# Patient Record
Sex: FEMALE | Race: BLACK OR AFRICAN AMERICAN | NOT HISPANIC OR LATINO | Employment: OTHER | RURAL
[De-identification: names, ages, dates, MRNs, and addresses within clinical notes are randomized per-mention and may not be internally consistent; named-entity substitution may affect disease eponyms.]

---

## 2017-11-01 ENCOUNTER — HISTORICAL (OUTPATIENT)
Dept: ADMINISTRATIVE | Facility: HOSPITAL | Age: 67
End: 2017-11-01

## 2017-11-02 LAB
LAB AP CLINICAL INFORMATION: NORMAL
LAB AP DIAGNOSIS - HISTORICAL: NORMAL
LAB AP GROSS PATHOLOGY - HISTORICAL: NORMAL
LAB AP SPECIMEN SUBMITTED - HISTORICAL: NORMAL

## 2019-05-30 ENCOUNTER — HISTORICAL (OUTPATIENT)
Dept: ADMINISTRATIVE | Facility: HOSPITAL | Age: 69
End: 2019-05-30

## 2019-07-17 ENCOUNTER — HISTORICAL (OUTPATIENT)
Dept: ADMINISTRATIVE | Facility: HOSPITAL | Age: 69
End: 2019-07-17

## 2020-11-17 ENCOUNTER — HISTORICAL (OUTPATIENT)
Dept: ADMINISTRATIVE | Facility: HOSPITAL | Age: 70
End: 2020-11-17

## 2021-04-08 RX ORDER — LEVOTHYROXINE SODIUM 75 UG/1
75 TABLET ORAL DAILY
Qty: 90 TABLET | Refills: 3 | Status: SHIPPED | OUTPATIENT
Start: 2021-04-08 | End: 2022-04-14 | Stop reason: SDUPTHER

## 2021-04-08 RX ORDER — ALLOPURINOL 100 MG/1
TABLET ORAL
COMMUNITY
Start: 2021-03-25 | End: 2021-08-12 | Stop reason: SDUPTHER

## 2021-04-08 RX ORDER — POTASSIUM CHLORIDE 750 MG/1
TABLET, EXTENDED RELEASE ORAL
COMMUNITY
Start: 2021-01-17 | End: 2021-08-12 | Stop reason: SDUPTHER

## 2021-04-08 RX ORDER — LOSARTAN POTASSIUM 100 MG/1
100 TABLET ORAL DAILY
Qty: 90 TABLET | Refills: 3 | Status: SHIPPED | OUTPATIENT
Start: 2021-04-08 | End: 2022-04-14 | Stop reason: SDUPTHER

## 2021-04-08 RX ORDER — HYDROCHLOROTHIAZIDE 25 MG/1
25 TABLET ORAL DAILY
Qty: 90 TABLET | Refills: 3 | Status: SHIPPED | OUTPATIENT
Start: 2021-04-08 | End: 2021-07-30 | Stop reason: SDUPTHER

## 2021-04-08 RX ORDER — MONTELUKAST SODIUM 10 MG/1
TABLET ORAL
COMMUNITY
Start: 2021-01-17 | End: 2021-08-12 | Stop reason: SDUPTHER

## 2021-04-08 RX ORDER — AMLODIPINE BESYLATE 5 MG/1
5 TABLET ORAL DAILY
COMMUNITY
Start: 2021-03-25 | End: 2021-07-30 | Stop reason: SDUPTHER

## 2021-07-30 RX ORDER — ALBUTEROL SULFATE 90 UG/1
2 AEROSOL, METERED RESPIRATORY (INHALATION) EVERY 4 HOURS PRN
Qty: 25.5 G | Refills: 3 | Status: SHIPPED | OUTPATIENT
Start: 2021-07-30 | End: 2022-10-13 | Stop reason: SDUPTHER

## 2021-07-30 RX ORDER — ALBUTEROL SULFATE 90 UG/1
2 AEROSOL, METERED RESPIRATORY (INHALATION) EVERY 4 HOURS PRN
COMMUNITY
End: 2021-07-30 | Stop reason: SDUPTHER

## 2021-07-30 RX ORDER — AMLODIPINE BESYLATE 5 MG/1
5 TABLET ORAL DAILY
Qty: 90 TABLET | Refills: 3 | Status: SHIPPED | OUTPATIENT
Start: 2021-07-30 | End: 2022-07-11 | Stop reason: SDUPTHER

## 2021-07-30 RX ORDER — HYDROCHLOROTHIAZIDE 25 MG/1
25 TABLET ORAL DAILY
Qty: 90 TABLET | Refills: 3 | Status: SHIPPED | OUTPATIENT
Start: 2021-07-30 | End: 2022-07-11 | Stop reason: SDUPTHER

## 2021-08-12 ENCOUNTER — APPOINTMENT (OUTPATIENT)
Dept: RADIOLOGY | Facility: CLINIC | Age: 71
End: 2021-08-12
Attending: FAMILY MEDICINE
Payer: MEDICARE

## 2021-08-12 ENCOUNTER — OFFICE VISIT (OUTPATIENT)
Dept: FAMILY MEDICINE | Facility: CLINIC | Age: 71
End: 2021-08-12
Payer: MEDICARE

## 2021-08-12 VITALS
RESPIRATION RATE: 20 BRPM | DIASTOLIC BLOOD PRESSURE: 90 MMHG | SYSTOLIC BLOOD PRESSURE: 150 MMHG | WEIGHT: 250 LBS | TEMPERATURE: 98 F | HEIGHT: 64 IN | BODY MASS INDEX: 42.68 KG/M2 | HEART RATE: 84 BPM | OXYGEN SATURATION: 98 %

## 2021-08-12 DIAGNOSIS — E87.6 HYPOKALEMIA: ICD-10-CM

## 2021-08-12 DIAGNOSIS — M25.569 ACUTE KNEE PAIN, UNSPECIFIED LATERALITY: Primary | ICD-10-CM

## 2021-08-12 DIAGNOSIS — G89.29 CHRONIC PAIN OF LEFT KNEE: ICD-10-CM

## 2021-08-12 DIAGNOSIS — M25.569 ACUTE KNEE PAIN, UNSPECIFIED LATERALITY: ICD-10-CM

## 2021-08-12 DIAGNOSIS — M25.562 CHRONIC PAIN OF LEFT KNEE: ICD-10-CM

## 2021-08-12 DIAGNOSIS — M10.9 GOUT, UNSPECIFIED CAUSE, UNSPECIFIED CHRONICITY, UNSPECIFIED SITE: ICD-10-CM

## 2021-08-12 DIAGNOSIS — G47.09 OTHER INSOMNIA: ICD-10-CM

## 2021-08-12 DIAGNOSIS — J30.9 ALLERGIC RHINITIS, UNSPECIFIED SEASONALITY, UNSPECIFIED TRIGGER: ICD-10-CM

## 2021-08-12 PROCEDURE — 73562 X-RAY EXAM OF KNEE 3: CPT | Mod: 26,LT,, | Performed by: RADIOLOGY

## 2021-08-12 PROCEDURE — 99214 PR OFFICE/OUTPT VISIT, EST, LEVL IV, 30-39 MIN: ICD-10-PCS | Mod: ,,, | Performed by: FAMILY MEDICINE

## 2021-08-12 PROCEDURE — 99214 OFFICE O/P EST MOD 30 MIN: CPT | Mod: ,,, | Performed by: FAMILY MEDICINE

## 2021-08-12 PROCEDURE — 73562 XR KNEE 3 VIEW LEFT: ICD-10-PCS | Mod: 26,LT,, | Performed by: RADIOLOGY

## 2021-08-12 PROCEDURE — 73562 X-RAY EXAM OF KNEE 3: CPT | Mod: TC,RHCUB,LT | Performed by: FAMILY MEDICINE

## 2021-08-12 RX ORDER — ZOLPIDEM TARTRATE 10 MG/1
10 TABLET ORAL NIGHTLY PRN
Qty: 90 TABLET | Refills: 1 | Status: SHIPPED | OUTPATIENT
Start: 2021-08-12 | End: 2023-10-06 | Stop reason: SDUPTHER

## 2021-08-12 RX ORDER — MONTELUKAST SODIUM 10 MG/1
10 TABLET ORAL DAILY
Qty: 90 TABLET | Refills: 3 | Status: SHIPPED | OUTPATIENT
Start: 2021-08-12 | End: 2022-10-13 | Stop reason: SDUPTHER

## 2021-08-12 RX ORDER — POTASSIUM CHLORIDE 750 MG/1
20 TABLET, EXTENDED RELEASE ORAL ONCE
Qty: 180 TABLET | Refills: 3 | Status: SHIPPED | OUTPATIENT
Start: 2021-08-12 | End: 2021-08-12

## 2021-08-12 RX ORDER — ALLOPURINOL 100 MG/1
200 TABLET ORAL DAILY
Qty: 180 TABLET | Refills: 3 | Status: SHIPPED | OUTPATIENT
Start: 2021-08-12 | End: 2022-10-13 | Stop reason: SDUPTHER

## 2021-10-01 ENCOUNTER — OFFICE VISIT (OUTPATIENT)
Dept: ORTHOPEDICS | Facility: CLINIC | Age: 71
End: 2021-10-01
Payer: MEDICARE

## 2021-10-01 VITALS — BODY MASS INDEX: 42.68 KG/M2 | HEIGHT: 64 IN | WEIGHT: 250 LBS

## 2021-10-01 DIAGNOSIS — G89.29 CHRONIC PAIN OF LEFT KNEE: ICD-10-CM

## 2021-10-01 DIAGNOSIS — M17.12 OSTEOARTHRITIS OF LEFT KNEE, UNSPECIFIED OSTEOARTHRITIS TYPE: Primary | ICD-10-CM

## 2021-10-01 DIAGNOSIS — M25.562 CHRONIC PAIN OF LEFT KNEE: ICD-10-CM

## 2021-10-01 PROCEDURE — 20610 LARGE JOINT ASPIRATION/INJECTION: L SUPRA PATELLAR BURSA: ICD-10-PCS | Mod: LT,,, | Performed by: NURSE PRACTITIONER

## 2021-10-01 PROCEDURE — 99214 OFFICE O/P EST MOD 30 MIN: CPT | Mod: 25,,, | Performed by: NURSE PRACTITIONER

## 2021-10-01 PROCEDURE — 99214 PR OFFICE/OUTPT VISIT, EST, LEVL IV, 30-39 MIN: ICD-10-PCS | Mod: 25,,, | Performed by: NURSE PRACTITIONER

## 2021-10-01 PROCEDURE — 20610 DRAIN/INJ JOINT/BURSA W/O US: CPT | Mod: LT,,, | Performed by: NURSE PRACTITIONER

## 2021-10-01 RX ORDER — NAPROXEN 500 MG/1
500 TABLET ORAL 2 TIMES DAILY WITH MEALS
Qty: 60 TABLET | Refills: 3 | Status: SHIPPED | OUTPATIENT
Start: 2021-10-01 | End: 2022-04-14 | Stop reason: SDUPTHER

## 2021-10-01 RX ORDER — TRIAMCINOLONE ACETONIDE 40 MG/ML
40 INJECTION, SUSPENSION INTRA-ARTICULAR; INTRAMUSCULAR
Status: DISCONTINUED | OUTPATIENT
Start: 2021-10-01 | End: 2021-10-01 | Stop reason: HOSPADM

## 2021-10-01 RX ADMIN — TRIAMCINOLONE ACETONIDE 40 MG: 40 INJECTION, SUSPENSION INTRA-ARTICULAR; INTRAMUSCULAR at 09:10

## 2021-10-14 ENCOUNTER — OFFICE VISIT (OUTPATIENT)
Dept: FAMILY MEDICINE | Facility: CLINIC | Age: 71
End: 2021-10-14
Payer: MEDICARE

## 2021-10-14 VITALS
TEMPERATURE: 97 F | DIASTOLIC BLOOD PRESSURE: 84 MMHG | SYSTOLIC BLOOD PRESSURE: 138 MMHG | HEART RATE: 60 BPM | BODY MASS INDEX: 40.82 KG/M2 | HEIGHT: 65 IN | RESPIRATION RATE: 18 BRPM | WEIGHT: 245 LBS | OXYGEN SATURATION: 99 %

## 2021-10-14 DIAGNOSIS — E87.6 HYPOKALEMIA: ICD-10-CM

## 2021-10-14 DIAGNOSIS — E03.9 HYPOTHYROIDISM, UNSPECIFIED TYPE: ICD-10-CM

## 2021-10-14 DIAGNOSIS — Z23 NEED FOR VACCINATION: ICD-10-CM

## 2021-10-14 DIAGNOSIS — I10 ESSENTIAL HYPERTENSION: Primary | ICD-10-CM

## 2021-10-14 DIAGNOSIS — D64.9 ANEMIA, UNSPECIFIED TYPE: ICD-10-CM

## 2021-10-14 DIAGNOSIS — E55.9 VITAMIN D DEFICIENCY: ICD-10-CM

## 2021-10-14 LAB
25(OH)D3 SERPL-MCNC: 10.9 NG/ML
ALBUMIN SERPL BCP-MCNC: 3.6 G/DL (ref 3.5–5)
ALBUMIN/GLOB SERPL: 0.8 {RATIO}
ALP SERPL-CCNC: 100 U/L (ref 55–142)
ALT SERPL W P-5'-P-CCNC: 14 U/L (ref 13–56)
ANION GAP SERPL CALCULATED.3IONS-SCNC: 8 MMOL/L (ref 7–16)
AST SERPL W P-5'-P-CCNC: 9 U/L (ref 15–37)
BACTERIA #/AREA URNS HPF: NORMAL /HPF
BASOPHILS # BLD AUTO: 0.06 K/UL (ref 0–0.2)
BASOPHILS NFR BLD AUTO: 0.6 % (ref 0–1)
BILIRUB SERPL-MCNC: 0.4 MG/DL (ref 0–1.2)
BILIRUB UR QL STRIP: NEGATIVE
BUN SERPL-MCNC: 22 MG/DL (ref 7–18)
BUN/CREAT SERPL: 21 (ref 6–20)
CALCIUM SERPL-MCNC: 9.5 MG/DL (ref 8.5–10.1)
CHLORIDE SERPL-SCNC: 108 MMOL/L (ref 98–107)
CHOLEST SERPL-MCNC: 140 MG/DL (ref 0–200)
CHOLEST/HDLC SERPL: 2.3 {RATIO}
CLARITY UR: CLEAR
CO2 SERPL-SCNC: 29 MMOL/L (ref 21–32)
COLOR UR: YELLOW
CREAT SERPL-MCNC: 1.04 MG/DL (ref 0.55–1.02)
DIFFERENTIAL METHOD BLD: ABNORMAL
EOSINOPHIL # BLD AUTO: 0.22 K/UL (ref 0–0.5)
EOSINOPHIL NFR BLD AUTO: 2.1 % (ref 1–4)
ERYTHROCYTE [DISTWIDTH] IN BLOOD BY AUTOMATED COUNT: 16.9 % (ref 11.5–14.5)
GLOBULIN SER-MCNC: 4.3 G/DL (ref 2–4)
GLUCOSE SERPL-MCNC: 106 MG/DL (ref 74–106)
GLUCOSE UR STRIP-MCNC: NEGATIVE MG/DL
HCT VFR BLD AUTO: 37.6 % (ref 38–47)
HDLC SERPL-MCNC: 61 MG/DL (ref 40–60)
HGB BLD-MCNC: 11.7 G/DL (ref 12–16)
IMM GRANULOCYTES # BLD AUTO: 0.04 K/UL (ref 0–0.04)
IMM GRANULOCYTES NFR BLD: 0.4 % (ref 0–0.4)
KETONES UR STRIP-SCNC: NEGATIVE MG/DL
LDLC SERPL CALC-MCNC: 71 MG/DL
LDLC/HDLC SERPL: 1.2 {RATIO}
LEUKOCYTE ESTERASE UR QL STRIP: ABNORMAL
LYMPHOCYTES # BLD AUTO: 2.51 K/UL (ref 1–4.8)
LYMPHOCYTES NFR BLD AUTO: 23.5 % (ref 27–41)
MCH RBC QN AUTO: 28.3 PG (ref 27–31)
MCHC RBC AUTO-ENTMCNC: 31.1 G/DL (ref 32–36)
MCV RBC AUTO: 90.8 FL (ref 80–96)
MONOCYTES # BLD AUTO: 0.79 K/UL (ref 0–0.8)
MONOCYTES NFR BLD AUTO: 7.4 % (ref 2–6)
MPC BLD CALC-MCNC: 9.8 FL (ref 9.4–12.4)
NEUTROPHILS # BLD AUTO: 7.08 K/UL (ref 1.8–7.7)
NEUTROPHILS NFR BLD AUTO: 66 % (ref 53–65)
NITRITE UR QL STRIP: NEGATIVE
NONHDLC SERPL-MCNC: 79 MG/DL
NRBC # BLD AUTO: 0 X10E3/UL
NRBC, AUTO (.00): 0 %
PH UR STRIP: 6.5 PH UNITS
PLATELET # BLD AUTO: 521 K/UL (ref 150–400)
POTASSIUM SERPL-SCNC: 4.8 MMOL/L (ref 3.5–5.1)
PROT SERPL-MCNC: 7.9 G/DL (ref 6.4–8.2)
PROT UR QL STRIP: NEGATIVE
RBC # BLD AUTO: 4.14 M/UL (ref 4.2–5.4)
RBC # UR STRIP: NEGATIVE /UL
RBC #/AREA URNS HPF: NORMAL /HPF
SODIUM SERPL-SCNC: 140 MMOL/L (ref 136–145)
SP GR UR STRIP: 1.02
SQUAMOUS #/AREA URNS LPF: NORMAL /LPF
TRIGL SERPL-MCNC: 39 MG/DL (ref 35–150)
TSH SERPL DL<=0.005 MIU/L-ACNC: 2.4 UIU/ML (ref 0.36–3.74)
UROBILINOGEN UR STRIP-ACNC: 1 MG/DL
VLDLC SERPL-MCNC: 8 MG/DL
WBC # BLD AUTO: 10.7 K/UL (ref 4.5–11)
WBC #/AREA URNS HPF: NORMAL /HPF

## 2021-10-14 PROCEDURE — 90662 FLU VACCINE - QUADRIVALENT - HIGH DOSE (65+) PRESERVATIVE FREE IM: ICD-10-PCS | Mod: ,,, | Performed by: FAMILY MEDICINE

## 2021-10-14 PROCEDURE — 84443 ASSAY THYROID STIM HORMONE: CPT | Mod: ,,, | Performed by: CLINICAL MEDICAL LABORATORY

## 2021-10-14 PROCEDURE — 82306 VITAMIN D 25 HYDROXY: CPT | Mod: ,,, | Performed by: CLINICAL MEDICAL LABORATORY

## 2021-10-14 PROCEDURE — 82306 VITAMIN D: ICD-10-PCS | Mod: ,,, | Performed by: CLINICAL MEDICAL LABORATORY

## 2021-10-14 PROCEDURE — 80061 LIPID PANEL: ICD-10-PCS | Mod: ,,, | Performed by: CLINICAL MEDICAL LABORATORY

## 2021-10-14 PROCEDURE — G0008 FLU VACCINE - QUADRIVALENT - HIGH DOSE (65+) PRESERVATIVE FREE IM: ICD-10-PCS | Mod: ,,, | Performed by: FAMILY MEDICINE

## 2021-10-14 PROCEDURE — 80053 COMPREHENSIVE METABOLIC PANEL: ICD-10-PCS | Mod: ,,, | Performed by: CLINICAL MEDICAL LABORATORY

## 2021-10-14 PROCEDURE — 85025 CBC WITH DIFFERENTIAL: ICD-10-PCS | Mod: ,,, | Performed by: CLINICAL MEDICAL LABORATORY

## 2021-10-14 PROCEDURE — 90662 IIV NO PRSV INCREASED AG IM: CPT | Mod: ,,, | Performed by: FAMILY MEDICINE

## 2021-10-14 PROCEDURE — 81001 URINALYSIS, REFLEX TO URINE CULTURE: ICD-10-PCS | Mod: ,,, | Performed by: CLINICAL MEDICAL LABORATORY

## 2021-10-14 PROCEDURE — G0008 ADMIN INFLUENZA VIRUS VAC: HCPCS | Mod: ,,, | Performed by: FAMILY MEDICINE

## 2021-10-14 PROCEDURE — 85025 COMPLETE CBC W/AUTO DIFF WBC: CPT | Mod: ,,, | Performed by: CLINICAL MEDICAL LABORATORY

## 2021-10-14 PROCEDURE — 99214 OFFICE O/P EST MOD 30 MIN: CPT | Mod: ,,, | Performed by: FAMILY MEDICINE

## 2021-10-14 PROCEDURE — 80061 LIPID PANEL: CPT | Mod: ,,, | Performed by: CLINICAL MEDICAL LABORATORY

## 2021-10-14 PROCEDURE — 99214 PR OFFICE/OUTPT VISIT, EST, LEVL IV, 30-39 MIN: ICD-10-PCS | Mod: ,,, | Performed by: FAMILY MEDICINE

## 2021-10-14 PROCEDURE — 81001 URINALYSIS AUTO W/SCOPE: CPT | Mod: ,,, | Performed by: CLINICAL MEDICAL LABORATORY

## 2021-10-14 PROCEDURE — 84443 TSH: ICD-10-PCS | Mod: ,,, | Performed by: CLINICAL MEDICAL LABORATORY

## 2021-10-14 PROCEDURE — 80053 COMPREHEN METABOLIC PANEL: CPT | Mod: ,,, | Performed by: CLINICAL MEDICAL LABORATORY

## 2021-12-16 ENCOUNTER — APPOINTMENT (OUTPATIENT)
Dept: RADIOLOGY | Facility: CLINIC | Age: 71
End: 2021-12-16
Attending: FAMILY MEDICINE
Payer: MEDICARE

## 2021-12-16 DIAGNOSIS — Z11.1 SCREENING-PULMONARY TB: ICD-10-CM

## 2021-12-16 DIAGNOSIS — I10 ESSENTIAL HYPERTENSION: Primary | ICD-10-CM

## 2021-12-16 DIAGNOSIS — I10 ESSENTIAL HYPERTENSION: ICD-10-CM

## 2021-12-16 PROCEDURE — 71046 X-RAY EXAM CHEST 2 VIEWS: CPT | Mod: TC,RHCUB | Performed by: FAMILY MEDICINE

## 2021-12-16 PROCEDURE — 71046 X-RAY EXAM CHEST 2 VIEWS: CPT | Mod: 26,,, | Performed by: RADIOLOGY

## 2021-12-16 PROCEDURE — 71046 XR CHEST PA AND LATERAL: ICD-10-PCS | Mod: 26,,, | Performed by: RADIOLOGY

## 2022-02-17 ENCOUNTER — OFFICE VISIT (OUTPATIENT)
Dept: ORTHOPEDICS | Facility: CLINIC | Age: 72
End: 2022-02-17
Payer: MEDICARE

## 2022-02-17 DIAGNOSIS — M17.12 PRIMARY OSTEOARTHRITIS OF LEFT KNEE: Primary | ICD-10-CM

## 2022-02-17 PROCEDURE — 20610 LARGE JOINT ASPIRATION/INJECTION: L SUPRA PATELLAR BURSA: ICD-10-PCS | Mod: LT,,, | Performed by: ORTHOPAEDIC SURGERY

## 2022-02-17 PROCEDURE — 99212 OFFICE O/P EST SF 10 MIN: CPT | Mod: 25,,, | Performed by: ORTHOPAEDIC SURGERY

## 2022-02-17 PROCEDURE — 99212 PR OFFICE/OUTPT VISIT, EST, LEVL II, 10-19 MIN: ICD-10-PCS | Mod: 25,,, | Performed by: ORTHOPAEDIC SURGERY

## 2022-02-17 PROCEDURE — 20610 DRAIN/INJ JOINT/BURSA W/O US: CPT | Mod: LT,,, | Performed by: ORTHOPAEDIC SURGERY

## 2022-02-17 RX ORDER — BUPIVACAINE HYDROCHLORIDE 5 MG/ML
3 INJECTION, SOLUTION PERINEURAL
Status: DISCONTINUED | OUTPATIENT
Start: 2022-02-17 | End: 2022-02-17 | Stop reason: HOSPADM

## 2022-02-17 RX ORDER — TRIAMCINOLONE ACETONIDE 40 MG/ML
40 INJECTION, SUSPENSION INTRA-ARTICULAR; INTRAMUSCULAR
Status: DISCONTINUED | OUTPATIENT
Start: 2022-02-17 | End: 2022-02-17 | Stop reason: HOSPADM

## 2022-02-17 RX ADMIN — TRIAMCINOLONE ACETONIDE 40 MG: 40 INJECTION, SUSPENSION INTRA-ARTICULAR; INTRAMUSCULAR at 02:02

## 2022-02-17 RX ADMIN — BUPIVACAINE HYDROCHLORIDE 3 ML: 5 INJECTION, SOLUTION PERINEURAL at 02:02

## 2022-02-17 NOTE — PROGRESS NOTES
ASSESSMENT:      ICD-10-CM ICD-9-CM   1. Primary osteoarthritis of left knee  M17.12 715.16       PLAN:     -Findings and treatment options were discussed with the patient  -All questions answered  Natural history and expected course discussed. Questions answered.  Educational materials distributed.  The process of total knee arthroplasty was discussed today.  We are going to do 1 more injection as she is not ready for total knee arthroplasty at this time.  Injection given today please see procedure note.  Return to clinic in 3 months.  There are no Patient Instructions on file for this visit.    IMAGING:  No results found.             CC:  Knee pain    71 y.o. Female returns to clinic for a follow up visit regarding knee pain.      Treatment to date has included:  Injections with moderate relief         REVIEW OF SYSTEMS:   Constitution: Negative. Negative for chills, fever and night sweats.    Hematologic/Lymphatic: Negative for bleeding problem. Does not bruise/bleed easily.   Skin: Negative for dry skin, itching and rash.   Musculoskeletal: Negative for falls. Positive for knee pain and muscle weakness.     All other review of symptoms were reviewed and found to be noncontributory.     PAST MEDICAL HISTORY:   Past Medical History:   Diagnosis Date    Anemia     Dysuria     Encounter for long-term (current) use of other medications     Encounter for screening colonoscopy 05/13/2015    Essential hypertension     Family history of polyps in the colon     Goiter     Gout     H/O bone density study 02/27/2020    History of esophagogastroduodenoscopy (EGD) 01/2020    Hypokalemia     Hypothyroidism     Insomnia     Knee pain     Screening for malignant neoplasm of cervix 01/20/2020    Screening mammogram, encounter for 07/16/2021    [facility]/negative exam/scattered fibroglandular densities/recommended anual screening    Screening mammogram, encounter for 06/11/2020       PAST SURGICAL HISTORY:    Past Surgical History:   Procedure Laterality Date    HYSTERECTOMY      TUBAL LIGATION         FAMILY HISTORY:   No family history on file.    SOCIAL HISTORY:   Social History     Socioeconomic History    Marital status: Single   Occupational History    Occupation: retired   Tobacco Use    Smoking status: Never Smoker    Smokeless tobacco: Never Used   Substance and Sexual Activity    Alcohol use: Never    Drug use: Never    Sexual activity: Not Currently       MEDICATIONS:     Current Outpatient Medications:     albuterol (PROVENTIL/VENTOLIN HFA) 90 mcg/actuation inhaler, Inhale 2 puffs into the lungs every 4 (four) hours as needed for Wheezing. Rescue, Disp: 25.5 g, Rfl: 3    allopurinoL (ZYLOPRIM) 100 MG tablet, Take 2 tablets (200 mg total) by mouth once daily., Disp: 180 tablet, Rfl: 3    amLODIPine (NORVASC) 5 MG tablet, Take 1 tablet (5 mg total) by mouth once daily., Disp: 90 tablet, Rfl: 3    hydroCHLOROthiazide (HYDRODIURIL) 25 MG tablet, Take 1 tablet (25 mg total) by mouth once daily., Disp: 90 tablet, Rfl: 3    levothyroxine (EUTHYROX) 75 MCG tablet, Take 1 tablet (75 mcg total) by mouth once daily., Disp: 90 tablet, Rfl: 3    losartan (COZAAR) 100 MG tablet, Take 1 tablet (100 mg total) by mouth once daily., Disp: 90 tablet, Rfl: 3    montelukast (SINGULAIR) 10 mg tablet, Take 1 tablet (10 mg total) by mouth once daily., Disp: 90 tablet, Rfl: 3    naproxen (NAPROSYN) 500 MG tablet, Take 1 tablet (500 mg total) by mouth 2 (two) times daily with meals., Disp: 60 tablet, Rfl: 3    zolpidem (AMBIEN) 10 mg Tab, Take 1 tablet (10 mg total) by mouth nightly as needed (insomnia)., Disp: 90 tablet, Rfl: 1    ALLERGIES:   Review of patient's allergies indicates:  No Known Allergies     PHYSICAL EXAMINATION:  There were no vitals taken for this visit.  Ortho/SPM Exam        Orders Placed This Encounter   Procedures    Large Joint Aspiration/Injection: L supra patellar bursa         Large  Joint Aspiration/Injection: L supra patellar bursa    Date/Time: 2/17/2022 2:45 PM  Performed by: Erasmo Rogers MD  Authorized by: Erasmo Rogers MD     Consent Done?:  Yes (Verbal)  Indications:  Pain  Local anesthesia used?: No      Details:  Needle Size:  22 G  Approach:  Superior  Location:  Knee  Site:  L supra patellar bursa  Medications:  3 mL BUPivacaine 0.5 % (5 mg/mL); 40 mg triamcinolone acetonide 40 mg/mL  Patient tolerance:  Patient tolerated the procedure well with no immediate complications

## 2022-03-11 DIAGNOSIS — Z71.89 COMPLEX CARE COORDINATION: ICD-10-CM

## 2022-04-14 ENCOUNTER — OFFICE VISIT (OUTPATIENT)
Dept: FAMILY MEDICINE | Facility: CLINIC | Age: 72
End: 2022-04-14
Payer: MEDICARE

## 2022-04-14 VITALS
OXYGEN SATURATION: 98 % | BODY MASS INDEX: 42.82 KG/M2 | TEMPERATURE: 99 F | DIASTOLIC BLOOD PRESSURE: 70 MMHG | WEIGHT: 257 LBS | RESPIRATION RATE: 18 BRPM | HEART RATE: 81 BPM | SYSTOLIC BLOOD PRESSURE: 112 MMHG | HEIGHT: 65 IN

## 2022-04-14 DIAGNOSIS — M10.9 GOUT, UNSPECIFIED CAUSE, UNSPECIFIED CHRONICITY, UNSPECIFIED SITE: ICD-10-CM

## 2022-04-14 DIAGNOSIS — E55.9 VITAMIN D DEFICIENCY: ICD-10-CM

## 2022-04-14 DIAGNOSIS — M17.12 OSTEOARTHRITIS OF LEFT KNEE, UNSPECIFIED OSTEOARTHRITIS TYPE: Chronic | ICD-10-CM

## 2022-04-14 DIAGNOSIS — D64.9 ANEMIA, UNSPECIFIED TYPE: ICD-10-CM

## 2022-04-14 DIAGNOSIS — E03.9 HYPOTHYROIDISM, UNSPECIFIED TYPE: Chronic | ICD-10-CM

## 2022-04-14 DIAGNOSIS — I10 ESSENTIAL HYPERTENSION: Primary | ICD-10-CM

## 2022-04-14 DIAGNOSIS — R73.9 HYPERGLYCEMIA: ICD-10-CM

## 2022-04-14 PROCEDURE — 99214 PR OFFICE/OUTPT VISIT, EST, LEVL IV, 30-39 MIN: ICD-10-PCS | Mod: ,,, | Performed by: FAMILY MEDICINE

## 2022-04-14 PROCEDURE — 99214 OFFICE O/P EST MOD 30 MIN: CPT | Mod: ,,, | Performed by: FAMILY MEDICINE

## 2022-04-14 RX ORDER — POTASSIUM CHLORIDE 750 MG/1
TABLET, EXTENDED RELEASE ORAL
COMMUNITY
Start: 2021-12-14 | End: 2022-07-15 | Stop reason: SDUPTHER

## 2022-04-14 RX ORDER — NAPROXEN 500 MG/1
500 TABLET ORAL 2 TIMES DAILY PRN
Qty: 180 TABLET | Refills: 3 | Status: SHIPPED | OUTPATIENT
Start: 2022-04-14 | End: 2023-04-13 | Stop reason: SDUPTHER

## 2022-04-14 RX ORDER — LEVOTHYROXINE SODIUM 75 UG/1
75 TABLET ORAL DAILY
Qty: 90 TABLET | Refills: 3 | Status: SHIPPED | OUTPATIENT
Start: 2022-04-14 | End: 2022-07-11 | Stop reason: SDUPTHER

## 2022-04-14 RX ORDER — LOSARTAN POTASSIUM 100 MG/1
100 TABLET ORAL DAILY
Qty: 90 TABLET | Refills: 3 | Status: SHIPPED | OUTPATIENT
Start: 2022-04-14 | End: 2022-07-11 | Stop reason: SDUPTHER

## 2022-04-14 NOTE — PROGRESS NOTES
"Subjective:       Patient ID: Janay Reveles is a 71 y.o. female.    Chief Complaint: 6 monthcheck up    70 yo BF here for 6 month check up and lab.     Review of Systems   Constitutional: Negative for activity change.   Eyes: Negative for visual disturbance.   Respiratory: Negative for shortness of breath.    Cardiovascular: Negative for chest pain.   Gastrointestinal: Negative for abdominal pain.   Neurological: Negative for headaches.   Psychiatric/Behavioral: Negative for dysphoric mood.             Objective:     Blood pressure 112/70, pulse 81, temperature 98.5 °F (36.9 °C), temperature source Oral, resp. rate 18, height 5' 5" (1.651 m), weight 116.6 kg (257 lb), SpO2 98 %.      Physical Exam  Constitutional:       Appearance: Normal appearance.   HENT:      Head: Normocephalic and atraumatic.      Right Ear: External ear normal.      Left Ear: External ear normal.      Nose: Nose normal.      Mouth/Throat:      Mouth: Mucous membranes are moist.   Eyes:      Conjunctiva/sclera: Conjunctivae normal.      Pupils: Pupils are equal, round, and reactive to light.   Cardiovascular:      Rate and Rhythm: Normal rate and regular rhythm.      Pulses: Normal pulses.      Heart sounds: Normal heart sounds.   Pulmonary:      Effort: Pulmonary effort is normal.      Breath sounds: Normal breath sounds.   Abdominal:      General: Abdomen is flat.      Palpations: Abdomen is soft.   Musculoskeletal:         General: Normal range of motion.      Cervical back: Normal range of motion and neck supple.   Skin:     General: Skin is warm and dry.   Neurological:      General: No focal deficit present.      Mental Status: She is alert and oriented to person, place, and time.   Psychiatric:         Mood and Affect: Mood normal.         Behavior: Behavior normal.         Thought Content: Thought content normal.         Judgment: Judgment normal.         Assessment:       Problem List Items Addressed This Visit        " Cardiac/Vascular    Essential hypertension - Primary    Relevant Medications    losartan (COZAAR) 100 MG tablet    Other Relevant Orders    Urinalysis, Reflex to Urine Culture    Lipid Panel    CBC Auto Differential    Comprehensive Metabolic Panel    TSH       Oncology    Anemia       Endocrine    Hypothyroidism (Chronic)    Relevant Medications    levothyroxine (EUTHYROX) 75 MCG tablet    Other Relevant Orders    Urinalysis, Reflex to Urine Culture    Lipid Panel    CBC Auto Differential    Comprehensive Metabolic Panel    TSH    Vitamin D deficiency    Relevant Orders    Vitamin D       Orthopedic    Osteoarthritis of left knee (Chronic)    Relevant Medications    naproxen (NAPROSYN) 500 MG tablet      Other Visit Diagnoses     Gout, unspecified cause, unspecified chronicity, unspecified site        Relevant Orders    Uric Acid    Hyperglycemia        Relevant Orders    Hemoglobin A1C          Plan:     RTC 6 months for check up with all lab fasting and urine.

## 2022-04-19 PROBLEM — M17.12 OSTEOARTHRITIS OF LEFT KNEE: Chronic | Status: ACTIVE | Noted: 2022-04-19

## 2022-05-17 ENCOUNTER — OFFICE VISIT (OUTPATIENT)
Dept: ORTHOPEDICS | Facility: CLINIC | Age: 72
End: 2022-05-17
Payer: MEDICARE

## 2022-05-17 DIAGNOSIS — M17.12 PRIMARY OSTEOARTHRITIS OF LEFT KNEE: Primary | ICD-10-CM

## 2022-05-17 PROCEDURE — 99215 PR OFFICE/OUTPT VISIT, EST, LEVL V, 40-54 MIN: ICD-10-PCS | Mod: ,,, | Performed by: ORTHOPAEDIC SURGERY

## 2022-05-17 PROCEDURE — 99215 OFFICE O/P EST HI 40 MIN: CPT | Mod: ,,, | Performed by: ORTHOPAEDIC SURGERY

## 2022-05-17 NOTE — PROGRESS NOTES
CC:  Knee pain    71 y.o. Female returns to clinic for a follow up visit regarding knee pain.      She had an injection at her last visit in February and got about 2 months relief from the injection. She is ready to discuss total knee replacement.       Past Medical History:   Diagnosis Date    Anemia     Dysuria     Encounter for long-term (current) use of other medications     Encounter for screening colonoscopy 05/13/2015    Essential hypertension     Family history of polyps in the colon     Goiter     Gout     H/O bone density study 02/27/2020    History of esophagogastroduodenoscopy (EGD) 01/2020    Hypokalemia     Hypothyroidism     Insomnia     Knee pain     Screening for malignant neoplasm of cervix 01/20/2020    Screening mammogram, encounter for 07/16/2021    [facility]/negative exam/scattered fibroglandular densities/recommended anual screening    Screening mammogram, encounter for 06/11/2020     Past Surgical History:   Procedure Laterality Date    HYSTERECTOMY      TUBAL LIGATION           REVIEW OF SYSTEMS:   Constitution: Negative. Negative for chills, fever and night sweats.    Hematologic/Lymphatic: Negative for bleeding problem. Does not bruise/bleed easily.   Skin: Negative for dry skin, itching and rash.   Musculoskeletal: Negative for falls. Positive for knee pain and muscle weakness.   All other review of symptoms were reviewed and found to be noncontributory.     PHYSICAL EXAMINATION:  There were no vitals taken for this visit.  General    Nursing note and vitals reviewed.  Constitutional: She is oriented to person, place, and time. She appears well-developed and well-nourished.   HENT:   Head: Normocephalic and atraumatic.   Nose: Nose normal.   Eyes: Pupils are equal, round, and reactive to light.   Neck: Neck supple.   Cardiovascular: Normal rate, regular rhythm and intact distal pulses.    Pulmonary/Chest: Effort normal. No respiratory distress. She exhibits no  tenderness.   Abdominal: Soft. She exhibits no distension. There is no abdominal tenderness.   Neurological: She is alert and oriented to person, place, and time. She has normal reflexes. She displays normal reflexes. No cranial nerve deficit. She exhibits normal muscle tone.   Psychiatric: She has a normal mood and affect. Her behavior is normal. Judgment and thought content normal.     General Musculoskeletal Exam   Gait: antalgic       Right Knee Exam   Right knee exam is normal.    Inspection   Swelling: present  Effusion: present  Deformity: present    Tenderness   The patient is tender to palpation of the medial joint line.    Crepitus   The patient has crepitus of the patella and medial joint line.    Range of Motion   Extension: abnormal   Flexion: abnormal     Tests   Meniscus   Ronald:  Medial - positive   Ligament Examination Lachman: normal (-1 to 2mm) PCL-Posterior Drawer: normal (0 to 2mm)     MCL - Valgus: normal (0 to 2mm)  LCL - Varus: normalPivot Shift: normal (Equal)Reverse Pivot Shift: normal (Equal)Dial Test at 30 degrees: normal (< 5 degrees)Dial Test at 90 degrees: normal (< 5 degrees)  Posterior Sag Test: negative  Posterolateral Corner: unstable (>15 degrees difference)  Patella   Patellar Tracking: normal  Q-Angle at 90 degrees: normal  Patellar Grind: positive    Other   Sensation: normal    Left Knee Exam     Inspection   Deformity: absent    Tenderness   The patient tender to palpation of the medial joint line and lateral joint line.    Crepitus   The patient has crepitus of the patella.    Range of Motion   Extension: normal   Flexion: abnormal     Tests   Meniscus   Ronald:  Medial - positive Lateral - positive  Stability Lachman: normal (-1 to 2mm) PCL-Posterior Drawer: normal (0 to 2mm)  MCL - Valgus: normal (0 to 2mm)  LCL - Varus: normal (0 to 2mm)  Posterior Sag Test: negative  Patella   Passive Patellar Tilt: lateral tilt  Patellar Tracking: normal  Patellar Grind:  positive  J-Sign: J sign absent    Other   Muscle Tightness: hamstring tightness  Sensation: normal    Muscle Strength   Right Lower Extremity   Quadriceps:  5/5   Hamstrin/5   Left Lower Extremity   Hip Abduction: 5/5   Quadriceps:  5/5   Hamstrin/5     Reflexes     Left Side  Quadriceps:  2+    Right Side   Quadriceps:  2+    Vascular Exam     Right Pulses  Dorsalis Pedis:      2+  Posterior Tibial:      2+          IMAGING:  No results found.     ASSESSMENT:      ICD-10-CM ICD-9-CM   1. Primary osteoarthritis of left knee  M17.12 715.16       PLAN:     -Findings and treatment options were discussed with the patient  -All questions answered  Natural history and expected course discussed. Questions answered.  Educational materials distributed.  Rest, ice, compression, and elevation (RICE) therapy.  The conservative options including NSAIDs, activity modification, physical therapy, corticosteroid injection, and viscosupplimentation were discussed. She is interested in surgical intervention at this time.    The surgical process of knee replacement was discussed in detail with the patient including a detailed discussion of the procedure itself (including visual model, x-ray review, and literature review). The typical perioperative and post-operative course was discussed and perioperative risks were discussed to the patient's satisfaction.  Risks and complications discussed included but were not limited to the risks of anesthetic complications, infection, bleeding, wound healing complications, aseptic loosening, instability, limb length inequality, neurologic dysfunction including numbness,  DVT, pulmonary embolism, perioperative medical risks (cardiac, pulmonary, renal, neurologic), and death and the patient elects to proceed. We will initiate pre-operative medical evaluation and clearance and set a provisional date for surgical intervention according to the patient's schedule.   I have discussed  anticoagulation with aspirin and coumadin and in low risk patients I have recommended aspirin twice a day.  25 minutes was spent in direct consultation with the patient counselling her on the items listed above.      There are no Patient Instructions on file for this visit.      No orders of the defined types were placed in this encounter.        Procedures

## 2022-05-19 DIAGNOSIS — M17.12 OSTEOARTHRITIS OF LEFT KNEE: ICD-10-CM

## 2022-05-19 DIAGNOSIS — M17.12 PRIMARY OSTEOARTHRITIS OF LEFT KNEE: Primary | ICD-10-CM

## 2022-05-19 DIAGNOSIS — Z01.818 PREPROCEDURAL EXAMINATION: Primary | ICD-10-CM

## 2022-05-19 RX ORDER — SODIUM CHLORIDE 9 MG/ML
INJECTION, SOLUTION INTRAVENOUS CONTINUOUS
Status: CANCELLED | OUTPATIENT
Start: 2022-05-19

## 2022-05-19 RX ORDER — MUPIROCIN 20 MG/G
OINTMENT TOPICAL
Status: CANCELLED | OUTPATIENT
Start: 2022-05-19

## 2022-05-31 ENCOUNTER — EXTERNAL CHRONIC CARE MANAGEMENT (OUTPATIENT)
Dept: FAMILY MEDICINE | Facility: CLINIC | Age: 72
End: 2022-05-31
Payer: MEDICARE

## 2022-05-31 PROCEDURE — G0511 PR CHRONIC CARE MGMT, RHC OR FQHC ONLY, 20 MINS OR MORE: ICD-10-PCS | Mod: ,,, | Performed by: FAMILY MEDICINE

## 2022-05-31 PROCEDURE — G0511 CCM/BHI BY RHC/FQHC 20MIN MO: HCPCS | Mod: ,,, | Performed by: FAMILY MEDICINE

## 2022-06-30 ENCOUNTER — EXTERNAL CHRONIC CARE MANAGEMENT (OUTPATIENT)
Dept: FAMILY MEDICINE | Facility: CLINIC | Age: 72
End: 2022-06-30
Payer: MEDICARE

## 2022-06-30 PROCEDURE — G0511 CCM/BHI BY RHC/FQHC 20MIN MO: HCPCS | Mod: ,,, | Performed by: FAMILY MEDICINE

## 2022-06-30 PROCEDURE — G0511 PR CHRONIC CARE MGMT, RHC OR FQHC ONLY, 20 MINS OR MORE: ICD-10-PCS | Mod: ,,, | Performed by: FAMILY MEDICINE

## 2022-07-08 DIAGNOSIS — M17.12 PRIMARY OSTEOARTHRITIS OF LEFT KNEE: Primary | ICD-10-CM

## 2022-07-11 ENCOUNTER — APPOINTMENT (OUTPATIENT)
Dept: RADIOLOGY | Facility: CLINIC | Age: 72
End: 2022-07-11
Attending: FAMILY MEDICINE
Payer: MEDICARE

## 2022-07-11 ENCOUNTER — OFFICE VISIT (OUTPATIENT)
Dept: FAMILY MEDICINE | Facility: CLINIC | Age: 72
End: 2022-07-11
Payer: MEDICARE

## 2022-07-11 VITALS
BODY MASS INDEX: 42.99 KG/M2 | HEART RATE: 67 BPM | SYSTOLIC BLOOD PRESSURE: 132 MMHG | OXYGEN SATURATION: 97 % | RESPIRATION RATE: 18 BRPM | DIASTOLIC BLOOD PRESSURE: 84 MMHG | HEIGHT: 65 IN | WEIGHT: 258 LBS | TEMPERATURE: 99 F

## 2022-07-11 DIAGNOSIS — Z01.818 PRE-OP TESTING: ICD-10-CM

## 2022-07-11 DIAGNOSIS — D64.9 ANEMIA, UNSPECIFIED TYPE: ICD-10-CM

## 2022-07-11 DIAGNOSIS — Z01.818 PREPROCEDURAL EXAMINATION: ICD-10-CM

## 2022-07-11 DIAGNOSIS — I10 ESSENTIAL HYPERTENSION: ICD-10-CM

## 2022-07-11 DIAGNOSIS — E03.9 HYPOTHYROIDISM, UNSPECIFIED TYPE: Chronic | ICD-10-CM

## 2022-07-11 DIAGNOSIS — E55.9 VITAMIN D DEFICIENCY: ICD-10-CM

## 2022-07-11 DIAGNOSIS — E87.6 HYPOKALEMIA: ICD-10-CM

## 2022-07-11 DIAGNOSIS — I10 ESSENTIAL HYPERTENSION: Primary | Chronic | ICD-10-CM

## 2022-07-11 LAB
ALBUMIN SERPL BCP-MCNC: 3.4 G/DL (ref 3.5–5)
ALBUMIN/GLOB SERPL: 0.8 {RATIO}
ALP SERPL-CCNC: 81 U/L (ref 55–142)
ALT SERPL W P-5'-P-CCNC: 14 U/L (ref 13–56)
ANION GAP SERPL CALCULATED.3IONS-SCNC: 9 MMOL/L (ref 7–16)
APTT PPP: 30.3 SECONDS (ref 25.2–37.3)
AST SERPL W P-5'-P-CCNC: 12 U/L (ref 15–37)
BASOPHILS # BLD AUTO: 0.06 K/UL (ref 0–0.2)
BASOPHILS NFR BLD AUTO: 0.6 % (ref 0–1)
BILIRUB SERPL-MCNC: 0.3 MG/DL (ref 0–1.2)
BUN SERPL-MCNC: 14 MG/DL (ref 7–18)
BUN/CREAT SERPL: 16 (ref 6–20)
CALCIUM SERPL-MCNC: 9.1 MG/DL (ref 8.5–10.1)
CHLORIDE SERPL-SCNC: 106 MMOL/L (ref 98–107)
CO2 SERPL-SCNC: 29 MMOL/L (ref 21–32)
CREAT SERPL-MCNC: 0.87 MG/DL (ref 0.55–1.02)
DIFFERENTIAL METHOD BLD: ABNORMAL
EOSINOPHIL # BLD AUTO: 0.27 K/UL (ref 0–0.5)
EOSINOPHIL NFR BLD AUTO: 2.6 % (ref 1–4)
ERYTHROCYTE [DISTWIDTH] IN BLOOD BY AUTOMATED COUNT: 16.4 % (ref 11.5–14.5)
GLOBULIN SER-MCNC: 4.2 G/DL (ref 2–4)
GLUCOSE SERPL-MCNC: 116 MG/DL (ref 74–106)
HCT VFR BLD AUTO: 36 % (ref 38–47)
HGB BLD-MCNC: 11.5 G/DL (ref 12–16)
IMM GRANULOCYTES # BLD AUTO: 0.03 K/UL (ref 0–0.04)
IMM GRANULOCYTES NFR BLD: 0.3 % (ref 0–0.4)
INDIRECT COOMBS: NORMAL
INR BLD: 0.94 (ref 0.9–1.1)
LYMPHOCYTES # BLD AUTO: 2.63 K/UL (ref 1–4.8)
LYMPHOCYTES NFR BLD AUTO: 25.1 % (ref 27–41)
MCH RBC QN AUTO: 27.6 PG (ref 27–31)
MCHC RBC AUTO-ENTMCNC: 31.9 G/DL (ref 32–36)
MCV RBC AUTO: 86.5 FL (ref 80–96)
MONOCYTES # BLD AUTO: 0.83 K/UL (ref 0–0.8)
MONOCYTES NFR BLD AUTO: 7.9 % (ref 2–6)
MPC BLD CALC-MCNC: 9.2 FL (ref 9.4–12.4)
NEUTROPHILS # BLD AUTO: 6.65 K/UL (ref 1.8–7.7)
NEUTROPHILS NFR BLD AUTO: 63.5 % (ref 53–65)
NRBC # BLD AUTO: 0 X10E3/UL
NRBC, AUTO (.00): 0 %
PLATELET # BLD AUTO: 572 K/UL (ref 150–400)
POTASSIUM SERPL-SCNC: 3.4 MMOL/L (ref 3.5–5.1)
PROT SERPL-MCNC: 7.6 G/DL (ref 6.4–8.2)
PROTHROMBIN TIME: 12.2 SECONDS (ref 11.7–14.7)
RBC # BLD AUTO: 4.16 M/UL (ref 4.2–5.4)
RH BLD: NORMAL
SODIUM SERPL-SCNC: 141 MMOL/L (ref 136–145)
WBC # BLD AUTO: 10.47 K/UL (ref 4.5–11)

## 2022-07-11 PROCEDURE — 93010 PR ELECTROCARDIOGRAM REPORT: ICD-10-PCS | Mod: ,,, | Performed by: FAMILY MEDICINE

## 2022-07-11 PROCEDURE — 71046 XR CHEST PA AND LATERAL: ICD-10-PCS | Mod: 26,,, | Performed by: RADIOLOGY

## 2022-07-11 PROCEDURE — 86850 TYPE & SCREEN: ICD-10-PCS | Mod: ,,, | Performed by: CLINICAL MEDICAL LABORATORY

## 2022-07-11 PROCEDURE — 86900 TYPE & SCREEN: ICD-10-PCS | Mod: ,,, | Performed by: CLINICAL MEDICAL LABORATORY

## 2022-07-11 PROCEDURE — 93005 ELECTROCARDIOGRAM TRACING: CPT | Mod: RHCUB | Performed by: FAMILY MEDICINE

## 2022-07-11 PROCEDURE — 86901 TYPE & SCREEN: ICD-10-PCS | Mod: ,,, | Performed by: CLINICAL MEDICAL LABORATORY

## 2022-07-11 PROCEDURE — 86900 BLOOD TYPING SEROLOGIC ABO: CPT | Mod: ,,, | Performed by: CLINICAL MEDICAL LABORATORY

## 2022-07-11 PROCEDURE — 93010 ELECTROCARDIOGRAM REPORT: CPT | Mod: ,,, | Performed by: FAMILY MEDICINE

## 2022-07-11 PROCEDURE — 99214 PR OFFICE/OUTPT VISIT, EST, LEVL IV, 30-39 MIN: ICD-10-PCS | Mod: ,,, | Performed by: FAMILY MEDICINE

## 2022-07-11 PROCEDURE — 71046 X-RAY EXAM CHEST 2 VIEWS: CPT | Mod: 26,,, | Performed by: RADIOLOGY

## 2022-07-11 PROCEDURE — 86901 BLOOD TYPING SEROLOGIC RH(D): CPT | Mod: ,,, | Performed by: CLINICAL MEDICAL LABORATORY

## 2022-07-11 PROCEDURE — 80053 COMPREHEN METABOLIC PANEL: CPT | Mod: ,,, | Performed by: CLINICAL MEDICAL LABORATORY

## 2022-07-11 PROCEDURE — 99214 OFFICE O/P EST MOD 30 MIN: CPT | Mod: ,,, | Performed by: FAMILY MEDICINE

## 2022-07-11 PROCEDURE — 80053 COMPREHENSIVE METABOLIC PANEL: ICD-10-PCS | Mod: ,,, | Performed by: CLINICAL MEDICAL LABORATORY

## 2022-07-11 PROCEDURE — 86850 RBC ANTIBODY SCREEN: CPT | Mod: ,,, | Performed by: CLINICAL MEDICAL LABORATORY

## 2022-07-11 PROCEDURE — 85025 CBC WITH DIFFERENTIAL: ICD-10-PCS | Mod: ,,, | Performed by: CLINICAL MEDICAL LABORATORY

## 2022-07-11 PROCEDURE — 85025 COMPLETE CBC W/AUTO DIFF WBC: CPT | Mod: ,,, | Performed by: CLINICAL MEDICAL LABORATORY

## 2022-07-11 PROCEDURE — 71046 X-RAY EXAM CHEST 2 VIEWS: CPT | Mod: TC,RHCUB | Performed by: FAMILY MEDICINE

## 2022-07-11 RX ORDER — HYDROCHLOROTHIAZIDE 25 MG/1
25 TABLET ORAL DAILY
Qty: 90 TABLET | Refills: 3 | Status: SHIPPED | OUTPATIENT
Start: 2022-07-11 | End: 2023-10-06

## 2022-07-11 RX ORDER — AMLODIPINE BESYLATE 5 MG/1
5 TABLET ORAL DAILY
Qty: 90 TABLET | Refills: 3 | Status: SHIPPED | OUTPATIENT
Start: 2022-07-11 | End: 2023-09-12

## 2022-07-11 RX ORDER — LEVOTHYROXINE SODIUM 75 UG/1
75 TABLET ORAL DAILY
Qty: 90 TABLET | Refills: 3 | Status: SHIPPED | OUTPATIENT
Start: 2022-07-11 | End: 2023-08-07

## 2022-07-11 RX ORDER — LOSARTAN POTASSIUM 100 MG/1
100 TABLET ORAL DAILY
Qty: 90 TABLET | Refills: 3 | Status: SHIPPED | OUTPATIENT
Start: 2022-07-11 | End: 2023-09-12

## 2022-07-11 NOTE — PROGRESS NOTES
Subjective:       Patient ID: Janay Reveles is a 71 y.o. female.    Chief Complaint: No chief complaint on file.    72 yo BF here for pre-op eval for left total knee replacement by Dr.Lane Rogers on 7/18/2022. She had her right knee done in 2010. Is doing well otherwise.     Review of Systems   Constitutional: Negative for activity change.   Eyes: Negative for visual disturbance.   Respiratory: Negative for shortness of breath.    Cardiovascular: Negative for chest pain.   Gastrointestinal: Negative for abdominal pain.   Neurological: Negative for headaches.   Psychiatric/Behavioral: Negative for dysphoric mood.         Office Visit on 07/11/2022   Component Date Value Ref Range Status    Group & Rh 07/11/2022 O POS   Final    Indirect Miguel 07/11/2022 NEG   Final    Sodium 07/11/2022 141  136 - 145 mmol/L Final    Potassium 07/11/2022 3.4 (A) 3.5 - 5.1 mmol/L Final    Chloride 07/11/2022 106  98 - 107 mmol/L Final    CO2 07/11/2022 29  21 - 32 mmol/L Final    Anion Gap 07/11/2022 9  7 - 16 mmol/L Final    Glucose 07/11/2022 116 (A) 74 - 106 mg/dL Final    BUN 07/11/2022 14  7 - 18 mg/dL Final    Creatinine 07/11/2022 0.87  0.55 - 1.02 mg/dL Final    BUN/Creatinine Ratio 07/11/2022 16  6 - 20 Final    Calcium 07/11/2022 9.1  8.5 - 10.1 mg/dL Final    Total Protein 07/11/2022 7.6  6.4 - 8.2 g/dL Final    Albumin 07/11/2022 3.4 (A) 3.5 - 5.0 g/dL Final    Globulin 07/11/2022 4.2 (A) 2.0 - 4.0 g/dL Final    A/G Ratio 07/11/2022 0.8   Final    Bilirubin, Total 07/11/2022 0.3  0.0 - 1.2 mg/dL Final    Alk Phos 07/11/2022 81  55 - 142 U/L Final    ALT 07/11/2022 14  13 - 56 U/L Final    AST 07/11/2022 12 (A) 15 - 37 U/L Final    eGFR 07/11/2022 68  >=60 mL/min/1.73m² Final    PTT 07/11/2022 30.3  25.2 - 37.3 seconds Final    PT 07/11/2022 12.2  11.7 - 14.7 seconds Final    INR 07/11/2022 0.94  0.90 - 1.10 Final    WBC 07/11/2022 10.47  4.50 - 11.00 K/uL Final    RBC 07/11/2022 4.16 (A) 4.20 -  "5.40 M/uL Final    Hemoglobin 07/11/2022 11.5 (A) 12.0 - 16.0 g/dL Final    Hematocrit 07/11/2022 36.0 (A) 38.0 - 47.0 % Final    MCV 07/11/2022 86.5  80.0 - 96.0 fL Final    MCH 07/11/2022 27.6  27.0 - 31.0 pg Final    MCHC 07/11/2022 31.9 (A) 32.0 - 36.0 g/dL Final    RDW 07/11/2022 16.4 (A) 11.5 - 14.5 % Final    Platelet Count 07/11/2022 572 (A) 150 - 400 K/uL Final    MPV 07/11/2022 9.2 (A) 9.4 - 12.4 fL Final    Neutrophils % 07/11/2022 63.5  53.0 - 65.0 % Final    Lymphocytes % 07/11/2022 25.1 (A) 27.0 - 41.0 % Final    Monocytes % 07/11/2022 7.9 (A) 2.0 - 6.0 % Final    Eosinophils % 07/11/2022 2.6  1.0 - 4.0 % Final    Basophils % 07/11/2022 0.6  0.0 - 1.0 % Final    Immature Granulocytes % 07/11/2022 0.3  0.0 - 0.4 % Final    nRBC, Auto 07/11/2022 0.0  <=0.0 % Final    Neutrophils, Abs 07/11/2022 6.65  1.80 - 7.70 K/uL Final    Lymphocytes, Absolute 07/11/2022 2.63  1.00 - 4.80 K/uL Final    Monocytes, Absolute 07/11/2022 0.83 (A) 0.00 - 0.80 K/uL Final    Eosinophils, Absolute 07/11/2022 0.27  0.00 - 0.50 K/uL Final    Basophils, Absolute 07/11/2022 0.06  0.00 - 0.20 K/uL Final    Immature Granulocytes, Absolute 07/11/2022 0.03  0.00 - 0.04 K/uL Final    nRBC, Absolute 07/11/2022 0.00  <=0.00 x10e3/uL Final    Diff Type 07/11/2022 Auto   Final        Objective:     Blood pressure 132/84, pulse 67, temperature 97.8 °F (36.6 °C), resp. rate 18, height 5' 5" (1.651 m), weight 117 kg (258 lb), SpO2 97 %.      Physical Exam  Constitutional:       Appearance: Normal appearance.   HENT:      Head: Normocephalic and atraumatic.      Right Ear: External ear normal.      Left Ear: External ear normal.      Nose: Nose normal.      Mouth/Throat:      Mouth: Mucous membranes are moist.   Eyes:      Conjunctiva/sclera: Conjunctivae normal.      Pupils: Pupils are equal, round, and reactive to light.   Cardiovascular:      Rate and Rhythm: Normal rate and regular rhythm.      Pulses: Normal " pulses.      Heart sounds: Normal heart sounds.   Pulmonary:      Effort: Pulmonary effort is normal.      Breath sounds: Normal breath sounds.   Abdominal:      General: Abdomen is flat.      Palpations: Abdomen is soft.   Musculoskeletal:         General: Normal range of motion.      Cervical back: Normal range of motion and neck supple.   Skin:     General: Skin is warm and dry.   Neurological:      General: No focal deficit present.      Mental Status: She is alert and oriented to person, place, and time.   Psychiatric:         Mood and Affect: Mood normal.         Behavior: Behavior normal.         Thought Content: Thought content normal.         Judgment: Judgment normal.       CXR is ok and EKG is WNL.   Assessment:       Problem List Items Addressed This Visit        Cardiac/Vascular    Essential hypertension - Primary    Relevant Medications    amLODIPine (NORVASC) 5 MG tablet    hydroCHLOROthiazide (HYDRODIURIL) 25 MG tablet    losartan (COZAAR) 100 MG tablet    Other Relevant Orders    X-Ray Chest PA And Lateral (Completed)    POCT EKG 12-LEAD (NOT FOR [facility] USE)       Oncology    Anemia       Endocrine    Hypothyroidism (Chronic)    Relevant Medications    levothyroxine (EUTHYROX) 75 MCG tablet    Vitamin D deficiency      Other Visit Diagnoses     Pre-op testing        Relevant Orders    X-Ray Chest PA And Lateral (Completed)    POCT EKG 12-LEAD (NOT FOR [facility] USE)    Preprocedural examination        Hypokalemia        Relevant Medications    potassium chloride (KLOR-CON) 10 MEQ TbSR          Plan:       She will increase her potassium intake to 2 tablets twice a day before surgery. Also will start MVI with iron daily. She is cleared for surgery from my standpoint.

## 2022-07-12 ENCOUNTER — OFFICE VISIT (OUTPATIENT)
Dept: ORTHOPEDICS | Facility: CLINIC | Age: 72
End: 2022-07-12
Payer: MEDICARE

## 2022-07-12 ENCOUNTER — HOSPITAL ENCOUNTER (OUTPATIENT)
Dept: RADIOLOGY | Facility: HOSPITAL | Age: 72
Discharge: HOME | End: 2022-07-12
Attending: ORTHOPAEDIC SURGERY
Payer: MEDICARE

## 2022-07-12 DIAGNOSIS — Z01.818 PREPROCEDURAL EXAMINATION: ICD-10-CM

## 2022-07-12 DIAGNOSIS — M17.12 PRIMARY OSTEOARTHRITIS OF LEFT KNEE: Primary | ICD-10-CM

## 2022-07-12 DIAGNOSIS — M17.12 PRIMARY OSTEOARTHRITIS OF LEFT KNEE: ICD-10-CM

## 2022-07-12 PROCEDURE — 73562 X-RAY EXAM OF KNEE 3: CPT | Mod: TC,LT

## 2022-07-12 PROCEDURE — 99214 OFFICE O/P EST MOD 30 MIN: CPT | Mod: ,,, | Performed by: ORTHOPAEDIC SURGERY

## 2022-07-12 PROCEDURE — 99214 PR OFFICE/OUTPT VISIT, EST, LEVL IV, 30-39 MIN: ICD-10-PCS | Mod: ,,, | Performed by: ORTHOPAEDIC SURGERY

## 2022-07-12 PROCEDURE — 73562 X-RAY EXAM OF KNEE 3: CPT | Mod: 26,LT,, | Performed by: ORTHOPAEDIC SURGERY

## 2022-07-12 PROCEDURE — 73562 XR KNEE 3 VIEW LEFT: ICD-10-PCS | Mod: 26,LT,, | Performed by: ORTHOPAEDIC SURGERY

## 2022-07-12 NOTE — H&P (VIEW-ONLY)
CC:  Knee pain    71 y.o. Female returns to clinic for a follow up visit regarding left knee pain.  She is scheduled for left total knee arthroplasty in the coming days.  She is here today to go over her laboratory studies and discuss any in all questions related surgery.            Past Medical History:   Diagnosis Date    Anemia     Dysuria     Encounter for long-term (current) use of other medications     Encounter for screening colonoscopy 05/13/2015    Essential hypertension     Family history of polyps in the colon     Goiter     Gout     H/O bone density study 02/27/2020    History of esophagogastroduodenoscopy (EGD) 01/2020    Hypokalemia     Hypothyroidism     Insomnia     Knee pain     Screening for malignant neoplasm of cervix 01/20/2020    Screening mammogram, encounter for 07/16/2021    Andersons/negative exam/scattered fibroglandular densities/recommended anual screening    Screening mammogram, encounter for 06/11/2020     Past Surgical History:   Procedure Laterality Date    HYSTERECTOMY      TUBAL LIGATION           REVIEW OF SYSTEMS:   Constitution: Negative. Negative for chills, fever and night sweats.    Hematologic/Lymphatic: Negative for bleeding problem. Does not bruise/bleed easily.   Skin: Negative for dry skin, itching and rash.   Musculoskeletal: Negative for falls. Positive for knee pain and muscle weakness.   All other review of symptoms were reviewed and found to be noncontributory.     PHYSICAL EXAMINATION:  LMP  (LMP Unknown) Comment: partial hysterectomy  General    Constitutional: She is oriented to person, place, and time. She appears well-developed and well-nourished.   HENT:   Head: Normocephalic and atraumatic.   Eyes: Pupils are equal, round, and reactive to light.   Neck: Neck supple.   Cardiovascular: Normal rate, regular rhythm and intact distal pulses.    Pulmonary/Chest: Effort normal. No respiratory distress. She exhibits no tenderness.    Abdominal: Soft. There is no abdominal tenderness.   Neurological: She is alert and oriented to person, place, and time. She has normal reflexes. She displays normal reflexes. No cranial nerve deficit. She exhibits normal muscle tone.   Psychiatric: She has a normal mood and affect. Her behavior is normal. Judgment and thought content normal.           Right Knee Exam   Right knee exam is normal.    Left Knee Exam     Inspection   Deformity: absent    Tenderness   The patient tender to palpation of the medial joint line and lateral joint line.    Crepitus   The patient has crepitus of the patella.    Range of Motion   Extension: normal   Flexion: abnormal     Tests   Meniscus   Ronald:  Medial - positive Lateral - positive  Stability Lachman: normal (-1 to 2mm) PCL-Posterior Drawer: normal (0 to 2mm)  MCL - Valgus: normal (0 to 2mm)  LCL - Varus: normal (0 to 2mm)  Posterior Sag Test: negative  Patella   Passive Patellar Tilt: lateral tilt  Patellar Tracking: normal  Patellar Grind: positive  J-Sign: J sign absent    Other   Muscle Tightness: hamstring tightness  Sensation: normal    Muscle Strength   Left Lower Extremity   Hip Abduction: 5/5   Quadriceps:  5/5   Hamstrin/5     Reflexes     Left Side  Quadriceps:  2+        IMAGING:  X-Ray Chest PA And Lateral    Result Date: 2022  EXAMINATION: XR CHEST PA AND LATERAL CLINICAL HISTORY: Essential (primary) hypertension COMPARISON: 2016, 10/01/2018, 10/22/2019, 2020, and 2021 FINDINGS: There is borderline cardiomegaly but no suggestion of failure or interval increase in the cardiac size since the previous exam.  There is ectasia of the thoracic aorta but an overall stable mediastinal configuration.  No peripheral infiltrates or pleural effusions are seen.  Degenerative changes are present in the mid and lower thoracic spine.     Chronic changes but no evidence of failure or focal infiltrate. Place of service: [facility]  Electronically signed by: Iris Plummerabdirashid Date:    07/11/2022 Time:    10:55    X-Ray Knee 3 View Left    Result Date: 7/12/2022  See Procedure Notes for results. IMPRESSION: Please see Ortho procedure notes for report.  This procedure was auto-finalized by: Virtual Radiologist   Severe left knee osteoarthritis most pronounced along the medial compartment with associated patellofemoral osteoarthritis.  There is evidence of bone necrosis within the intramedullary canal the proximal to mid tibia which appears to be stable compared to prior radiographs.    ASSESSMENT:      ICD-10-CM ICD-9-CM   1. Primary osteoarthritis of left knee  M17.12 715.16   2. Preprocedural examination  Z01.818 V72.84       PLAN:     -Findings and treatment options were discussed with the patient  -All questions answered  Natural history and expected course discussed. Questions answered.  Educational materials distributed.  Rest, ice, compression, and elevation (RICE) therapy.  I reviewed her urinalysis today which demonstrates small evidence of leukocytes but negative nitrites.  The remainder of her labs appear to be normal.  We is suitable to proceed with total knee arthroplasty.        The conservative options including NSAIDs, activity modification, physical therapy, corticosteroid injection, and viscosupplimentation were discussed. She is interested in surgical intervention at this time.    The surgical process of knee replacement was discussed in detail with the patient including a detailed discussion of the procedure itself (including visual model, x-ray review, and literature review). The typical perioperative and post-operative course was discussed and perioperative risks were discussed to the patient's satisfaction.  Risks and complications discussed included but were not limited to the risks of anesthetic complications, infection, bleeding, wound healing complications, aseptic loosening, instability, limb length inequality, neurologic  dysfunction including numbness,  DVT, pulmonary embolism, perioperative medical risks (cardiac, pulmonary, renal, neurologic), and death and the patient elects to proceed. We will initiate pre-operative medical evaluation and clearance and set a provisional date for surgical intervention according to the patient's schedule.   I have discussed anticoagulation with aspirin and coumadin and in low risk patients I have recommended aspirin twice a day.  25 minutes was spent in direct consultation with the patient counselling her on the items listed above.      There are no Patient Instructions on file for this visit.      Orders Placed This Encounter   Procedures    Urinalysis         Procedures

## 2022-07-12 NOTE — PROGRESS NOTES
CC:  Knee pain    71 y.o. Female returns to clinic for a follow up visit regarding left knee pain.  She is scheduled for left total knee arthroplasty in the coming days.  She is here today to go over her laboratory studies and discuss any in all questions related surgery.            Past Medical History:   Diagnosis Date    Anemia     Dysuria     Encounter for long-term (current) use of other medications     Encounter for screening colonoscopy 05/13/2015    Essential hypertension     Family history of polyps in the colon     Goiter     Gout     H/O bone density study 02/27/2020    History of esophagogastroduodenoscopy (EGD) 01/2020    Hypokalemia     Hypothyroidism     Insomnia     Knee pain     Screening for malignant neoplasm of cervix 01/20/2020    Screening mammogram, encounter for 07/16/2021    Andersons/negative exam/scattered fibroglandular densities/recommended anual screening    Screening mammogram, encounter for 06/11/2020     Past Surgical History:   Procedure Laterality Date    HYSTERECTOMY      TUBAL LIGATION           REVIEW OF SYSTEMS:   Constitution: Negative. Negative for chills, fever and night sweats.    Hematologic/Lymphatic: Negative for bleeding problem. Does not bruise/bleed easily.   Skin: Negative for dry skin, itching and rash.   Musculoskeletal: Negative for falls. Positive for knee pain and muscle weakness.   All other review of symptoms were reviewed and found to be noncontributory.     PHYSICAL EXAMINATION:  LMP  (LMP Unknown) Comment: partial hysterectomy  General    Constitutional: She is oriented to person, place, and time. She appears well-developed and well-nourished.   HENT:   Head: Normocephalic and atraumatic.   Eyes: Pupils are equal, round, and reactive to light.   Neck: Neck supple.   Cardiovascular: Normal rate, regular rhythm and intact distal pulses.    Pulmonary/Chest: Effort normal. No respiratory distress. She exhibits no tenderness.    Abdominal: Soft. There is no abdominal tenderness.   Neurological: She is alert and oriented to person, place, and time. She has normal reflexes. She displays normal reflexes. No cranial nerve deficit. She exhibits normal muscle tone.   Psychiatric: She has a normal mood and affect. Her behavior is normal. Judgment and thought content normal.           Right Knee Exam   Right knee exam is normal.    Left Knee Exam     Inspection   Deformity: absent    Tenderness   The patient tender to palpation of the medial joint line and lateral joint line.    Crepitus   The patient has crepitus of the patella.    Range of Motion   Extension: normal   Flexion: abnormal     Tests   Meniscus   Ronald:  Medial - positive Lateral - positive  Stability Lachman: normal (-1 to 2mm) PCL-Posterior Drawer: normal (0 to 2mm)  MCL - Valgus: normal (0 to 2mm)  LCL - Varus: normal (0 to 2mm)  Posterior Sag Test: negative  Patella   Passive Patellar Tilt: lateral tilt  Patellar Tracking: normal  Patellar Grind: positive  J-Sign: J sign absent    Other   Muscle Tightness: hamstring tightness  Sensation: normal    Muscle Strength   Left Lower Extremity   Hip Abduction: 5/5   Quadriceps:  5/5   Hamstrin/5     Reflexes     Left Side  Quadriceps:  2+        IMAGING:  X-Ray Chest PA And Lateral    Result Date: 2022  EXAMINATION: XR CHEST PA AND LATERAL CLINICAL HISTORY: Essential (primary) hypertension COMPARISON: 2016, 10/01/2018, 10/22/2019, 2020, and 2021 FINDINGS: There is borderline cardiomegaly but no suggestion of failure or interval increase in the cardiac size since the previous exam.  There is ectasia of the thoracic aorta but an overall stable mediastinal configuration.  No peripheral infiltrates or pleural effusions are seen.  Degenerative changes are present in the mid and lower thoracic spine.     Chronic changes but no evidence of failure or focal infiltrate. Place of service: [facility]  Electronically signed by: Iris Plummerabdirashid Date:    07/11/2022 Time:    10:55    X-Ray Knee 3 View Left    Result Date: 7/12/2022  See Procedure Notes for results. IMPRESSION: Please see Ortho procedure notes for report.  This procedure was auto-finalized by: Virtual Radiologist   Severe left knee osteoarthritis most pronounced along the medial compartment with associated patellofemoral osteoarthritis.  There is evidence of bone necrosis within the intramedullary canal the proximal to mid tibia which appears to be stable compared to prior radiographs.    ASSESSMENT:      ICD-10-CM ICD-9-CM   1. Primary osteoarthritis of left knee  M17.12 715.16   2. Preprocedural examination  Z01.818 V72.84       PLAN:     -Findings and treatment options were discussed with the patient  -All questions answered  Natural history and expected course discussed. Questions answered.  Educational materials distributed.  Rest, ice, compression, and elevation (RICE) therapy.  I reviewed her urinalysis today which demonstrates small evidence of leukocytes but negative nitrites.  The remainder of her labs appear to be normal.  We is suitable to proceed with total knee arthroplasty.        The conservative options including NSAIDs, activity modification, physical therapy, corticosteroid injection, and viscosupplimentation were discussed. She is interested in surgical intervention at this time.    The surgical process of knee replacement was discussed in detail with the patient including a detailed discussion of the procedure itself (including visual model, x-ray review, and literature review). The typical perioperative and post-operative course was discussed and perioperative risks were discussed to the patient's satisfaction.  Risks and complications discussed included but were not limited to the risks of anesthetic complications, infection, bleeding, wound healing complications, aseptic loosening, instability, limb length inequality, neurologic  dysfunction including numbness,  DVT, pulmonary embolism, perioperative medical risks (cardiac, pulmonary, renal, neurologic), and death and the patient elects to proceed. We will initiate pre-operative medical evaluation and clearance and set a provisional date for surgical intervention according to the patient's schedule.   I have discussed anticoagulation with aspirin and coumadin and in low risk patients I have recommended aspirin twice a day.  25 minutes was spent in direct consultation with the patient counselling her on the items listed above.      There are no Patient Instructions on file for this visit.      Orders Placed This Encounter   Procedures    Urinalysis         Procedures

## 2022-07-15 VITALS
BODY MASS INDEX: 42.99 KG/M2 | OXYGEN SATURATION: 97 % | TEMPERATURE: 98 F | HEART RATE: 67 BPM | DIASTOLIC BLOOD PRESSURE: 84 MMHG | RESPIRATION RATE: 18 BRPM | SYSTOLIC BLOOD PRESSURE: 132 MMHG | WEIGHT: 258 LBS | HEIGHT: 65 IN

## 2022-07-15 RX ORDER — POTASSIUM CHLORIDE 750 MG/1
20 TABLET, EXTENDED RELEASE ORAL 2 TIMES DAILY
Qty: 360 TABLET | Refills: 11 | Status: SHIPPED | OUTPATIENT
Start: 2022-07-15 | End: 2023-10-06 | Stop reason: SDUPTHER

## 2022-07-18 ENCOUNTER — ANESTHESIA EVENT (OUTPATIENT)
Dept: SURGERY | Facility: HOSPITAL | Age: 72
End: 2022-07-18
Payer: MEDICARE

## 2022-07-18 ENCOUNTER — ANESTHESIA (OUTPATIENT)
Dept: SURGERY | Facility: HOSPITAL | Age: 72
End: 2022-07-18
Payer: MEDICARE

## 2022-07-18 ENCOUNTER — HOSPITAL ENCOUNTER (OUTPATIENT)
Facility: HOSPITAL | Age: 72
Discharge: HOME | End: 2022-07-19
Attending: ORTHOPAEDIC SURGERY | Admitting: ORTHOPAEDIC SURGERY
Payer: MEDICARE

## 2022-07-18 DIAGNOSIS — I10 ESSENTIAL HYPERTENSION: ICD-10-CM

## 2022-07-18 DIAGNOSIS — E03.9 HYPOTHYROIDISM, UNSPECIFIED TYPE: Chronic | ICD-10-CM

## 2022-07-18 DIAGNOSIS — M17.12 OSTEOARTHRITIS OF LEFT KNEE: Primary | ICD-10-CM

## 2022-07-18 DIAGNOSIS — M10.9 GOUT, UNSPECIFIED CAUSE, UNSPECIFIED CHRONICITY, UNSPECIFIED SITE: ICD-10-CM

## 2022-07-18 DIAGNOSIS — M17.12 PRIMARY OSTEOARTHRITIS OF LEFT KNEE: ICD-10-CM

## 2022-07-18 DIAGNOSIS — R60.0 BILATERAL LOWER EXTREMITY EDEMA: ICD-10-CM

## 2022-07-18 PROBLEM — E66.01 MORBID OBESITY: Status: ACTIVE | Noted: 2022-07-18

## 2022-07-18 LAB
INDIRECT COOMBS: NORMAL
RH BLD: NORMAL

## 2022-07-18 PROCEDURE — C1776 JOINT DEVICE (IMPLANTABLE): HCPCS | Performed by: ORTHOPAEDIC SURGERY

## 2022-07-18 PROCEDURE — 94761 N-INVAS EAR/PLS OXIMETRY MLT: CPT

## 2022-07-18 PROCEDURE — 36000713 HC OR TIME LEV V EA ADD 15 MIN: Performed by: ORTHOPAEDIC SURGERY

## 2022-07-18 PROCEDURE — D9220A PRA ANESTHESIA: Mod: CRNA,,, | Performed by: NURSE ANESTHETIST, CERTIFIED REGISTERED

## 2022-07-18 PROCEDURE — 20985 CPTR-ASST DIR MS PX: CPT | Mod: LT,,, | Performed by: ORTHOPAEDIC SURGERY

## 2022-07-18 PROCEDURE — D9220A PRA ANESTHESIA: ICD-10-PCS | Mod: ANES,,, | Performed by: ANESTHESIOLOGY

## 2022-07-18 PROCEDURE — 27000221 HC OXYGEN, UP TO 24 HOURS

## 2022-07-18 PROCEDURE — 36415 COLL VENOUS BLD VENIPUNCTURE: CPT | Performed by: ORTHOPAEDIC SURGERY

## 2022-07-18 PROCEDURE — 25000003 PHARM REV CODE 250: Performed by: ORTHOPAEDIC SURGERY

## 2022-07-18 PROCEDURE — 27447 PR TOTAL KNEE ARTHROPLASTY: ICD-10-PCS | Mod: LT,,, | Performed by: ORTHOPAEDIC SURGERY

## 2022-07-18 PROCEDURE — 27000177 HC AIRWAY, LARYNGEAL MASK: Performed by: ANESTHESIOLOGY

## 2022-07-18 PROCEDURE — 37000008 HC ANESTHESIA 1ST 15 MINUTES: Performed by: ORTHOPAEDIC SURGERY

## 2022-07-18 PROCEDURE — 63600175 PHARM REV CODE 636 W HCPCS: Performed by: ORTHOPAEDIC SURGERY

## 2022-07-18 PROCEDURE — 64447 PR NERVE BLOCK INJ, ANES/STEROID, FEMORAL, INCL IMAG GUIDANCE: ICD-10-PCS | Mod: XU,LT,, | Performed by: ANESTHESIOLOGY

## 2022-07-18 PROCEDURE — 37000009 HC ANESTHESIA EA ADD 15 MINS: Performed by: ORTHOPAEDIC SURGERY

## 2022-07-18 PROCEDURE — C1713 ANCHOR/SCREW BN/BN,TIS/BN: HCPCS | Performed by: ORTHOPAEDIC SURGERY

## 2022-07-18 PROCEDURE — 25000003 PHARM REV CODE 250: Performed by: ANESTHESIOLOGY

## 2022-07-18 PROCEDURE — 64447 NJX AA&/STRD FEMORAL NRV IMG: CPT | Mod: XU,LT,, | Performed by: ANESTHESIOLOGY

## 2022-07-18 PROCEDURE — 27000510 HC BLANKET BAIR HUGGER ANY SIZE: Performed by: ANESTHESIOLOGY

## 2022-07-18 PROCEDURE — 27000655: Performed by: ANESTHESIOLOGY

## 2022-07-18 PROCEDURE — 99900035 HC TECH TIME PER 15 MIN (STAT)

## 2022-07-18 PROCEDURE — 63600175 PHARM REV CODE 636 W HCPCS: Performed by: NURSE ANESTHETIST, CERTIFIED REGISTERED

## 2022-07-18 PROCEDURE — 27201960 HC SPINAL TRAY: Performed by: ANESTHESIOLOGY

## 2022-07-18 PROCEDURE — 27447 TOTAL KNEE ARTHROPLASTY: CPT | Mod: LT,,, | Performed by: ORTHOPAEDIC SURGERY

## 2022-07-18 PROCEDURE — 71000033 HC RECOVERY, INTIAL HOUR: Performed by: ORTHOPAEDIC SURGERY

## 2022-07-18 PROCEDURE — 20985 PR CPTR-ASST SURGICAL NAVIGATION IMAGE-LESS: ICD-10-PCS | Mod: LT,,, | Performed by: ORTHOPAEDIC SURGERY

## 2022-07-18 PROCEDURE — 27000716 HC OXISENSOR PROBE, ANY SIZE: Performed by: ANESTHESIOLOGY

## 2022-07-18 PROCEDURE — 99218 PR INITIAL OBSERVATION CARE,LEVL I: ICD-10-PCS | Mod: ,,, | Performed by: HOSPITALIST

## 2022-07-18 PROCEDURE — 25000003 PHARM REV CODE 250: Performed by: NURSE ANESTHETIST, CERTIFIED REGISTERED

## 2022-07-18 PROCEDURE — 36000712 HC OR TIME LEV V 1ST 15 MIN: Performed by: ORTHOPAEDIC SURGERY

## 2022-07-18 PROCEDURE — 86850 RBC ANTIBODY SCREEN: CPT | Performed by: ORTHOPAEDIC SURGERY

## 2022-07-18 PROCEDURE — 71000039 HC RECOVERY, EACH ADD'L HOUR: Performed by: ORTHOPAEDIC SURGERY

## 2022-07-18 PROCEDURE — D9220A PRA ANESTHESIA: ICD-10-PCS | Mod: CRNA,,, | Performed by: NURSE ANESTHETIST, CERTIFIED REGISTERED

## 2022-07-18 PROCEDURE — 63600175 PHARM REV CODE 636 W HCPCS: Performed by: ANESTHESIOLOGY

## 2022-07-18 PROCEDURE — D9220A PRA ANESTHESIA: Mod: ANES,,, | Performed by: ANESTHESIOLOGY

## 2022-07-18 PROCEDURE — 27100168 OPTIME MED/SURG SUP & DEVICES NON-STERILE SUPPLY: Performed by: ORTHOPAEDIC SURGERY

## 2022-07-18 PROCEDURE — C1769 GUIDE WIRE: HCPCS | Performed by: ORTHOPAEDIC SURGERY

## 2022-07-18 PROCEDURE — 27201423 OPTIME MED/SURG SUP & DEVICES STERILE SUPPLY: Performed by: ORTHOPAEDIC SURGERY

## 2022-07-18 PROCEDURE — 99218 PR INITIAL OBSERVATION CARE,LEVL I: CPT | Mod: ,,, | Performed by: HOSPITALIST

## 2022-07-18 DEVICE — IMPLANTABLE DEVICE: Type: IMPLANTABLE DEVICE | Site: KNEE | Status: FUNCTIONAL

## 2022-07-18 DEVICE — IMP PATELLA ATTUNE MEDIALIZED DOME CEM 35MM: Type: IMPLANTABLE DEVICE | Site: KNEE | Status: FUNCTIONAL

## 2022-07-18 DEVICE — CEMENT BONE SMARTSET HV 40G: Type: IMPLANTABLE DEVICE | Site: KNEE | Status: FUNCTIONAL

## 2022-07-18 RX ORDER — BISACODYL 10 MG
10 SUPPOSITORY, RECTAL RECTAL DAILY PRN
Status: DISCONTINUED | OUTPATIENT
Start: 2022-07-18 | End: 2022-07-19 | Stop reason: HOSPADM

## 2022-07-18 RX ORDER — ONDANSETRON 2 MG/ML
INJECTION INTRAMUSCULAR; INTRAVENOUS
Status: DISCONTINUED | OUTPATIENT
Start: 2022-07-18 | End: 2022-07-18

## 2022-07-18 RX ORDER — CEFAZOLIN SODIUM 2 G/50ML
2 SOLUTION INTRAVENOUS
Status: COMPLETED | OUTPATIENT
Start: 2022-07-18 | End: 2022-07-19

## 2022-07-18 RX ORDER — MUPIROCIN 20 MG/G
1 OINTMENT TOPICAL 2 TIMES DAILY
Status: DISCONTINUED | OUTPATIENT
Start: 2022-07-18 | End: 2022-07-19 | Stop reason: HOSPADM

## 2022-07-18 RX ORDER — AMLODIPINE BESYLATE 5 MG/1
5 TABLET ORAL DAILY
Status: DISCONTINUED | OUTPATIENT
Start: 2022-07-19 | End: 2022-07-19 | Stop reason: HOSPADM

## 2022-07-18 RX ORDER — NAPROXEN SODIUM 220 MG/1
325 TABLET, FILM COATED ORAL DAILY
Status: DISCONTINUED | OUTPATIENT
Start: 2022-07-19 | End: 2022-07-19 | Stop reason: HOSPADM

## 2022-07-18 RX ORDER — OXYCODONE HYDROCHLORIDE 5 MG/1
5 TABLET ORAL EVERY 4 HOURS PRN
Status: DISCONTINUED | OUTPATIENT
Start: 2022-07-18 | End: 2022-07-19 | Stop reason: HOSPADM

## 2022-07-18 RX ORDER — PHENYLEPHRINE HYDROCHLORIDE 10 MG/ML
INJECTION INTRAVENOUS
Status: DISCONTINUED | OUTPATIENT
Start: 2022-07-18 | End: 2022-07-18

## 2022-07-18 RX ORDER — ALBUTEROL SULFATE 0.83 MG/ML
2.5 SOLUTION RESPIRATORY (INHALATION) EVERY 4 HOURS PRN
Status: DISCONTINUED | OUTPATIENT
Start: 2022-07-18 | End: 2022-07-19 | Stop reason: HOSPADM

## 2022-07-18 RX ORDER — MUPIROCIN 20 MG/G
OINTMENT TOPICAL
Status: DISCONTINUED | OUTPATIENT
Start: 2022-07-18 | End: 2022-07-18 | Stop reason: HOSPADM

## 2022-07-18 RX ORDER — BUPIVACAINE HYDROCHLORIDE 7.5 MG/ML
INJECTION, SOLUTION EPIDURAL; RETROBULBAR
Status: COMPLETED | OUTPATIENT
Start: 2022-07-18 | End: 2022-07-18

## 2022-07-18 RX ORDER — POLYETHYLENE GLYCOL 3350 17 G/17G
17 POWDER, FOR SOLUTION ORAL DAILY
Status: DISCONTINUED | OUTPATIENT
Start: 2022-07-19 | End: 2022-07-19 | Stop reason: HOSPADM

## 2022-07-18 RX ORDER — ACETAMINOPHEN 500 MG
1000 TABLET ORAL ONCE
Status: COMPLETED | OUTPATIENT
Start: 2022-07-18 | End: 2022-07-18

## 2022-07-18 RX ORDER — KETOROLAC TROMETHAMINE 30 MG/ML
INJECTION, SOLUTION INTRAMUSCULAR; INTRAVENOUS
Status: DISCONTINUED | OUTPATIENT
Start: 2022-07-18 | End: 2022-07-18

## 2022-07-18 RX ORDER — SODIUM CHLORIDE 9 MG/ML
INJECTION, SOLUTION INTRAVENOUS CONTINUOUS
Status: DISCONTINUED | OUTPATIENT
Start: 2022-07-18 | End: 2022-07-19 | Stop reason: HOSPADM

## 2022-07-18 RX ORDER — OXYCODONE HYDROCHLORIDE 5 MG/1
10 TABLET ORAL EVERY 4 HOURS PRN
Status: DISCONTINUED | OUTPATIENT
Start: 2022-07-18 | End: 2022-07-19 | Stop reason: HOSPADM

## 2022-07-18 RX ORDER — ALLOPURINOL 100 MG/1
200 TABLET ORAL DAILY
Status: DISCONTINUED | OUTPATIENT
Start: 2022-07-19 | End: 2022-07-19 | Stop reason: HOSPADM

## 2022-07-18 RX ORDER — ONDANSETRON 4 MG/1
8 TABLET, ORALLY DISINTEGRATING ORAL EVERY 8 HOURS PRN
Status: DISCONTINUED | OUTPATIENT
Start: 2022-07-18 | End: 2022-07-19 | Stop reason: HOSPADM

## 2022-07-18 RX ORDER — DOCUSATE SODIUM 100 MG/1
100 CAPSULE, LIQUID FILLED ORAL EVERY 12 HOURS
Status: DISCONTINUED | OUTPATIENT
Start: 2022-07-18 | End: 2022-07-19 | Stop reason: HOSPADM

## 2022-07-18 RX ORDER — PREGABALIN 75 MG/1
75 CAPSULE ORAL 2 TIMES DAILY
Status: DISCONTINUED | OUTPATIENT
Start: 2022-07-18 | End: 2022-07-19 | Stop reason: HOSPADM

## 2022-07-18 RX ORDER — MONTELUKAST SODIUM 10 MG/1
10 TABLET ORAL DAILY
Status: DISCONTINUED | OUTPATIENT
Start: 2022-07-19 | End: 2022-07-19 | Stop reason: HOSPADM

## 2022-07-18 RX ORDER — PROPOFOL 10 MG/ML
VIAL (ML) INTRAVENOUS
Status: DISCONTINUED | OUTPATIENT
Start: 2022-07-18 | End: 2022-07-18

## 2022-07-18 RX ORDER — SODIUM CHLORIDE 0.9 % (FLUSH) 0.9 %
3 SYRINGE (ML) INJECTION EVERY 6 HOURS PRN
Status: DISCONTINUED | OUTPATIENT
Start: 2022-07-18 | End: 2022-07-19 | Stop reason: HOSPADM

## 2022-07-18 RX ORDER — ROPIVACAINE/EPI/CLONIDINE/KET 2.46-0.005
SYRINGE (ML) INJECTION
Status: DISCONTINUED | OUTPATIENT
Start: 2022-07-18 | End: 2022-07-18 | Stop reason: HOSPADM

## 2022-07-18 RX ORDER — ACETAMINOPHEN 325 MG/1
650 TABLET ORAL EVERY 4 HOURS PRN
Status: DISCONTINUED | OUTPATIENT
Start: 2022-07-18 | End: 2022-07-19 | Stop reason: HOSPADM

## 2022-07-18 RX ORDER — EPHEDRINE SULFATE 50 MG/ML
INJECTION, SOLUTION INTRAVENOUS
Status: DISCONTINUED | OUTPATIENT
Start: 2022-07-18 | End: 2022-07-18

## 2022-07-18 RX ORDER — ZOLPIDEM TARTRATE 5 MG/1
5 TABLET ORAL NIGHTLY PRN
Status: DISCONTINUED | OUTPATIENT
Start: 2022-07-18 | End: 2022-07-19 | Stop reason: HOSPADM

## 2022-07-18 RX ORDER — MIDAZOLAM HYDROCHLORIDE 1 MG/ML
INJECTION INTRAMUSCULAR; INTRAVENOUS
Status: DISCONTINUED | OUTPATIENT
Start: 2022-07-18 | End: 2022-07-18

## 2022-07-18 RX ORDER — EPINEPHRINE 1 MG/ML
INJECTION, SOLUTION INTRACARDIAC; INTRAMUSCULAR; INTRAVENOUS; SUBCUTANEOUS
Status: DISCONTINUED | OUTPATIENT
Start: 2022-07-18 | End: 2022-07-18

## 2022-07-18 RX ORDER — LOPERAMIDE HYDROCHLORIDE 2 MG/1
4 CAPSULE ORAL ONCE
Status: DISCONTINUED | OUTPATIENT
Start: 2022-07-18 | End: 2022-07-19 | Stop reason: HOSPADM

## 2022-07-18 RX ORDER — POTASSIUM CHLORIDE 20 MEQ/1
20 TABLET, EXTENDED RELEASE ORAL 2 TIMES DAILY
Status: DISCONTINUED | OUTPATIENT
Start: 2022-07-18 | End: 2022-07-19 | Stop reason: HOSPADM

## 2022-07-18 RX ORDER — HYDROCHLOROTHIAZIDE 25 MG/1
25 TABLET ORAL DAILY
Status: DISCONTINUED | OUTPATIENT
Start: 2022-07-19 | End: 2022-07-19 | Stop reason: HOSPADM

## 2022-07-18 RX ORDER — FENTANYL CITRATE 50 UG/ML
INJECTION, SOLUTION INTRAMUSCULAR; INTRAVENOUS
Status: DISCONTINUED | OUTPATIENT
Start: 2022-07-18 | End: 2022-07-18

## 2022-07-18 RX ORDER — LIDOCAINE HYDROCHLORIDE 20 MG/ML
INJECTION, SOLUTION EPIDURAL; INFILTRATION; INTRACAUDAL; PERINEURAL
Status: DISCONTINUED | OUTPATIENT
Start: 2022-07-18 | End: 2022-07-18

## 2022-07-18 RX ORDER — LEVOTHYROXINE SODIUM 75 UG/1
75 TABLET ORAL DAILY
Status: DISCONTINUED | OUTPATIENT
Start: 2022-07-19 | End: 2022-07-19 | Stop reason: HOSPADM

## 2022-07-18 RX ORDER — LOSARTAN POTASSIUM 100 MG/1
100 TABLET ORAL DAILY
Status: DISCONTINUED | OUTPATIENT
Start: 2022-07-19 | End: 2022-07-19 | Stop reason: HOSPADM

## 2022-07-18 RX ORDER — CELECOXIB 100 MG/1
200 CAPSULE ORAL 2 TIMES DAILY
Status: DISCONTINUED | OUTPATIENT
Start: 2022-07-18 | End: 2022-07-19 | Stop reason: HOSPADM

## 2022-07-18 RX ORDER — FAMOTIDINE 20 MG/1
20 TABLET, FILM COATED ORAL 2 TIMES DAILY
Status: DISCONTINUED | OUTPATIENT
Start: 2022-07-18 | End: 2022-07-19 | Stop reason: HOSPADM

## 2022-07-18 RX ORDER — DEXAMETHASONE SODIUM PHOSPHATE 4 MG/ML
INJECTION, SOLUTION INTRA-ARTICULAR; INTRALESIONAL; INTRAMUSCULAR; INTRAVENOUS; SOFT TISSUE
Status: DISCONTINUED | OUTPATIENT
Start: 2022-07-18 | End: 2022-07-18

## 2022-07-18 RX ORDER — ALBUTEROL SULFATE 90 UG/1
2 AEROSOL, METERED RESPIRATORY (INHALATION) EVERY 4 HOURS PRN
Status: DISCONTINUED | OUTPATIENT
Start: 2022-07-18 | End: 2022-07-18

## 2022-07-18 RX ORDER — ROPIVACAINE HYDROCHLORIDE 7.5 MG/ML
INJECTION, SOLUTION EPIDURAL; PERINEURAL
Status: COMPLETED | OUTPATIENT
Start: 2022-07-18 | End: 2022-07-18

## 2022-07-18 RX ADMIN — PROPOFOL 150 MG: 10 INJECTION, EMULSION INTRAVENOUS at 11:07

## 2022-07-18 RX ADMIN — FAMOTIDINE 20 MG: 20 TABLET, FILM COATED ORAL at 09:07

## 2022-07-18 RX ADMIN — ACETAMINOPHEN 1000 MG: 500 TABLET ORAL at 09:07

## 2022-07-18 RX ADMIN — TRANEXAMIC ACID 1000 MG: 100 INJECTION, SOLUTION INTRAVENOUS at 12:07

## 2022-07-18 RX ADMIN — CEFAZOLIN SODIUM 2 G: 10 INJECTION, POWDER, FOR SOLUTION INTRAVENOUS at 06:07

## 2022-07-18 RX ADMIN — FENTANYL CITRATE 100 MCG: 50 INJECTION INTRAMUSCULAR; INTRAVENOUS at 11:07

## 2022-07-18 RX ADMIN — VANCOMYCIN HYDROCHLORIDE 1500 MG: 1 INJECTION, POWDER, LYOPHILIZED, FOR SOLUTION INTRAVENOUS at 12:07

## 2022-07-18 RX ADMIN — MIDAZOLAM 2 MG: 1 INJECTION INTRAMUSCULAR; INTRAVENOUS at 11:07

## 2022-07-18 RX ADMIN — ROPIVACAINE HYDROCHLORIDE 30 ML: 7.5 INJECTION, SOLUTION EPIDURAL; PERINEURAL at 12:07

## 2022-07-18 RX ADMIN — EPHEDRINE SULFATE 25 MG: 50 INJECTION INTRAVENOUS at 12:07

## 2022-07-18 RX ADMIN — PHENYLEPHRINE HYDROCHLORIDE 100 MCG: 10 INJECTION INTRAVENOUS at 12:07

## 2022-07-18 RX ADMIN — CEFAZOLIN 2 G: 1 INJECTION, POWDER, FOR SOLUTION INTRAMUSCULAR; INTRAVENOUS; PARENTERAL at 12:07

## 2022-07-18 RX ADMIN — EPINEPHRINE 0.01 MCG: 1 INJECTION INTRAMUSCULAR; INTRAVENOUS; SUBCUTANEOUS at 12:07

## 2022-07-18 RX ADMIN — ONDANSETRON 4 MG: 2 INJECTION INTRAMUSCULAR; INTRAVENOUS at 12:07

## 2022-07-18 RX ADMIN — KETOROLAC TROMETHAMINE 60 MG: 30 INJECTION, SOLUTION INTRAMUSCULAR at 12:07

## 2022-07-18 RX ADMIN — DOCUSATE SODIUM 100 MG: 100 CAPSULE, LIQUID FILLED ORAL at 09:07

## 2022-07-18 RX ADMIN — EPHEDRINE SULFATE 25 MG: 50 INJECTION INTRAVENOUS at 02:07

## 2022-07-18 RX ADMIN — CELECOXIB 200 MG: 100 CAPSULE ORAL at 09:07

## 2022-07-18 RX ADMIN — ZOLPIDEM TARTRATE 5 MG: 5 TABLET, COATED ORAL at 09:07

## 2022-07-18 RX ADMIN — DEXAMETHASONE SODIUM PHOSPHATE 8 MG: 4 INJECTION, SOLUTION INTRA-ARTICULAR; INTRALESIONAL; INTRAMUSCULAR; INTRAVENOUS; SOFT TISSUE at 12:07

## 2022-07-18 RX ADMIN — LIDOCAINE HYDROCHLORIDE 50 MG: 20 INJECTION, SOLUTION EPIDURAL; INFILTRATION; INTRACAUDAL; PERINEURAL at 11:07

## 2022-07-18 RX ADMIN — SODIUM CHLORIDE: 9 INJECTION, SOLUTION INTRAVENOUS at 09:07

## 2022-07-18 RX ADMIN — PREGABALIN 75 MG: 75 CAPSULE ORAL at 09:07

## 2022-07-18 RX ADMIN — POTASSIUM CHLORIDE 20 MEQ: 1500 TABLET, EXTENDED RELEASE ORAL at 09:07

## 2022-07-18 RX ADMIN — SODIUM CHLORIDE: 9 INJECTION, SOLUTION INTRAVENOUS at 01:07

## 2022-07-18 RX ADMIN — SODIUM CHLORIDE: 9 INJECTION, SOLUTION INTRAVENOUS at 11:07

## 2022-07-18 RX ADMIN — BUPIVACAINE HYDROCHLORIDE 1.5 ML: 7.5 INJECTION, SOLUTION EPIDURAL; RETROBULBAR at 12:07

## 2022-07-18 NOTE — ANESTHESIA PROCEDURE NOTES
Spinal    Diagnosis: lt tka  Patient location during procedure: OR  Start time: 7/18/2022 11:41 AM  Timeout: 7/18/2022 11:40 AM  End time: 7/18/2022 11:45 AM    Staffing  Authorizing Provider: Geronimo An MD  Performing Provider: Geronimo An MD    Preanesthetic Checklist  Completed: patient identified, IV checked, risks and benefits discussed, surgical consent, monitors and equipment checked, pre-op evaluation and timeout performed  Spinal Block  Patient position: sitting  Prep: Betadine  Patient monitoring: heart rate, continuous pulse ox, continuous capnometry and frequent blood pressure checks  Approach: midline  Location: L3-4  Injection technique: single shot  CSF Fluid: clear free-flowing CSF  Needle  Needle type: Quincke   Needle gauge: 22 G  Needle length: 4 in  Needle localization: anatomical landmarks  Assessment  Sensory level: T8   Dermatomal levels determined by alcohol wipe  Ease of block: easy  Patient's tolerance of the procedure: comfortable throughout block and no complaints  Medications:    Medications: BUPivacaine (pf) (MARCAINE) injection 0.75% - Intraspinal   1.5 mL - 7/18/2022 12:14:00 PM

## 2022-07-18 NOTE — OP NOTE
Department of Orthopedic Surgery    Operative Note  NAME: Janay Reveles    : 1950      DATE: 2022      PREOPERATIVE DIAGNOSIS: Primary osteoarthritis of left knee [M17.12]    POSTOPERATIVE DIAGNOSIS:  Primary osteoarthritis of left knee [M17.12]    PROCEDURE: left Total Knee Arthroplasty    SURGEON: Erasmo Rogers    ASSISTANT: brandyn Mcghee    ANESTHESIA: Spinal + adductor canal block    BLOOD LOSS: 55cc    TOURNIQUET TIME:  1 hour 58 mines    DRAINS: None    IMPLANTS:   Implant Name Type Inv. Item Serial No.  Lot No. LRB No. Used Action   CEMENT BONE SMARTSET HV 40G - [id number] Cement CEMENT BONE SMARTSET HV 40G  DEPUY TAYLOR ([facility])  Left 1 Implanted   CEMENT BONE SMARTSET HV 40G - [id number] Cement CEMENT BONE SMARTSET HV 40G  DEPUY TAYLOR ([facility])  Left 1 Implanted   IMP FEMORAL ATTUNE PS LT FRACISCO SZ4 - [id number] Prosthesis IMP FEMORAL ATTUNE PS LT FRACISCO SZ4  DEPUY TAYLOR ([facility]) [id number] Left 1 Implanted   IMP PATELLA ATTUNE MEDIALIZED DOME FRACISCO 35MM - [id number] Prosthesis IMP PATELLA ATTUNE MEDIALIZED DOME FRACISCO 35MM  DEPUY TAYLOR ([facility]) [id number] Left 1 Implanted   IMP BASE TIB ROT PLATFORM ATTUNE FRACISCO SZ4 - [id number] Prosthesis IMP BASE TIB ROT PLATFORM ATTUNE FRACISCO SZ4  DEPUY TAYLOR ([facility]) [id number] Left 1 Implanted   TIBIAL INSERT SIZE 4 10MM     [id number] Left 1 Implanted           INDICATIONS FOR PROCEDURE:   [unfilled] is a 71 y.o.-year-old with debilitating left-sided knee pain despite nonoperative measures and interested in knee arthroplasty.    PROCEDURE IN DETAIL:  After obtaining informed consent and starting the patient on preoperative IV antibiotics, the patient was taken back to the Operating   Room. Anesthesia was performed by Anesthesia Team. The left lower  extremity was prepped and draped in normal sterile fashion.  An Esmarch bandage was used to exsanguinate the affected lower extremity and the pneumatic tourniquet  was inflated to  300 mmHg.  A standard longitudinal midline incision was made beginning 3 fingerbreadths above the superior pole of the patella extending down to the tibial tubercle.  Full-thickness skin flaps were raised.  A medial parapatellar arthrotomy was made. The patella was then everted.    A release of the proximal medial tibia and MCL was then undertaken.  The infrapatella fat pad was resected.  An intramedullary guide was then used to perform the distal femur cut.  With the guide set at the appropriate degree of valgus alignment, the guide was inserted and the appropriate cut was made. We then used our size 5, 4-in-1 cutting block to make our anterior, posterior, and anterior and posterior chamfer cuts. The tibial cut was addressed next.  Computer navigation was used in this case to verify the correct tibial alignment.  2 guidepins were placed percutaneously in the tibia, and waypoints were inserted into the [employer] software.  An extramedullary tibial guide was also used to verify corrected alignment.  The proximal tibia was then cut. The tibia was then drilled and punched.  Trial components were placed on the femur and tibia.   Flexion and extension gaps were symmetric and balanced at this point.    The trial components demonstrated tightness in flexion.  We downsized the femur at this point to a 4 and converted to a PS knee and this balanced the knee nicely.     The patella was addressed next.  A guide was utilized to resect 9.5 mm of bone.  A drill guide was applied to flat patella surface and appropriate drill holes were made.  A trial patella button was placed.  The patella tracked nicely.  No lateral release was required.  We then Pulsavac irrigated cancellous bone and vacuum mixed the cement.      The final components were then implanted.  The tibial component was placed first, followed by the femoral component.  Excess cement was removed and a trial polyethylene was placed.  The cemented patella was then placed  and excess cement was removed.   Once the cement hardened,the tourniquet was released.  Bleeders were coagulated.  1 gram of TXA was given at this point. The appropriate dose of pericapsular injection was then administered, focusing on the posterior capsule.  Final polyethylene component was placed.  Satisfactory range of motion and stability was demonstrated.    The extensor retinaculum was then clsoed with #1 Vicryl figure of eight sutures with the knee in slight flexion, the subcutaneous tissue with 2-0 Vicryl, skin reapproximated with staples.  A sterile dressing was applied, as was a knee immobilizer.  Patient was then taken to the recovery room in stable condition.

## 2022-07-18 NOTE — DISCHARGE INSTR - SUPPLEMENTARY INSTRUCTIONS
ANKLE: Pumps        Point toes down, then up. Repeat 30 times, 2 sessions per day        Quad Set        With other leg bent, foot flat, slowly tighten muscles on left thigh of straight leg while counting out loud to 5.  Repeat 30 times, 2 sessions per day.          Hip Abduction / Adduction: with Extended Knee (Supine)        Bring left leg out to side and return. Keep knee straight.  Repeat 30 times, 2 sessions per day.         HIP / KNEE: Flexion, Heel Slides - Supine        Slide left heel up toward buttocks, keeping leg in straight line. Repeat 30 times, 2 sessions per day.  Use towel or pillowcase under heel as needed.       Straight Leg Raise        Bend right leg. Raise left leg 8-12 inches with knee locked. Exhale and tighten thigh muscles while raising leg.   Repeat 30 times, 2 sessions per day.           KNEE: Extension, Long Arc Quads - Sitting        Raise left leg until knee is straight.  Repeat 30 times, 2 sessions per day        KNEE: Flexion / Extension - Sitting        Sit at edge of surface, foot on towel or pillowcase. Bend and straighten left knee.  Repeat 30 times, 2 sessions per day.   Use opposite leg to increase knee flexion.    Copyright © [employer]. All rights reserved.

## 2022-07-18 NOTE — ANESTHESIA PREPROCEDURE EVALUATION
"                                                                                                             07/18/2022  Janay Reveles is a 71 y.o., female.      Pre-op Assessment    I have reviewed the Patient Summary Reports.    I have reviewed the NPO Status.   I have reviewed the Medications.     Review of Systems         Anesthesia Plan  Type of Anesthesia, risks & benefits discussed:    Anesthesia Type: Gen Supraglottic Airway, Regional, Spinal  Intra-op Monitoring Plan: Standard ASA Monitors  Post Op Pain Control Plan: multimodal analgesia  Induction:  IV  Informed Consent: Informed consent signed with the Patient and all parties understand the risks and agree with anesthesia plan.  All questions answered.   ASA Score: 2    Ready For Surgery From Anesthesia Perspective.     .  NPO greater than 8 hours  NAC  NKDA    Hct 36  K 3.4  7/11/22 EKG: SR 67 bpm    Medical History   Screening mammogram, encounter for Hypothyroidism   Anemia Dysuria   Essential hypertension Goiter   Gout Family history of polyps in the colon   Hypokalemia Insomnia   Encounter for long-term (current) use of other medications H/O bone density study   Screening mammogram, encounter for Encounter for screening colonoscopy   History of esophagogastroduodenoscopy (EGD) Screening for malignant neoplasm of cervix   Knee pain    Surgical clearance from Dr. Charo Blankenship is on the chart: "She is cleared for surgery from my standpoint."    Airway exam deferred (COVID precautions); adequate ROM at neck.    Plan is general plus adductor canal block    "

## 2022-07-18 NOTE — PROGRESS NOTES
No complaints/needs voiced. Nad noted. Vss. Safety measures ongoing. Report given. Care released. 97.9, 20, 99%, 86, 121/80.

## 2022-07-18 NOTE — ANESTHESIA POSTPROCEDURE EVALUATION
Anesthesia Post Evaluation    Patient: Janay Reveles    Procedure(s) Performed: Procedure(s) (LRB):  ARTHROPLASTY, KNEE, TOTAL, USING COMPUTER-ASSISTED NAVIGATION (Left)    Final Anesthesia Type: general      Patient location during evaluation: PACU  Patient participation: Yes- Able to Participate  Level of consciousness: awake and sedated  Post-procedure vital signs: reviewed and stable  Pain management: adequate  Airway patency: patent    PONV status at discharge: No PONV  Anesthetic complications: no      Cardiovascular status: blood pressure returned to baseline  Respiratory status: unassisted  Hydration status: euvolemic  Follow-up not needed.          Vitals Value Taken Time   /81 07/18/22 1630   Temp 37.1 °C (98.7 °F) 07/18/22 1536   Pulse 93 07/18/22 1630   Resp 23 07/18/22 1630   SpO2 96 % 07/18/22 1630   Vitals shown include unvalidated device data.      No case tracking events are documented in the log.      Pain/Briana Score: Pain Rating Prior to Med Admin: 4 (7/18/2022  9:14 AM)  Briana Score: 10 (7/18/2022  4:26 PM)

## 2022-07-18 NOTE — DISCHARGE INSTRUCTIONS
*Keep dressing dry and intact, do not remove dressing, if dressing becomes wet or bloody notify home health staff.  Home Health will change your dressing post of day #3 a give them that special dressing we sent home with you. If patient has a BRODIE system leave on for 7 days then change dressing.  *Continue incentive spirometry at least every 2 hours while awake.  *Continue white stockings remove 2 times a day for 1 hour and replace. Once dressing is changed they will apply the other stocking to surgery leg.  *Elevate surgery leg at ankle on pillow, no pillow under knees.  *Take laxative of choice to have a bowel movement at least by tomorrow and then every other day.  *Increase fluids by mouth.  *Dr. Erasmo Rogers total knees should wear knee immobilizer at night and remove during the day.  *Staples will be removed at follow up appointment  *Notify home health staff if any concerns.

## 2022-07-18 NOTE — TRANSFER OF CARE
"Anesthesia Transfer of Care Note    Patient: Janay Reveles    Procedure(s) Performed: Procedure(s) (LRB):  ARTHROPLASTY, KNEE, TOTAL, USING COMPUTER-ASSISTED NAVIGATION (Left)    Patient location: PACU    Anesthesia Type: general    Transport from OR: Transported from OR on 2-3 L/min O2 by NC with adequate spontaneous ventilation    Post pain: adequate analgesia    Post assessment: no apparent anesthetic complications and tolerated procedure well    Post vital signs: stable    Level of consciousness: responds to stimulation and sedated    Nausea/Vomiting: no nausea/vomiting    Complications: none    Transfer of care protocol was followed      Last vitals:   Visit Vitals  /70 (BP Location: Left arm, Patient Position: Lying)   Pulse 92   Temp 37.1 °C (98.7 °F) (Oral)   Resp (!) 21   Ht 5' 5" (1.651 m)   Wt 116.1 kg (256 lb)   SpO2 97%   Breastfeeding No   BMI 42.60 kg/m²     "

## 2022-07-18 NOTE — ANESTHESIA PROCEDURE NOTES
Peripheral Block    Patient location during procedure: OR   Block not for primary anesthetic.  Reason for block: at surgeon's request and post-op pain management   Post-op Pain Location: lt tka   Start time: 7/18/2022 11:51 AM  Timeout: 7/18/2022 11:50 AM   End time: 7/18/2022 12:00 PM    Staffing  Authorizing Provider: Geronimo An MD  Performing Provider: Geronimo An MD    Preanesthetic Checklist  Completed: patient identified, IV checked, site marked, risks and benefits discussed, surgical consent, monitors and equipment checked, pre-op evaluation and timeout performed  Peripheral Block  Patient position: supine  Prep: ChloraPrep  Patient monitoring: heart rate, cardiac monitor, continuous pulse ox, continuous capnometry and frequent blood pressure checks  Block type: adductor canal  Laterality: left  Injection technique: single shot  Needle  Needle type: Stimuplex   Needle gauge: 20 G  Needle length: 4 in  Needle localization: ultrasound guidance   -ultrasound image captured on disc.  Assessment  Injection assessment: negative aspiration, negative parasthesia and local visualized surrounding nerve  Paresthesia pain: none  Heart rate change: no  Slow fractionated injection: yes  Pain Tolerance: comfortable throughout block and no complaints  Medications:    Medications: ROPIvacaine (NAROPIN) injection 0.75% - Perineural   30 mL - 7/18/2022 12:02:00 PM

## 2022-07-18 NOTE — PT/OT/SLP PROGRESS
Physical Therapy      Patient Name:  Janay Reveles   MRN:  [id number]    Patient not seen today secondary to Other (Comment) (PT order recieved but pt still in PACU at 18:00.). Will follow-up tomorrow AM.

## 2022-07-18 NOTE — ANESTHESIA PROCEDURE NOTES
Intubation    Date/Time: 7/18/2022 11:55 AM  Performed by: Gareth Bellamy CRNA  Authorized by: Geronimo An MD     Intubation:     Induction:  Intravenous    Intubated:  Postinduction    Mask Ventilation:  Easy mask    Attempts:  1    Attempted By:  CRNA    Method of Intubation:  Direct    Difficult Airway Encountered?: No      Complications:  None    Airway Device:  Supraglottic airway/LMA    Airway Device Size:  4.0    Style/Cuff Inflation:  Cuffed (inflated to minimal occlusive pressure)    Placement Verified By:  Capnometry    Complicating Factors:  None    Findings Post-Intubation:  BS equal bilateral and atraumatic/condition of teeth unchanged

## 2022-07-19 VITALS
WEIGHT: 256 LBS | OXYGEN SATURATION: 99 % | HEART RATE: 65 BPM | SYSTOLIC BLOOD PRESSURE: 123 MMHG | TEMPERATURE: 98 F | RESPIRATION RATE: 20 BRPM | BODY MASS INDEX: 42.65 KG/M2 | DIASTOLIC BLOOD PRESSURE: 69 MMHG | HEIGHT: 65 IN

## 2022-07-19 PROBLEM — R73.9 HYPERGLYCEMIA: Status: ACTIVE | Noted: 2022-07-19

## 2022-07-19 LAB
ANION GAP SERPL CALCULATED.3IONS-SCNC: 12 MMOL/L (ref 7–16)
BASOPHILS # BLD AUTO: 0.03 K/UL (ref 0–0.2)
BASOPHILS NFR BLD AUTO: 0.2 % (ref 0–1)
BUN SERPL-MCNC: 18 MG/DL (ref 7–18)
BUN/CREAT SERPL: 13 (ref 6–20)
CALCIUM SERPL-MCNC: 8.4 MG/DL (ref 8.5–10.1)
CHLORIDE SERPL-SCNC: 110 MMOL/L (ref 98–107)
CO2 SERPL-SCNC: 21 MMOL/L (ref 21–32)
CREAT SERPL-MCNC: 1.41 MG/DL (ref 0.55–1.02)
DIFFERENTIAL METHOD BLD: ABNORMAL
EOSINOPHIL # BLD AUTO: 0.03 K/UL (ref 0–0.5)
EOSINOPHIL NFR BLD AUTO: 0.2 % (ref 1–4)
ERYTHROCYTE [DISTWIDTH] IN BLOOD BY AUTOMATED COUNT: 16.9 % (ref 11.5–14.5)
GLUCOSE SERPL-MCNC: 198 MG/DL (ref 74–106)
HCT VFR BLD AUTO: 32.5 % (ref 38–47)
HGB BLD-MCNC: 10.5 G/DL (ref 12–16)
IMM GRANULOCYTES # BLD AUTO: 0.06 K/UL (ref 0–0.04)
IMM GRANULOCYTES NFR BLD: 0.3 % (ref 0–0.4)
LYMPHOCYTES # BLD AUTO: 0.95 K/UL (ref 1–4.8)
LYMPHOCYTES NFR BLD AUTO: 5.3 % (ref 27–41)
MCH RBC QN AUTO: 27.9 PG (ref 27–31)
MCHC RBC AUTO-ENTMCNC: 32.3 G/DL (ref 32–36)
MCV RBC AUTO: 86.2 FL (ref 80–96)
MONOCYTES # BLD AUTO: 0.93 K/UL (ref 0–0.8)
MONOCYTES NFR BLD AUTO: 5.2 % (ref 2–6)
MPC BLD CALC-MCNC: 9.3 FL (ref 9.4–12.4)
NEUTROPHILS # BLD AUTO: 15.88 K/UL (ref 1.8–7.7)
NEUTROPHILS NFR BLD AUTO: 88.8 % (ref 53–65)
NRBC # BLD AUTO: 0 X10E3/UL
NRBC, AUTO (.00): 0 %
PLATELET # BLD AUTO: 482 K/UL (ref 150–400)
POTASSIUM SERPL-SCNC: 4.5 MMOL/L (ref 3.5–5.1)
RBC # BLD AUTO: 3.77 M/UL (ref 4.2–5.4)
SARS-COV-2 RDRP RESP QL NAA+PROBE: NEGATIVE
SODIUM SERPL-SCNC: 138 MMOL/L (ref 136–145)
WBC # BLD AUTO: 17.88 K/UL (ref 4.5–11)

## 2022-07-19 PROCEDURE — 97165 OT EVAL LOW COMPLEX 30 MIN: CPT

## 2022-07-19 PROCEDURE — 63600175 PHARM REV CODE 636 W HCPCS: Performed by: ORTHOPAEDIC SURGERY

## 2022-07-19 PROCEDURE — 85025 COMPLETE CBC W/AUTO DIFF WBC: CPT | Performed by: ORTHOPAEDIC SURGERY

## 2022-07-19 PROCEDURE — 25000003 PHARM REV CODE 250: Performed by: ORTHOPAEDIC SURGERY

## 2022-07-19 PROCEDURE — 99226 PR SUBSEQUENT OBSERVATION CARE,LEVEL III: ICD-10-PCS | Mod: ,,, | Performed by: HOSPITALIST

## 2022-07-19 PROCEDURE — 97161 PT EVAL LOW COMPLEX 20 MIN: CPT

## 2022-07-19 PROCEDURE — 36415 COLL VENOUS BLD VENIPUNCTURE: CPT | Performed by: ORTHOPAEDIC SURGERY

## 2022-07-19 PROCEDURE — 27000221 HC OXYGEN, UP TO 24 HOURS

## 2022-07-19 PROCEDURE — 97116 GAIT TRAINING THERAPY: CPT

## 2022-07-19 PROCEDURE — 94761 N-INVAS EAR/PLS OXIMETRY MLT: CPT

## 2022-07-19 PROCEDURE — 80048 BASIC METABOLIC PNL TOTAL CA: CPT | Performed by: ORTHOPAEDIC SURGERY

## 2022-07-19 PROCEDURE — 99226 PR SUBSEQUENT OBSERVATION CARE,LEVEL III: CPT | Mod: ,,, | Performed by: HOSPITALIST

## 2022-07-19 PROCEDURE — 87635 SARS-COV-2 COVID-19 AMP PRB: CPT | Performed by: ORTHOPAEDIC SURGERY

## 2022-07-19 PROCEDURE — 97110 THERAPEUTIC EXERCISES: CPT

## 2022-07-19 RX ORDER — OXYCODONE AND ACETAMINOPHEN 10; 325 MG/1; MG/1
1 TABLET ORAL EVERY 6 HOURS PRN
Qty: 30 TABLET | Refills: 0 | Status: SHIPPED | OUTPATIENT
Start: 2022-07-19 | End: 2022-08-01

## 2022-07-19 RX ORDER — AMOXICILLIN 250 MG
1 CAPSULE ORAL 2 TIMES DAILY
Qty: 60 TABLET | Refills: 2 | Status: SHIPPED | OUTPATIENT
Start: 2022-07-19

## 2022-07-19 RX ORDER — SODIUM CHLORIDE 0.9 % (FLUSH) 0.9 %
2 SYRINGE (ML) INJECTION EVERY 6 HOURS PRN
Status: DISCONTINUED | OUTPATIENT
Start: 2022-07-19 | End: 2022-07-19 | Stop reason: HOSPADM

## 2022-07-19 RX ORDER — ONDANSETRON 4 MG/1
8 TABLET, ORALLY DISINTEGRATING ORAL EVERY 8 HOURS PRN
Qty: 30 TABLET | Refills: 0 | Status: SHIPPED | OUTPATIENT
Start: 2022-07-19 | End: 2023-04-13

## 2022-07-19 RX ORDER — ASPIRIN 325 MG
325 TABLET ORAL DAILY
Qty: 30 TABLET | Refills: 0 | Status: SHIPPED | OUTPATIENT
Start: 2022-07-19 | End: 2023-07-19

## 2022-07-19 RX ADMIN — ASPIRIN 81 MG CHEWABLE TABLET 324 MG: 81 TABLET CHEWABLE at 12:07

## 2022-07-19 RX ADMIN — POLYETHYLENE GLYCOL 3350 17 G: 17 POWDER, FOR SOLUTION ORAL at 12:07

## 2022-07-19 RX ADMIN — POTASSIUM CHLORIDE 20 MEQ: 1500 TABLET, EXTENDED RELEASE ORAL at 12:07

## 2022-07-19 RX ADMIN — HYDROCHLOROTHIAZIDE 25 MG: 25 TABLET ORAL at 12:07

## 2022-07-19 RX ADMIN — FAMOTIDINE 20 MG: 20 TABLET, FILM COATED ORAL at 12:07

## 2022-07-19 RX ADMIN — MONTELUKAST 10 MG: 10 TABLET, FILM COATED ORAL at 12:07

## 2022-07-19 RX ADMIN — PREGABALIN 75 MG: 75 CAPSULE ORAL at 12:07

## 2022-07-19 RX ADMIN — CEFAZOLIN SODIUM 2 G: 10 INJECTION, POWDER, FOR SOLUTION INTRAVENOUS at 02:07

## 2022-07-19 RX ADMIN — ALLOPURINOL 200 MG: 100 TABLET ORAL at 12:07

## 2022-07-19 RX ADMIN — DOCUSATE SODIUM 100 MG: 100 CAPSULE, LIQUID FILLED ORAL at 12:07

## 2022-07-19 RX ADMIN — SODIUM CHLORIDE: 9 INJECTION, SOLUTION INTRAVENOUS at 02:07

## 2022-07-19 RX ADMIN — AMLODIPINE BESYLATE 5 MG: 5 TABLET ORAL at 12:07

## 2022-07-19 RX ADMIN — LOSARTAN POTASSIUM 100 MG: 100 TABLET, FILM COATED ORAL at 12:07

## 2022-07-19 RX ADMIN — OXYCODONE HYDROCHLORIDE 5 MG: 5 TABLET ORAL at 12:07

## 2022-07-19 RX ADMIN — OXYCODONE HYDROCHLORIDE 10 MG: 5 TABLET ORAL at 03:07

## 2022-07-19 RX ADMIN — LEVOTHYROXINE SODIUM 75 MCG: 75 TABLET ORAL at 12:07

## 2022-07-19 RX ADMIN — CELECOXIB 200 MG: 100 CAPSULE ORAL at 12:07

## 2022-07-19 NOTE — PROGRESS NOTES
Daily Orthopaedic Progress Note    Janay Reveles is a 71 y.o. female admitted on 7/18/2022  Hospital Day: 0  Post Op Day: 1 Day Post-Op    Antibiotics (From admission, onward)            Start     Stop Route Frequency Ordered    07/18/22 2100  mupirocin 2 % ointment 1 g         07/23 2059 Nasl 2 times daily 07/18/22 1842           The patient was seen and examined this morning at the bedside. Patient reports no acute issues overnight and adequate control of pain on current regimen.  Patient worked with physical therapy over the last 24 hours.      PHYSICAL EXAM:  Awake/alert/oriented x3, No acute distress, Afebrile, Vital signs stable  Normocephalic, Atraumatic  Good inspiratory effort with unlaboured breathing  Dressings clean/dry/intact, Operative limb neurovascularly intact with 5/5 strength distally and sensation intact to light touch distally; some decreased sensation over the area of the regional block and palpable pulses  Vitals:    07/19/22 0700 07/19/22 0724 07/19/22 1100 07/19/22 1228   BP: 122/72  123/69    Pulse: 77  65    Resp: 15  18 20   Temp:   97.6 °F (36.4 °C)    TempSrc:       SpO2: 99% 99% 99%    Weight:       Height:         I/O last 3 completed shifts:  In: 1400 [IV Piggyback:1400]  Out: -     Significant Labs:  Recent Lab Results       07/19/22  1226   07/19/22  0420   07/19/22  0419        Anion Gap   12         Baso #     0.03       Basophil %     0.2       BUN   18         BUN/CREAT RATIO   13         Calcium   8.4         Chloride   110         CO2   21         ID NOW COVID-19, ([facility]) Negative           Creatinine   1.41         Differential Type     Auto       eGFR if non    39         Eos #     0.03       Eosinophil %     0.2       Glucose   198         Hematocrit     32.5       Hemoglobin     10.5       Immature Grans (Abs)     0.06       Immature Granulocytes     0.3       Lymph #     0.95       Lymph %     5.3       MCH     27.9       MCHC     32.3       MCV      86.2       Mono #     0.93       Mono %     5.2       MPV     9.3       Neutrophils, Abs     15.88       Neutrophils Relative     88.8       nRBC     0.0       NUCLEATED RBC ABSOLUTE     0.00       Platelets     482       Potassium   4.5         RBC     3.77       RDW     16.9       Sodium   138         WBC     17.88             Significant Diagnostics:  X-Ray Knee 1 or 2 View Left  Narrative: EXAMINATION:  XR KNEE 1 OR 2 VIEW LEFT    CLINICAL HISTORY:  post op;Unilateral primary osteoarthritis, left knee    TECHNIQUE:  XR KNEE 1 OR 2 VIEW LEFT    COMPARISON:  07/12/2022    FINDINGS:  Expected postoperative findings from left total knee arthroplasty hardware placement.  The hardware is in expected position.  Expected soft tissue swelling.  Impression: Expected postoperative findings from left total knee arthroplasty hardware placement.    Electronically signed by: Sukhdev Buenrostro  Date:    07/18/2022  Time:    16:04       A/P: 71 y.o. female 1 Day Post-Op s/p left tka  -Continue with current pain control regimen  -Continue with current physical therapy plan  -Continue with DVT prophylaxis ( mg po q day)  -Anticipate discharge to [facility] bed today    Erasmo Rogers MD  Orthopaedic Staff Surgeon

## 2022-07-19 NOTE — PLAN OF CARE
Problem: Physical Therapy  Goal: Physical Therapy Goal  Description: Short Term Goals to be met by: 2022    Patient will increase functional independence with mobility by performin. Supine to sit with Modified [place]  2. Sit to stand transfer with Modified [place]  3. Bed to chair transfer with Modified [place] using Rolling Walker, WBAT left LE  4. Gait  x 100 feet with Modified [place] using Rolling Walker, WBAT left LE.   5. Lower extremity exercise program x30 reps per handout, with assistance as needed  6. Knee ROM 0-90    Long Term Goals to be met by: 2022    Pt will regain full independent functional mobility to return to prior activities of daily living.   Outcome: Ongoing, Progressing       Patient participated well with PT interventions and is motivated to regain full independent mobility. Patient will benefit from swing bed to optimize TKR rehab/recovery

## 2022-07-19 NOTE — SUBJECTIVE & OBJECTIVE
Past Medical History:   Diagnosis Date    Anemia     Dysuria     Encounter for long-term (current) use of other medications     Encounter for screening colonoscopy 05/13/2015    Essential hypertension     Family history of polyps in the colon     Goiter     Gout     H/O bone density study 02/27/2020    History of esophagogastroduodenoscopy (EGD) 01/2020    Hypokalemia     Hypothyroidism     Insomnia     Knee pain     Screening for malignant neoplasm of cervix 01/20/2020    Screening mammogram, encounter for 07/16/2021    [facility]ersons/negative exam/scattered fibroglandular densities/recommended anual screening    Screening mammogram, encounter for 06/11/2020       Past Surgical History:   Procedure Laterality Date    HYSTERECTOMY      TUBAL LIGATION         Review of patient's allergies indicates:  No Known Allergies    No current facility-administered medications on file prior to encounter.     Current Outpatient Medications on File Prior to Encounter   Medication Sig    allopurinoL (ZYLOPRIM) 100 MG tablet Take 2 tablets (200 mg total) by mouth once daily.    montelukast (SINGULAIR) 10 mg tablet Take 1 tablet (10 mg total) by mouth once daily.    naproxen (NAPROSYN) 500 MG tablet Take 1 tablet (500 mg total) by mouth 2 (two) times daily as needed (pain).    albuterol (PROVENTIL/VENTOLIN HFA) 90 mcg/actuation inhaler Inhale 2 puffs into the lungs every 4 (four) hours as needed for Wheezing. Rescue    zolpidem (AMBIEN) 10 mg Tab Take 1 tablet (10 mg total) by mouth nightly as needed (insomnia).     Family History    None       Tobacco Use    Smoking status: Never Smoker    Smokeless tobacco: Never Used   Substance and Sexual Activity    Alcohol use: Never    Drug use: Never    Sexual activity: Not Currently     Review of Systems   Constitutional:  Negative for chills, fatigue and fever.   HENT:  Negative for sore throat.    Eyes:  Negative for visual disturbance.   Respiratory:  Negative for cough and shortness of  breath.    Cardiovascular:  Negative for chest pain, palpitations and leg swelling.   Gastrointestinal:  Negative for abdominal pain, constipation, diarrhea, nausea, rectal pain and vomiting.   Genitourinary:  Negative for dysuria and hematuria.   Musculoskeletal:  Positive for arthralgias. Negative for myalgias.   Skin:  Negative for rash.   Neurological:  Negative for dizziness, light-headedness, numbness and headaches.   Objective:     Vital Signs (Most Recent):  Temp: 97.7 °F (36.5 °C) (07/18/22 1950)  Pulse: 82 (07/18/22 1950)  Resp: 18 (07/18/22 1950)  BP: 138/78 (07/18/22 1950)  SpO2: 99 % (07/18/22 2007) Vital Signs (24h Range):  Temp:  [97.7 °F (36.5 °C)-98.7 °F (37.1 °C)] 97.7 °F (36.5 °C)  Pulse:  [] 82  Resp:  [13-29] 18  SpO2:  [92 %-99 %] 99 %  BP: (119-158)/(70-94) 138/78     Weight: 116.1 kg (256 lb)  Body mass index is 42.6 kg/m².    Physical Exam  Vitals reviewed.   Constitutional:       General: She is not in acute distress.     Appearance: She is obese. She is not ill-appearing, toxic-appearing or diaphoretic.   HENT:      Head: Normocephalic and atraumatic.      Comments: 2L Nasal cannula in place      Right Ear: External ear normal.      Left Ear: External ear normal.      Mouth/Throat:      Mouth: Mucous membranes are moist.   Eyes:      General: No scleral icterus.     Pupils: Pupils are equal, round, and reactive to light.   Cardiovascular:      Rate and Rhythm: Normal rate and regular rhythm.      Heart sounds: Normal heart sounds. No murmur heard.    No friction rub. No gallop.   Pulmonary:      Effort: Pulmonary effort is normal. No respiratory distress.      Breath sounds: Normal breath sounds. No wheezing, rhonchi or rales.   Abdominal:      General: Bowel sounds are normal.      Palpations: Abdomen is soft.      Tenderness: There is no abdominal tenderness. There is no guarding or rebound.      Comments: Midline surgical scar   Musculoskeletal:         General: No swelling.       Right lower leg: Edema present.      Left lower leg: Edema present.      Comments: Cryotherapeutic brace noted to LLE   Skin:     General: Skin is warm and dry.      Coloration: Skin is not jaundiced.      Findings: No erythema or rash.   Neurological:      Mental Status: She is alert and oriented to person, place, and time.       Significant Labs: All pertinent labs within the past 24 hours have been reviewed.    Significant Imaging: I have reviewed all pertinent imaging results/findings within the past 24 hours.

## 2022-07-19 NOTE — PLAN OF CARE
[facility] - Orthopedic  Discharge Final Note    Primary Care Provider: Charo Blankenship MD    Expected Discharge Date: 7/19/2022    Final Discharge Note (most recent)     Final Note - 07/19/22 1355        Final Note    Assessment Type Final Discharge Note     Anticipated Discharge Disposition Rehab Facility   [facility]    What phone number can be called within the next 1-3 days to see how you are doing after discharge? [phone]        Post-Acute Status    Post-Acute Authorization Placement;Home Health     Post-Acute Placement Status Set-up Complete/Auth obtained     Home Health Status Set-up Complete/Auth obtained     Patient choice form signed by patient/caregiver List with quality metrics by geographic area provided;List from CMS Compare     Discharge Delays None known at this time                 Important Message from Medicare              Follow-up providers     PATRICIA Llamas   Specialty: Family Medicine, Emergency Medicine, Orthopedic Surgery    [address]  [facility] [address]   Phone: [phone]       Next Steps: Follow up in 13 day(s)    Instructions: please follow up on August 1 at 8:30          After-discharge care            Destination     [facility]   Service[facility]patient [facility]    [address] [facility] [address]   Phone: [phone]             Home Medical Care     [facility]   Service: Home Health Services    [address] [facility] [address]   Phone: [phone]                       Pt accepted for [facility] MAURICIO faxed dc orders and covid. Pkt left in cubby. SW informed pt of acceptance. No other needs.

## 2022-07-19 NOTE — PT/OT/SLP PROGRESS
Physical Therapy Treatment    Patient Name:  Janay Reveles   MRN:  [id number]    Recommendations:     Discharge Recommendations:  rehabilitation facility, nursing facility, skilled   Discharge Equipment Recommendations: walker, rolling   Barriers to discharge: None    Assessment:     Janay Reveles is a 71 y.o. female admitted with a medical diagnosis of Osteoarthritis of left knee.  She presents with the following impairments/functional limitations:  weakness, impaired endurance, impaired self care skills, impaired functional mobility, gait instability, impaired balance, decreased lower extremity function, pain, decreased ROM, edema, orthopedic precautions .    Patient participates well with PT interventions despite post-op pain. Patient awaiting transition to rehab.    Rehab Prognosis: Good; patient would benefit from acute skilled PT services to address these deficits and reach maximum level of function.    Recent Surgery: Procedure(s) (LRB):  ARTHROPLASTY, KNEE, TOTAL, USING COMPUTER-ASSISTED NAVIGATION (Left) 1 Day Post-Op    Plan:     During this hospitalization, patient to be seen BID (5x/week; daily 2x/week) to address the identified rehab impairments via gait training, therapeutic activities, therapeutic exercises and progress toward the following goals:    · Plan of Care Expires:  08/19/22    Subjective     Chief Complaint: left TKR  Patient/Family Comments/goals: Patient willing to participate with PT interventions.  Pain/Comfort:  · Pain Rating 1: 4/10  · Location - Side 1: Right  · Location 1: knee  · Pain Addressed 1: Pre-medicate for activity, Reposition, Distraction, Cessation of Activity  · Pain Rating Post-Intervention 1: 4/10      Objective:     Communicated with CAROLINE Beasley prior to session.  Patient found up in chair with peripheral IV, cryotherapy, wound vac upon PT entry to room.     General Precautions: Standard, fall   Orthopedic Precautions:LLE weight bearing as tolerated   Braces:  N/A  Respiratory Status: Room air     Functional Mobility:  · Transfers:     · Sit to Stand:  minimum assistance and verbal cues with rolling walker  · Gait: 45' with RW CGA and verbal cues; step to pattern, short step lengths; mild axial flexion with forward eye gaze      AM-PAC 6 CLICK MOBILITY  Turning over in bed (including adjusting bedclothes, sheets and blankets)?: 3  Sitting down on and standing up from a chair with arms (e.g., wheelchair, bedside commode, etc.): 3  Moving from lying on back to sitting on the side of the bed?: 3  Moving to and from a bed to a chair (including a wheelchair)?: 3  Need to walk in hospital room?: 3  Climbing 3-5 steps with a railing?: 1  Basic Mobility Total Score: 16       Therapeutic Activities and Exercises:   Left lower extremity exercise x 30 reps: ankle pumps, Quad sets, glut sets, long arc quads, heel slides, hip abduction/adduction, and straight leg raises with active assist ROM, verbal cues, and tactile cues     Seated knee flexion stretch with prolonged hold    Left knee ROM: 0-71    Gait per above    Patient left up in chair with all lines intact, call button in reach, RN Eva Beasley notified and family present..    GOALS:   Multidisciplinary Problems     Physical Therapy Goals        Problem: Physical Therapy    Goal Priority Disciplines Outcome Goal Variances Interventions   Physical Therapy Goal     PT, PT/OT Ongoing, Progressing     Description: Short Term Goals to be met by: 2022    Patient will increase functional independence with mobility by performin. Supine to sit with Modified [place]  2. Sit to stand transfer with Modified [place]  3. Bed to chair transfer with Modified [place] using Rolling Walker, WBAT left LE  4. Gait  x 100 feet with Modified [place] using Rolling Walker, WBAT left LE.   5. Lower extremity exercise program x30 reps per handout, with assistance as needed  6. Knee ROM 0-90    Long Term Goals to be met  by: 9/19/2022    Pt will regain full independent functional mobility to return to prior activities of daily living.                    Time Tracking:     PT Received On: 07/19/22  PT Start Time: 1418     PT Stop Time: 1443  PT Total Time (min): 25 min     Billable Minutes: Gait Training 10 minutes and Therapeutic Exercise 15 minutes    Treatment Type: Treatment  PT/PTA: PT     PTA Visit Number: 0     07/19/2022

## 2022-07-19 NOTE — CONSULTS
[facility] Medicine  Consult Note    Patient Name: Janay Reveles  MRN: [id number]  Admission Date: 7/18/2022  Hospital Length of Stay: 0 days  Attending Physician: Erasmo Rogers MD   Primary Care Provider: Charo Blankenship MD           Patient information was obtained from patient and ER records.     Consults  Subjective:     Principal Problem: Osteoarthritis of left knee    Chief Complaint:  L knee pain        HPI: Pt is a 71 y.o. female s/p L total knee arthroplasty performed earlier today by Dr. Erasmo Rogers. Son is in the room at the time of my exam. She is resting comfortably in bed in no acute distress, eating her dinner. During our encounter, she denies chest pain, palpitations, SOB, fevers, abd pain and n/v/d. She has not yet had a bowel movement. Pt appears to be alert and oriented to time, person and place.     Pt has a hx of HTN, hypothyroidism, gout, morbid obesity, and arthritis. Her PCP is Dr. Charo Blankenship. She is unsure whether she has ever been evaluated for obstructive sleep apnea.     Workup today included a postoperative knee xray which revealed expected postoperative findings from left total knee arthroplasty hardware placement. Hardware was in expected position and expected soft tissue swelling was noted. There is no recent blood work on file; labs are to be drawn in the am.     The hospital medicine service has been consulted for postsurgical management of this patient.           Past Medical History:   Diagnosis Date    Anemia     Dysuria     Encounter for long-term (current) use of other medications     Encounter for screening colonoscopy 05/13/2015    Essential hypertension     Family history of polyps in the colon     Goiter     Gout     H/O bone density study 02/27/2020    History of esophagogastroduodenoscopy (EGD) 01/2020    Hypokalemia     Hypothyroidism     Insomnia     Knee pain     Screening for malignant neoplasm of cervix 01/20/2020     Screening mammogram, encounter for 07/16/2021    Andersons/negative exam/scattered fibroglandular densities/recommended anual screening    Screening mammogram, encounter for 06/11/2020       Past Surgical History:   Procedure Laterality Date    HYSTERECTOMY      TUBAL LIGATION         Review of patient's allergies indicates:  No Known Allergies    No current facility-administered medications on file prior to encounter.     Current Outpatient Medications on File Prior to Encounter   Medication Sig    allopurinoL (ZYLOPRIM) 100 MG tablet Take 2 tablets (200 mg total) by mouth once daily.    montelukast (SINGULAIR) 10 mg tablet Take 1 tablet (10 mg total) by mouth once daily.    naproxen (NAPROSYN) 500 MG tablet Take 1 tablet (500 mg total) by mouth 2 (two) times daily as needed (pain).    albuterol (PROVENTIL/VENTOLIN HFA) 90 mcg/actuation inhaler Inhale 2 puffs into the lungs every 4 (four) hours as needed for Wheezing. Rescue    zolpidem (AMBIEN) 10 mg Tab Take 1 tablet (10 mg total) by mouth nightly as needed (insomnia).     Family History    None       Tobacco Use    Smoking status: Never Smoker    Smokeless tobacco: Never Used   Substance and Sexual Activity    Alcohol use: Never    Drug use: Never    Sexual activity: Not Currently     Review of Systems   Constitutional:  Negative for chills, fatigue and fever.   HENT:  Negative for sore throat.    Eyes:  Negative for visual disturbance.   Respiratory:  Negative for cough and shortness of breath.    Cardiovascular:  Negative for chest pain, palpitations and leg swelling.   Gastrointestinal:  Negative for abdominal pain, constipation, diarrhea, nausea, rectal pain and vomiting.   Genitourinary:  Negative for dysuria and hematuria.   Musculoskeletal:  Positive for arthralgias. Negative for myalgias.   Skin:  Negative for rash.   Neurological:  Negative for dizziness, light-headedness, numbness and headaches.   Objective:     Vital Signs (Most  Recent):  Temp: 97.7 °F (36.5 °C) (07/18/22 1950)  Pulse: 82 (07/18/22 1950)  Resp: 18 (07/18/22 1950)  BP: 138/78 (07/18/22 1950)  SpO2: 99 % (07/18/22 2007) Vital Signs (24h Range):  Temp:  [97.7 °F (36.5 °C)-98.7 °F (37.1 °C)] 97.7 °F (36.5 °C)  Pulse:  [] 82  Resp:  [13-29] 18  SpO2:  [92 %-99 %] 99 %  BP: (119-158)/(70-94) 138/78     Weight: 116.1 kg (256 lb)  Body mass index is 42.6 kg/m².    Physical Exam  Vitals reviewed.   Constitutional:       General: She is not in acute distress.     Appearance: She is obese. She is not ill-appearing, toxic-appearing or diaphoretic.   HENT:      Head: Normocephalic and atraumatic.      Comments: 2L Nasal cannula in place      Right Ear: External ear normal.      Left Ear: External ear normal.      Mouth/Throat:      Mouth: Mucous membranes are moist.   Eyes:      General: No scleral icterus.     Pupils: Pupils are equal, round, and reactive to light.   Cardiovascular:      Rate and Rhythm: Normal rate and regular rhythm.      Heart sounds: Normal heart sounds. No murmur heard.    No friction rub. No gallop.   Pulmonary:      Effort: Pulmonary effort is normal. No respiratory distress.      Breath sounds: Normal breath sounds. No wheezing, rhonchi or rales.   Abdominal:      General: Bowel sounds are normal.      Palpations: Abdomen is soft.      Tenderness: There is no abdominal tenderness. There is no guarding or rebound.      Comments: Midline surgical scar   Musculoskeletal:         General: No swelling.      Right lower leg: Edema present.      Left lower leg: Edema present.      Comments: Cryotherapeutic brace noted to LLE   Skin:     General: Skin is warm and dry.      Coloration: Skin is not jaundiced.      Findings: No erythema or rash.   Neurological:      Mental Status: She is alert and oriented to person, place, and time.       Significant Labs: All pertinent labs within the past 24 hours have been reviewed.    Significant Imaging: I have reviewed all  pertinent imaging results/findings within the past 24 hours.    Assessment/Plan:     * Osteoarthritis of left knee  Pt is POD #0 L total knee arthroplasty   Oxycontin prn   Management per ortho team   Telemetry to monitor for any arrhythmias     Gout  Continue home allopurinol     Essential hypertension    Continue home amlodipine, HCTZ and losartan.   Will continue to monitor vitals     Hypothyroidism  Continue home levothyroxine       Bilateral lower extremity edema  No recent labs or ECHO on file  Pt counseled at length to f/u with PCP in order to determine etiology of BLE edema       Morbid obesity  Pt does not recall undergoing sleep study or formal ELMA dx   CPAP nevertheless ordered in the setting of morbid obesity and anaesthetic administration       VTE Risk Mitigation (From admission, onward)         Ordered     IP VTE LOW RISK PATIENT  Once         07/18/22 1842     Place ALEXANDR hose  Until discontinued         07/18/22 1842     Place sequential compression device  Until discontinued         07/18/22 1842                    Thank you for your consult. I will follow-up with patient. Please contact us if you have any additional questions.    Nabil Pierce, DO  Department of Hospital Medicine   [facility] - Orthopedic

## 2022-07-19 NOTE — PLAN OF CARE
[facility] - Orthopedic  Initial Discharge Assessment       Primary Care Provider: Charo Blankenship MD    Admission Diagnosis: Primary osteoarthritis of left knee [M17.12]  Osteoarthritis of left knee [M17.12]    Admission Date: 7/18/2022  Expected Discharge Date: 7/19/2022    Discharge Barriers Identified: None    Payor: MEDICARE / Plan: MEDICARE PART A & B / Product Type: Government /     Extended Emergency Contact Information  Primary Emergency Contact: SHIRLEY JACKSON  Mobile Phone: [phone]  Relation: Daughter  Preferred language: English   needed? No    Discharge Plan A: Other ([facility])  Discharge Plan B: Home Health ([facility])      [facility] [address] [place] [address], [place] - [address]  [address]  [facility] [address]  Phone: [phone] Fax: [phone]    [facility], [place] - [address]  [address]  [place] [address]  Phone: [phone] Fax: [phone]      Initial Assessment (most recent)     Adult Discharge Assessment - 07/19/22 1020        Discharge Assessment    Assessment Type Discharge Planning Assessment     Source of Information patient     Lives With child(mehreen), adult     Do you expect to return to your current living situation? Yes     Do you have help at home or someone to help you manage your care at home? Yes     Who are your caregiver(s) and their phone number(s)? Shirley Jackson- Daughter [phone]     Prior to hospitilization cognitive status: Alert/Oriented     Current cognitive status: Alert/Oriented     Home Accessibility stairs to enter home;stairs within home     Number of Stairs, Within Home, Primary none     Number of Stairs, Main Entrance none     Equipment Currently Used at Home bedside commode;walker, standard     Patient currently being followed by outpatient case management? No     Do you currently have service(s) that help you manage your care at home? No     Do you take prescription medications? Yes     Do you  have prescription coverage? Yes     Coverage Medicare     Do you have any problems affording any of your prescribed medications? No     Is the patient taking medications as prescribed? yes     How do you get to doctors appointments? car, drives self     Are you on dialysis? No     Do you take coumadin? No     Discharge Plan A Other   [facility]    Discharge Plan B Home Health   [facility]    DME Needed Upon Discharge  none     Discharge Plan discussed with: Patient     Discharge Barriers Identified None               Pt lives at home with son, not current with hh and uses a walker and bsc. Pt plans to dc to [facility] for inpt rehab and wants hh set up after rehab, choice obtained. Referral faxed to [facility] and initial hh referral faxed to [facility]. SW will cont to follow for dc needs.

## 2022-07-19 NOTE — DISCHARGE SUMMARY
[facility] - Orthopedic  Discharge Summary      Admit Date: 7/18/2022    Discharge Date and Time:  07/19/2022 3:17 PM    Attending Physician: Erasmo Rogers MD     Reason for Admission: s/p L TKA    Procedures Performed: Procedure(s) (LRB):  ARTHROPLASTY, KNEE, TOTAL, USING COMPUTER-ASSISTED NAVIGATION (Left)    Hospital Course (synopsis of major diagnoses, care, treatment, and services provided during the course of the hospital stay):  Admitted following uncomplicated left total knee arthroplasty.  Patient participate in physical therapy on postoperative day 1.  Patient was felt to be suitable for discharge to swing bed the following day.      Goals of Care Treatment Preferences:  Code Status: Full Code      Significant Diagnostic Studies: Labs: All labs within the past 24 hours have been reviewed    Final Diagnoses:    Principal Problem: Osteoarthritis of left knee   Secondary Diagnoses:   Active Hospital Problems    Diagnosis  POA    *Osteoarthritis of left knee [M17.12]  Yes     Chronic    Gout [M10.9]  Yes    Morbid obesity [E66.01]  Yes    Bilateral lower extremity edema [R60.0]  Yes    Essential hypertension [I10]  Yes    Hypothyroidism [E03.9]  Yes     Chronic      Resolved Hospital Problems   No resolved problems to display.       Discharged Condition: good    Disposition: Home or Self Care    Follow Up/Patient Instructions:     Medications:  Reconciled Home Medications:      Medication List      START taking these medications    aspirin 325 MG tablet  Take 1 tablet (325 mg total) by mouth once daily.     ondansetron 4 MG Tbdl  Commonly known as: ZOFRAN-ODT  Take 2 tablets (8 mg total) by mouth every 8 (eight) hours as needed (Nausea).     oxyCODONE-acetaminophen  mg per tablet  Commonly known as: PERCOCET  Take 1 tablet by mouth every 6 (six) hours as needed for Pain.     senna-docusate 8.6-50 mg 8.6-50 mg per tablet  Commonly known as: PERICOLACE  Take 1 tablet by mouth 2 (two) times  daily.        CONTINUE taking these medications    albuterol 90 mcg/actuation inhaler  Commonly known as: PROVENTIL/VENTOLIN HFA  Inhale 2 puffs into the lungs every 4 (four) hours as needed for Wheezing. Rescue     allopurinoL 100 MG tablet  Commonly known as: ZYLOPRIM  Take 2 tablets (200 mg total) by mouth once daily.     amLODIPine 5 MG tablet  Commonly known as: NORVASC  Take 1 tablet (5 mg total) by mouth once daily.     hydroCHLOROthiazide 25 MG tablet  Commonly known as: HYDRODIURIL  Take 1 tablet (25 mg total) by mouth once daily.     levothyroxine 75 MCG tablet  Commonly known as: EUTHYROX  Take 1 tablet (75 mcg total) by mouth once daily.     losartan 100 MG tablet  Commonly known as: COZAAR  Take 1 tablet (100 mg total) by mouth once daily.     montelukast 10 mg tablet  Commonly known as: SINGULAIR  Take 1 tablet (10 mg total) by mouth once daily.     naproxen 500 MG tablet  Commonly known as: NAPROSYN  Take 1 tablet (500 mg total) by mouth 2 (two) times daily as needed (pain).     potassium chloride 10 MEQ Tbsr  Commonly known as: KLOR-CON  Take 2 tablets (20 mEq total) by mouth 2 (two) times daily.     zolpidem 10 mg Tab  Commonly known as: AMBIEN  Take 1 tablet (10 mg total) by mouth nightly as needed (insomnia).          Discharge Procedure Orders   Ambulatory referral/consult to Home Health   Standing Status: Future   Referral Priority: Routine Referral Type: Home Health Care   Referral Reason: Specialty Services Required   Requested Specialty: Home Health Services   Number of Visits Requested: 1     Diet general     Remove dressing in 48 hours     Change dressing (specify)   Order Comments: Dressing change:  On POD 3- recommend dressing change via Home Health     Call MD for:  temperature >100.4     Call MD for:  persistent nausea and vomiting     Call MD for:  severe uncontrolled pain     Call MD for:  difficulty breathing, headache or visual disturbances     Call MD for:  redness, tenderness, or  signs of infection (pain, swelling, redness, odor or green/yellow discharge around incision site)     Call MD for:  hives     Call MD for:  persistent dizziness or light-headedness     Call MD for:  extreme fatigue      Contact information for follow-up providers     PATRICIA Llamas Follow up in 13 day(s).    Specialties: Family Medicine, Emergency Medicine, Orthopedic Surgery  Why: please follow up on August 1 at 8:30  Contact information:  [address] [facility] [address]  [phone]                   Contact information for after-discharge care     [facility] [place] .    Service: Inpatient Rehabilitation  Contact information:  [address] [facility] [address]  [phone]                 Home Medical Care     [facility] .    Service: Home Health Services  Contact information:  [address] [facility] [address]  [phone]

## 2022-07-19 NOTE — PT/OT/SLP EVAL
"Occupational Therapy   Evaluation    Name: Janay eRveles  MRN: [id number]  Admitting Diagnosis:  Osteoarthritis of left knee  Recent Surgery: Procedure(s) (LRB):  ARTHROPLASTY, KNEE, TOTAL, USING COMPUTER-ASSISTED NAVIGATION (Left) 1 Day Post-Op    Recommendations:     Discharge Recommendations: rehabilitation facility, nursing facility, skilled  Discharge Equipment Recommendations:  walker, rolling  Barriers to discharge:  None    Assessment:     Janay Reveles is a 71 y.o. female with a medical diagnosis of Osteoarthritis of left knee.  She presents with alert, minimal c/o discomfort in (L) LE. Performance deficits affecting function: weakness, impaired endurance, impaired functional mobility, impaired self care skills, edema, decreased ROM, orthopedic precautions.      Rehab Prognosis: Good; patient would benefit from acute skilled OT services to address these deficits and reach maximum level of function.       Plan:     Patient to be seen 5 x/week to address the above listed problems via self-care/home management, therapeutic activities, therapeutic exercises  · Plan of Care Expires: 07/26/22  · Plan of Care Reviewed with: patient    Subjective     Chief Complaint: Pt is day 1 post op (L) TKR  Patient/Family Comments/goals: Pt wants to go to inpt rehab or swing bed at D/C    Occupational Profile:  Living Environment: Pt lives at home with son who is"in and out"   Previous level of function: Pt was independent with her mobility, self-care and IADLs.  Roles and Routines: Pt takes care of herself and her home  Equipment Used at Home:  bedside commode, walker, standard  Assistance upon Discharge: Pt will have assistance from facility staff    Pain/Comfort:  · Pain Rating 1: 2/10  · Location - Side 1: Right  · Location 1: knee  · Pain Addressed 1: Pre-medicate for activity, Reposition, Distraction, Cessation of Activity  · Pain Rating Post-Intervention 1: 2/10    Patients cultural, spiritual, Buddhist conflicts " given the current situation: no    Objective:     Communicated with: CAROLINE Zepeda prior to session.  Patient found up in chair with peripheral IV, blood pressure cuff, SCD, wound vac upon OT entry to room.    General Precautions: Standard, fall   Orthopedic Precautions:LLE weight bearing as tolerated   Braces: Knee immobilizer  Respiratory Status: Room air    Occupational Performance:    Bed Mobility:    · NT    Functional Mobility/Transfers:  · Patient completed Sit <> Stand Transfer with stand by assistance and contact guard assistance  with  rolling walker   · Patient completed Toilet Transfer Step Transfer technique with stand by assistance and contact guard assistance with  rolling walker  · Functional Mobility: Pt is very cautious, takes slow, small steps    Activities of Daily Living:  · Feeding:  independence    · Bathing: Pt bathed with setup from chair for upperbody anterior, min(A) with LE, stood with RW but required  max(A) with bathing perineum while stading. Pt did wash indpendently on toilet.    · Upper Body Dressing: stand by assistance hospital gown  · Lower Body Dressing: moderate assistance    · Toileting: stand by assistance for clothes management and hygiene    Cognitive/Visual Perceptual:  Cognitive/Psychosocial Skills:     -       Oriented to: Person, Place and Situation   -       Follows Commands/attention:Follows one-step commands and Follows two-step commands  -       Communication: clear/fluent  -       Safety awareness/insight to disability: intact   -       Mood/Affect/Coping skills/emotional control: Appropriate to situation, Cooperative and Pleasant    Physical Exam:  Sensation:    -       Intact  Motor Planning:    -       intact  Upper Extremity Range of Motion:     -       Right Upper Extremity: WFL  -       Left Upper Extremity: WFL  Upper Extremity Strength:    -       Right Upper Extremity: WFL  -       Left Upper Extremity: WFL   Strength:    -       Right Upper  Extremity: WFL  -       Left Upper Extremity: WFL  Fine Motor Coordination:    -       Intact  Gross motor coordination:   WFL    AMPAC 6 Click ADL:  AMPAC Total Score: 21    Treatment & Education:  · Pt educated on OT role/POC.   · Importance of OOB activity with staff assistance.  · Importance of sitting up in the chair throughout the day as tolerated, especially for meals   · Safety during functional t/f and mobility with use of RW  · Importance of assisting with self-care activities   · All questions/concerns answered within OT scope of practice    Education:    Patient left up in chair with all lines intact, call button in reach and RN Eva notified    GOALS:   Multidisciplinary Problems     Occupational Therapy Goals        Problem: Occupational Therapy    Goal Priority Disciplines Outcome Interventions   Occupational Therapy Goal     OT, PT/OT Ongoing, Progressing    Description: ST.Pt will perform bathing with setup and SBA  2.Pt will perform UE dressing with setup  3.Pt will perform LE dressing with setup and SNA with AD if needed  4.Pt will transfer bed/chair/bsc with SBA with RW  5.Pt will perform standing task x 4 min with SBA with RW  6.Tolerate 20 min of tx without fatigue.      LTG:   Restore to max I with selfcare and mobility.                      History:     Past Medical History:   Diagnosis Date    Anemia     Encounter for long-term (current) use of other medications     Encounter for screening colonoscopy 2015    Essential hypertension     Goiter     Gout     H/O bone density study 2020    History of esophagogastroduodenoscopy (EGD) 2020    Hypokalemia     Hypothyroidism     Insomnia     Screening for malignant neoplasm of cervix 2020    Screening mammogram, encounter for 2021    [facility]ers[facility]/negative exam/scattered fibroglandular densities/recommended anual screening    Screening mammogram, encounter for 2020       Past Surgical History:    Procedure Laterality Date    HYSTERECTOMY      TUBAL LIGATION         Time Tracking:     OT Date of Treatment: 07/19/22  OT Start Time: 0934  OT Stop Time: 1022  OT Total Time (min): 48 min    Billable Minutes:Evaluation low complexity    7/19/2022

## 2022-07-19 NOTE — PT/OT/SLP EVAL
"Physical Therapy Evaluation    Patient Name:  Janay Reveles   MRN:  [id number]    Recommendations:     Discharge Recommendations:  rehabilitation facility, nursing facility, skilled   Discharge Equipment Recommendations: walker, rolling   Barriers to discharge: awaiting swing bed    Assessment:     Janay Reveles is a 71 y.o. female admitted with a medical diagnosis of Osteoarthritis of left knee.  She presents with the following impairments/functional limitations:  weakness, impaired endurance, impaired self care skills, impaired functional mobility, gait instability, impaired balance, decreased lower extremity function, pain, decreased ROM, edema, orthopedic precautions .      Patient participated well with PT interventions and is motivated to regain full independent mobility. Patient will benefit from swing bed to optimize TKR rehab/recovery    Rehab Prognosis: Good; patient would benefit from acute skilled PT services to address these deficits and reach maximum level of function.    Recent Surgery: Procedure(s) (LRB):  ARTHROPLASTY, KNEE, TOTAL, USING COMPUTER-ASSISTED NAVIGATION (Left) 1 Day Post-Op    Plan:     During this hospitalization, patient to be seen BID (5x/week; daily 2x/week) to address the identified rehab impairments via gait training, therapeutic activities, therapeutic exercises and progress toward the following goals:    · Plan of Care Expires:  08/19/22    Subjective     Chief Complaint: left TKR  Patient/Family Comments/goals: "I think I want swing bed to really work this knee"  Pain/Comfort:  · Pain Rating 1: 2/10 (up to 6/10 during knee flexion stretch)  · Location - Side 1: Left  · Location 1: knee  · Pain Addressed 1: Pre-medicate for activity, Reposition, Distraction, Cessation of Activity  · Pain Rating Post-Intervention 1: 2/10    Patients cultural, spiritual, Mosque conflicts given the current situation: no    Living Environment:  Pt lives with son in a single level home; no " steps to enter  Prior to admission, patients level of function was independent.  Equipment used at home: walker, standard, bedside commode.  DME owned (not currently used): none.  Upon discharge, patient will have assistance from swing bed staff.    Objective:     Communicated with CAROLINE Zepeda prior to session.  Patient found supine with peripheral IV, blood pressure cuff, SCD, wound vac  upon PT entry to room.    General Precautions: Standard, fall   Orthopedic Precautions:LLE weight bearing as tolerated   Braces: Knee immobilizer (to wear at night)  Respiratory Status: Nasal cannula, flow 2 L/min    Exams:  · Cognitive Exam:  Patient is oriented to Person, Place, Time and Situation  · Sensation:    · -       Intact  · Skin Integrity/Edema:      · -       Skin integrity: BRODIE wound vac over incision  · RLE ROM: WFL  · RLE Strength: WFL  · LLE ROM: Deficits: hip WFL; knee 0-60; ankle WFL  · LLE Strength: Deficits: hip WFL; knee 3-/5; ankle WFL    Functional Mobility:  · Bed Mobility:     · Supine to Sit: minimum assistance  · Transfers:     · Sit to Stand:  minimum assistance with rolling walker  · Gait: 40' with RW CGA and verbal cues for sequencing/safety; step to pattern, easy fatigue    Therapeutic Activities and Exercises:  ·  Pt educated on PT role/POC.   · Importance of OOB activity with staff assistance.  · Importance of sitting up in the chair throughout the day as tolerated, especially for meals   · Safety during functional t/f and mobility with use of RW  · White board updated   · Multiple self-care tasks/functional mobility completed- assistance level noted above   · All questions/concerns answered within PT scope of practice    CPM 0-50 x 1 hour  LE ex x30 reps      AM-PAC 6 CLICK MOBILITY  Total Score:16     Patient left up in chair with all lines intact, call button in reach and CAROLINE Zepeda notified.    GOALS:   Multidisciplinary Problems     Physical Therapy Goals        Problem: Physical  Therapy    Goal Priority Disciplines Outcome Goal Variances Interventions   Physical Therapy Goal     PT, PT/OT Ongoing, Progressing     Description: Short Term Goals to be met by: 2022    Patient will increase functional independence with mobility by performin. Supine to sit with Modified [place]  2. Sit to stand transfer with Modified [place]  3. Bed to chair transfer with Modified [place] using Rolling Walker, WBAT left LE  4. Gait  x 100 feet with Modified [place] using Rolling Walker, WBAT left LE.   5. Lower extremity exercise program x30 reps per handout, with assistance as needed  6. Knee ROM 0-90    Long Term Goals to be met by: 2022    Pt will regain full independent functional mobility to return to prior activities of daily living.                    History:     Past Medical History:   Diagnosis Date    Anemia     Encounter for long-term (current) use of other medications     Encounter for screening colonoscopy 2015    Essential hypertension     Goiter     Gout     H/O bone density study 2020    History of esophagogastroduodenoscopy (EGD) 2020    Hypokalemia     Hypothyroidism     Insomnia     Screening for malignant neoplasm of cervix 2020    Screening mammogram, encounter for 2021    [facility]ersons/negative exam/scattered fibroglandular densities/recommended anual screening    Screening mammogram, encounter for 2020       Past Surgical History:   Procedure Laterality Date    HYSTERECTOMY      TUBAL LIGATION         Time Tracking:     PT Received On: 22  PT Start Time: 831     PT Stop Time: 903  PT Total Time (min): 32 min     Billable Minutes: Evaluation Low complexity      2022

## 2022-07-19 NOTE — HPI
Pt is a 71 y.o. female s/p L total knee arthroplasty performed earlier today by Dr. Erasmo Rogers. Son is in the room at the time of my exam. She is resting comfortably in bed in no acute distress, eating her dinner. During our encounter, she denies chest pain, palpitations, SOB, fevers, abd pain and n/v/d. She has not yet had a bowel movement. Pt appears to be alert and oriented to time, person and place.     Pt has a hx of HTN, hypothyroidism, gout, morbid obesity, and arthritis. Her PCP is Dr. Charo Blankenship. She is unsure whether she has ever been evaluated for obstructive sleep apnea. She does not use oxygen at home.     Workup today included a postoperative knee xray which revealed expected postoperative findings from left total knee arthroplasty hardware placement. Hardware was in expected position and expected soft tissue swelling was noted.     The hospital medicine service has been consulted for postsurgical management of this patient.

## 2022-07-19 NOTE — ASSESSMENT & PLAN NOTE
Pt does not recall undergoing or formal ELMA dx   CPAP nevertheless ordered in the setting of morbid obesity and anaesthetic administration

## 2022-07-19 NOTE — ASSESSMENT & PLAN NOTE
Pt is POD #0 L total knee arthroplasty   Oxycontin prn   Management per ortho team   Telemetry to monitor for any arrhythmias

## 2022-07-19 NOTE — PLAN OF CARE
Problem: Occupational Therapy  Goal: Occupational Therapy Goal  Description: ST.Pt will perform bathing with setup and SBA  2.Pt will perform UE dressing with setup  3.Pt will perform LE dressing with setup and SNA with AD if needed  4.Pt will transfer bed/chair/bsc with SBA with RW  5.Pt will perform standing task x 4 min with SBA with RW  6.Tolerate 20 min of tx without fatigue.      LTG:   Restore to max I with selfcare and mobility.     Outcome: Ongoing, Progressing   OT low complexity evaluation performed. Pt would benefit from inpt rehab or swingbed prior to returning home to increase safety and efficiency with ADLs and functional mobility.

## 2022-07-19 NOTE — ASSESSMENT & PLAN NOTE
No recent labs or ECHO on file  Pt counseled at length to f/u with PCP in order to determine etiology of BLE edema

## 2022-07-20 NOTE — HOSPITAL COURSE
7/19: labs and vitals appear relatively stable today. Hyperglycemia - diabetes work-up as outpatient.  LE edema - work-up based on medications  (on amlodipine) versus cardiac or other work-up.  Follow-up these issues with PCP.

## 2022-07-20 NOTE — SUBJECTIVE & OBJECTIVE
Interval History:     Review of Systems   Constitutional:  Negative for chills, fatigue and fever.   HENT:  Negative for sore throat.    Eyes:  Negative for visual disturbance.   Respiratory:  Negative for cough and shortness of breath.    Cardiovascular:  Negative for chest pain, palpitations and leg swelling.   Gastrointestinal:  Negative for abdominal pain, constipation, diarrhea, nausea, rectal pain and vomiting.   Genitourinary:  Negative for dysuria and hematuria.   Musculoskeletal:  Positive for arthralgias. Negative for myalgias.   Skin:  Negative for rash.   Neurological:  Negative for dizziness, light-headedness, numbness and headaches.   Objective:     Vital Signs (Most Recent):  Temp: 97.6 °F (36.4 °C) (07/19/22 1100)  Pulse: 65 (07/19/22 1100)  Resp: 20 (07/19/22 1228)  BP: 123/69 (07/19/22 1100)  SpO2: 99 % (07/19/22 1100) Vital Signs (24h Range):  Temp:  [97.6 °F (36.4 °C)-98.6 °F (37 °C)] 97.6 °F (36.4 °C)  Pulse:  [65-82] 65  Resp:  [15-20] 20  SpO2:  [99 %-100 %] 99 %  BP: (122-148)/(69-88) 123/69     Weight: 116.1 kg (256 lb)  Body mass index is 42.6 kg/m².    Intake/Output Summary (Last 24 hours) at 7/19/2022 2133  Last data filed at 7/19/2022 1530  Gross per 24 hour   Intake 720 ml   Output 600 ml   Net 120 ml      Physical Exam  Vitals reviewed.   Constitutional:       General: She is not in acute distress.     Appearance: She is obese. She is not ill-appearing, toxic-appearing or diaphoretic.   HENT:      Head: Normocephalic and atraumatic.      Comments: 2L Nasal cannula in place      Right Ear: External ear normal.      Left Ear: External ear normal.      Mouth/Throat:      Mouth: Mucous membranes are moist.   Eyes:      General: No scleral icterus.     Pupils: Pupils are equal, round, and reactive to light.   Cardiovascular:      Rate and Rhythm: Normal rate and regular rhythm.      Heart sounds: Normal heart sounds. No murmur heard.    No friction rub. No gallop.   Pulmonary:      Effort:  Pulmonary effort is normal. No respiratory distress.      Breath sounds: Normal breath sounds. No wheezing, rhonchi or rales.   Abdominal:      General: Bowel sounds are normal.      Palpations: Abdomen is soft.      Tenderness: There is no abdominal tenderness. There is no guarding or rebound.      Comments: Midline surgical scar   Musculoskeletal:         General: No swelling.      Right lower leg: Edema present.      Left lower leg: Edema present.      Comments: Cryotherapeutic brace noted to LLE   Skin:     General: Skin is warm and dry.      Coloration: Skin is not jaundiced.      Findings: No erythema or rash.   Neurological:      Mental Status: She is alert and oriented to person, place, and time.       Significant Labs: All pertinent labs within the past 24 hours have been reviewed.    Significant Imaging: I have reviewed all pertinent imaging results/findings within the past 24 hours.

## 2022-07-20 NOTE — PROGRESS NOTES
[facility]  Progress Note    Patient Name: Janay Reveles  MRN: [id number]  Patient Class: OP- Outpatient Recovery   Admission Date: 7/18/2022  Length of Stay: 0 days  Attending Physician: No att. providers found  Primary Care Provider: Charo Blankenship MD        Subjective:     Principal Problem:Osteoarthritis of left knee    HPI:  Pt is a 71 y.o. female s/p L total knee arthroplasty performed earlier today by Dr. Erasmo Rogers. Son is in the room at the time of my exam. She is resting comfortably in bed in no acute distress, eating her dinner. During our encounter, she denies chest pain, palpitations, SOB, fevers, abd pain and n/v/d. She has not yet had a bowel movement. Pt appears to be alert and oriented to time, person and place.     Pt has a hx of HTN, hypothyroidism, gout, morbid obesity, and arthritis. Her PCP is Dr. Charo Blankenship. She is unsure whether she has ever been evaluated for obstructive sleep apnea. She does not use oxygen at home.     Workup today included a postoperative knee xray which revealed expected postoperative findings from left total knee arthroplasty hardware placement. Hardware was in expected position and expected soft tissue swelling was noted.     The hospital medicine service has been consulted for postsurgical management of this patient.           Overview/Hospital Course:  7/19: labs and vitals appear relatively stable today. Hyperglycemia - diabetes work-up as outpatient.  LE edema - work-up based on medications  (on amlodipine) versus cardiac or other work-up.  Follow-up these issues with PCP.       Interval History:     Review of Systems   Constitutional:  Negative for chills, fatigue and fever.   HENT:  Negative for sore throat.    Eyes:  Negative for visual disturbance.   Respiratory:  Negative for cough and shortness of breath.    Cardiovascular:  Negative for chest pain, palpitations and leg swelling.   Gastrointestinal:  Negative for  abdominal pain, constipation, diarrhea, nausea, rectal pain and vomiting.   Genitourinary:  Negative for dysuria and hematuria.   Musculoskeletal:  Positive for arthralgias. Negative for myalgias.   Skin:  Negative for rash.   Neurological:  Negative for dizziness, light-headedness, numbness and headaches.   Objective:     Vital Signs (Most Recent):  Temp: 97.6 °F (36.4 °C) (07/19/22 1100)  Pulse: 65 (07/19/22 1100)  Resp: 20 (07/19/22 1228)  BP: 123/69 (07/19/22 1100)  SpO2: 99 % (07/19/22 1100) Vital Signs (24h Range):  Temp:  [97.6 °F (36.4 °C)-98.6 °F (37 °C)] 97.6 °F (36.4 °C)  Pulse:  [65-82] 65  Resp:  [15-20] 20  SpO2:  [99 %-100 %] 99 %  BP: (122-148)/(69-88) 123/69     Weight: 116.1 kg (256 lb)  Body mass index is 42.6 kg/m².    Intake/Output Summary (Last 24 hours) at 7/19/2022 2133  Last data filed at 7/19/2022 1530  Gross per 24 hour   Intake 720 ml   Output 600 ml   Net 120 ml      Physical Exam  Vitals reviewed.   Constitutional:       General: She is not in acute distress.     Appearance: She is obese. She is not ill-appearing, toxic-appearing or diaphoretic.   HENT:      Head: Normocephalic and atraumatic.      Comments: 2L Nasal cannula in place      Right Ear: External ear normal.      Left Ear: External ear normal.      Mouth/Throat:      Mouth: Mucous membranes are moist.   Eyes:      General: No scleral icterus.     Pupils: Pupils are equal, round, and reactive to light.   Cardiovascular:      Rate and Rhythm: Normal rate and regular rhythm.      Heart sounds: Normal heart sounds. No murmur heard.    No friction rub. No gallop.   Pulmonary:      Effort: Pulmonary effort is normal. No respiratory distress.      Breath sounds: Normal breath sounds. No wheezing, rhonchi or rales.   Abdominal:      General: Bowel sounds are normal.      Palpations: Abdomen is soft.      Tenderness: There is no abdominal tenderness. There is no guarding or rebound.      Comments: Midline surgical scar    Musculoskeletal:         General: No swelling.      Right lower leg: Edema present.      Left lower leg: Edema present.      Comments: Cryotherapeutic brace noted to LLE   Skin:     General: Skin is warm and dry.      Coloration: Skin is not jaundiced.      Findings: No erythema or rash.   Neurological:      Mental Status: She is alert and oriented to person, place, and time.       Significant Labs: All pertinent labs within the past 24 hours have been reviewed.    Significant Imaging: I have reviewed all pertinent imaging results/findings within the past 24 hours.      Assessment/Plan:      * Osteoarthritis of left knee  Pt is POD #0 L total knee arthroplasty   Oxycontin prn   Management per ortho team   Telemetry to monitor for any arrhythmias     Hyperglycemia  Check A1c with PCP as outpatient.       Bilateral lower extremity edema  No recent labs or ECHO on file  Pt counseled at length to f/u with PCP in order to determine etiology of BLE edema       Morbid obesity  Pt does not recall undergoing or formal ELMA dx   CPAP nevertheless ordered in the setting of morbid obesity and anaesthetic administration     Gout  Continue home allopurinol       Essential hypertension    Continue home amlodipine, HCTZ and losartan.   Will continue to monitor vitals     Hypothyroidism  Continue home levothyroxine         VTE Risk Mitigation (From admission, onward)         Ordered     IP VTE LOW RISK PATIENT  Once         07/19/22 0729                Discharge Planning   CANDY: 7/19/2022     Code Status: Prior   Is the patient medically ready for discharge?:     Reason for patient still in hospital (select all that apply): Treatment  Discharge Plan A: Other (Kulwinder simmons)   Discharge Delays: None known at this time              Rito Owen MD  Department of Hospital Medicine   [facility], [place] - Orthopedic

## 2022-07-29 ENCOUNTER — OUTSIDE PLACE OF SERVICE (OUTPATIENT)
Dept: ORTHOPEDICS | Facility: CLINIC | Age: 72
End: 2022-07-29
Payer: MEDICARE

## 2022-07-30 ENCOUNTER — OUTSIDE PLACE OF SERVICE (OUTPATIENT)
Dept: ORTHOPEDICS | Facility: CLINIC | Age: 72
End: 2022-07-30
Payer: MEDICARE

## 2022-07-31 ENCOUNTER — EXTERNAL CHRONIC CARE MANAGEMENT (OUTPATIENT)
Dept: FAMILY MEDICINE | Facility: CLINIC | Age: 72
End: 2022-07-31
Payer: MEDICARE

## 2022-07-31 PROCEDURE — G0511 CCM/BHI BY RHC/FQHC 20MIN MO: HCPCS | Mod: ,,, | Performed by: FAMILY MEDICINE

## 2022-07-31 PROCEDURE — G0511 PR CHRONIC CARE MGMT, RHC OR FQHC ONLY, 20 MINS OR MORE: ICD-10-PCS | Mod: ,,, | Performed by: FAMILY MEDICINE

## 2022-08-01 ENCOUNTER — OFFICE VISIT (OUTPATIENT)
Dept: ORTHOPEDICS | Facility: CLINIC | Age: 72
End: 2022-08-01
Payer: MEDICARE

## 2022-08-01 DIAGNOSIS — Z96.652 S/P TKR (TOTAL KNEE REPLACEMENT), LEFT: Primary | ICD-10-CM

## 2022-08-01 PROCEDURE — 99024 PR POST-OP FOLLOW-UP VISIT: ICD-10-PCS | Mod: ,,, | Performed by: NURSE PRACTITIONER

## 2022-08-01 PROCEDURE — 99024 POSTOP FOLLOW-UP VISIT: CPT | Mod: ,,, | Performed by: NURSE PRACTITIONER

## 2022-08-01 RX ORDER — OXYCODONE AND ACETAMINOPHEN 10; 325 MG/1; MG/1
1 TABLET ORAL EVERY 6 HOURS PRN
Qty: 30 TABLET | Refills: 0 | Status: SHIPPED | OUTPATIENT
Start: 2022-08-01 | End: 2022-08-04 | Stop reason: SDUPTHER

## 2022-08-01 NOTE — PROGRESS NOTES
Post-Operative Total Knee        No surgery found No surgery found      Pt is here today for First post-operative followup of her No surgery found.  she is doing well.  We have reviewed her findings and discussed plan of care and future treatment options, including the physical therapy plan.      Pt is doing well since surgery, staples were removed on today and her incision looks good, she had a little drainage when staples was removed which seems to have stopped when steri strips was applied. We will set up her OP PT. Protocol discussed with Pt.     Physical Exam:     The affected knee was inspected today.   The wound is healing well with no signs of erythema or warmth.  There is no drainage.  No clinical signs or symptoms of infection are present.   ROM: 3-95  -Benjamín's sign.  2+ pulses are present.         Assessment and Plan  1. S/P TKR (total knee replacement), left  - Ambulatory referral/consult to Physical/Occupational Therapy; Future  - oxyCODONE-acetaminophen (PERCOCET)  mg per tablet; Take 1 tablet by mouth every 6 (six) hours as needed for Pain.  Dispense: 30 tablet; Refill: 0            We will continue post-operative physical therapy until the patient feels that they are independent with a home rehab program.  Return to work status/ return to activities status was discussed today in detail. All questions were answered.    The use of stockings and DVT prophylaxis was discussed.  Will follow up in additional 4-6 weeks with radiographs at that time.   Percocet refilled  There are no Patient Instructions on file for this visit.

## 2022-08-04 DIAGNOSIS — Z96.652 S/P TKR (TOTAL KNEE REPLACEMENT), LEFT: ICD-10-CM

## 2022-08-04 LAB — BCS RECOMMENDATION EXT: NORMAL

## 2022-08-04 RX ORDER — OXYCODONE AND ACETAMINOPHEN 10; 325 MG/1; MG/1
1 TABLET ORAL EVERY 6 HOURS PRN
Qty: 30 TABLET | Refills: 0 | Status: SHIPPED | OUTPATIENT
Start: 2022-08-04 | End: 2023-04-13

## 2022-08-11 ENCOUNTER — OUTSIDE PLACE OF SERVICE (OUTPATIENT)
Dept: ADMINISTRATIVE | Facility: HOSPITAL | Age: 72
End: 2022-08-11
Payer: MEDICARE

## 2022-08-29 ENCOUNTER — TELEPHONE (OUTPATIENT)
Dept: ORTHOPEDICS | Facility: CLINIC | Age: 72
End: 2022-08-29
Payer: MEDICARE

## 2022-08-29 NOTE — TELEPHONE ENCOUNTER
Patient want to see if it would be okay to take her naproxen for pain since she was out of her oxycodone. I told her I thought that would be fine, but I would check with the doctor to be sure.

## 2022-08-30 DIAGNOSIS — Z96.652 S/P TKR (TOTAL KNEE REPLACEMENT), LEFT: Primary | ICD-10-CM

## 2022-08-31 ENCOUNTER — EXTERNAL CHRONIC CARE MANAGEMENT (OUTPATIENT)
Dept: FAMILY MEDICINE | Facility: CLINIC | Age: 72
End: 2022-08-31
Payer: MEDICARE

## 2022-08-31 PROCEDURE — G0511 CCM/BHI BY RHC/FQHC 20MIN MO: HCPCS | Mod: ,,, | Performed by: FAMILY MEDICINE

## 2022-08-31 PROCEDURE — G0511 PR CHRONIC CARE MGMT, RHC OR FQHC ONLY, 20 MINS OR MORE: ICD-10-PCS | Mod: ,,, | Performed by: FAMILY MEDICINE

## 2022-09-01 ENCOUNTER — HOSPITAL ENCOUNTER (OUTPATIENT)
Dept: RADIOLOGY | Facility: HOSPITAL | Age: 72
Discharge: HOME | End: 2022-09-01
Attending: ORTHOPAEDIC SURGERY
Payer: MEDICARE

## 2022-09-01 ENCOUNTER — OFFICE VISIT (OUTPATIENT)
Dept: ORTHOPEDICS | Facility: CLINIC | Age: 72
End: 2022-09-01
Payer: MEDICARE

## 2022-09-01 DIAGNOSIS — Z96.652 S/P TKR (TOTAL KNEE REPLACEMENT), LEFT: Primary | ICD-10-CM

## 2022-09-01 DIAGNOSIS — Z96.652 S/P TKR (TOTAL KNEE REPLACEMENT), LEFT: ICD-10-CM

## 2022-09-01 PROCEDURE — 73562 X-RAY EXAM OF KNEE 3: CPT | Mod: 26,LT,, | Performed by: ORTHOPAEDIC SURGERY

## 2022-09-01 PROCEDURE — 99024 POSTOP FOLLOW-UP VISIT: CPT | Mod: ,,, | Performed by: ORTHOPAEDIC SURGERY

## 2022-09-01 PROCEDURE — 99024 PR POST-OP FOLLOW-UP VISIT: ICD-10-PCS | Mod: ,,, | Performed by: ORTHOPAEDIC SURGERY

## 2022-09-01 PROCEDURE — 73562 X-RAY EXAM OF KNEE 3: CPT | Mod: TC,LT

## 2022-09-01 PROCEDURE — 73562 XR KNEE AP LAT WITH SUNRISE LEFT: ICD-10-PCS | Mod: 26,LT,, | Performed by: ORTHOPAEDIC SURGERY

## 2022-09-01 RX ORDER — CELECOXIB 100 MG/1
100 CAPSULE ORAL 2 TIMES DAILY
Qty: 60 CAPSULE | Refills: 1 | Status: SHIPPED | OUTPATIENT
Start: 2022-09-01 | End: 2022-10-13

## 2022-09-01 NOTE — PROGRESS NOTES
Post-Operative Total Knee        Arthroplasty, Knee, Total, Using Computer-assisted Navigation - Left 7/18/2022      Pt is here today for Second post-operative followup of her Arthroplasty, Knee, Total, Using Computer-assisted Navigation - Left.  she is doing well.  We have reviewed her findings and discussed plan of care and future treatment options, including the physical therapy plan.    Patient is 6 weeks post op left TKA. She is doing well, still having some pain. She is still in therapy at [facility]. Reports she has some aching pain mostly at night. She is no longer taking pain medication.     Physical Exam:     The affected knee was inspected today.   The wound is healing well with no signs of erythema or warmth.  There is no drainage.  No clinical signs or symptoms of infection are present.   ROM:   -Benjamín's sign.  2+ pulses are present.     X-Ray Knee AP LAT with Sunrise Left    Result Date: 9/1/2022  See Procedure Notes for results. IMPRESSION: Please see Ortho procedure notes for report.  This procedure was auto-finalized by: Virtual Radiologist   Radiographs left knee obtained today demonstrating stable appearing posterior stabilized prosthesis in excellent alignment    Assessment and Plan  There are no diagnoses linked to this encounter.          We will continue post-operative physical therapy until the patient feels that they are independent with a home rehab program.  Return to work status/ return to activities status was discussed today in detail. All questions were answered.    The use of stockings and DVT prophylaxis was discussed.  Will follow up in additional 4-6 weeks with radiographs at that time.     There are no Patient Instructions on file for this visit.

## 2022-09-30 ENCOUNTER — EXTERNAL CHRONIC CARE MANAGEMENT (OUTPATIENT)
Dept: FAMILY MEDICINE | Facility: CLINIC | Age: 72
End: 2022-09-30
Payer: MEDICARE

## 2022-09-30 PROCEDURE — G0511 CCM/BHI BY RHC/FQHC 20MIN MO: HCPCS | Mod: ,,, | Performed by: FAMILY MEDICINE

## 2022-09-30 PROCEDURE — G0511 PR CHRONIC CARE MGMT, RHC OR FQHC ONLY, 20 MINS OR MORE: ICD-10-PCS | Mod: ,,, | Performed by: FAMILY MEDICINE

## 2022-10-06 DIAGNOSIS — Z96.652 S/P TKR (TOTAL KNEE REPLACEMENT), LEFT: Primary | ICD-10-CM

## 2022-10-13 ENCOUNTER — HOSPITAL ENCOUNTER (OUTPATIENT)
Dept: RADIOLOGY | Facility: HOSPITAL | Age: 72
Discharge: HOME | End: 2022-10-13
Attending: NURSE PRACTITIONER
Payer: MEDICARE

## 2022-10-13 ENCOUNTER — OFFICE VISIT (OUTPATIENT)
Dept: ORTHOPEDICS | Facility: CLINIC | Age: 72
End: 2022-10-13
Payer: MEDICARE

## 2022-10-13 ENCOUNTER — OFFICE VISIT (OUTPATIENT)
Dept: FAMILY MEDICINE | Facility: CLINIC | Age: 72
End: 2022-10-13
Payer: MEDICARE

## 2022-10-13 VITALS
OXYGEN SATURATION: 99 % | DIASTOLIC BLOOD PRESSURE: 80 MMHG | HEART RATE: 63 BPM | BODY MASS INDEX: 40.32 KG/M2 | WEIGHT: 242 LBS | SYSTOLIC BLOOD PRESSURE: 140 MMHG | RESPIRATION RATE: 18 BRPM | HEIGHT: 65 IN

## 2022-10-13 DIAGNOSIS — I10 ESSENTIAL HYPERTENSION: Primary | Chronic | ICD-10-CM

## 2022-10-13 DIAGNOSIS — Z96.652 S/P TKR (TOTAL KNEE REPLACEMENT), LEFT: ICD-10-CM

## 2022-10-13 DIAGNOSIS — J30.9 ALLERGIC RHINITIS, UNSPECIFIED SEASONALITY, UNSPECIFIED TRIGGER: ICD-10-CM

## 2022-10-13 DIAGNOSIS — E03.9 HYPOTHYROIDISM, UNSPECIFIED TYPE: Chronic | ICD-10-CM

## 2022-10-13 DIAGNOSIS — E87.6 HYPOKALEMIA: ICD-10-CM

## 2022-10-13 DIAGNOSIS — Z96.652 S/P TKR (TOTAL KNEE REPLACEMENT), LEFT: Primary | ICD-10-CM

## 2022-10-13 DIAGNOSIS — R73.9 HYPERGLYCEMIA: ICD-10-CM

## 2022-10-13 DIAGNOSIS — Z23 NEED FOR INFLUENZA VACCINATION: ICD-10-CM

## 2022-10-13 DIAGNOSIS — M10.9 GOUT, UNSPECIFIED CAUSE, UNSPECIFIED CHRONICITY, UNSPECIFIED SITE: Chronic | ICD-10-CM

## 2022-10-13 LAB
ALBUMIN SERPL BCP-MCNC: 3.3 G/DL (ref 3.5–5)
ALBUMIN/GLOB SERPL: 0.8 {RATIO}
ALP SERPL-CCNC: 91 U/L (ref 55–142)
ALT SERPL W P-5'-P-CCNC: 14 U/L (ref 13–56)
ANION GAP SERPL CALCULATED.3IONS-SCNC: 12 MMOL/L (ref 7–16)
AST SERPL W P-5'-P-CCNC: 6 U/L (ref 15–37)
BASOPHILS # BLD AUTO: 0.03 K/UL (ref 0–0.2)
BASOPHILS NFR BLD AUTO: 0.3 % (ref 0–1)
BILIRUB SERPL-MCNC: 0.3 MG/DL (ref ?–1.2)
BILIRUB UR QL STRIP: NEGATIVE
BUN SERPL-MCNC: 11 MG/DL (ref 7–18)
BUN/CREAT SERPL: 11 (ref 6–20)
CALCIUM SERPL-MCNC: 9 MG/DL (ref 8.5–10.1)
CHLORIDE SERPL-SCNC: 108 MMOL/L (ref 98–107)
CHOLEST SERPL-MCNC: 153 MG/DL (ref 0–200)
CHOLEST/HDLC SERPL: 2.9 {RATIO}
CLARITY UR: CLEAR
CO2 SERPL-SCNC: 24 MMOL/L (ref 21–32)
COLOR UR: NORMAL
CREAT SERPL-MCNC: 1.03 MG/DL (ref 0.55–1.02)
DIFFERENTIAL METHOD BLD: ABNORMAL
EGFR (NO RACE VARIABLE) (RUSH/TITUS): 58 ML/MIN/1.73M²
EOSINOPHIL # BLD AUTO: 0.23 K/UL (ref 0–0.5)
EOSINOPHIL NFR BLD AUTO: 2.5 % (ref 1–4)
ERYTHROCYTE [DISTWIDTH] IN BLOOD BY AUTOMATED COUNT: 17 % (ref 11.5–14.5)
EST. AVERAGE GLUCOSE BLD GHB EST-MCNC: 120 MG/DL
GLOBULIN SER-MCNC: 4.1 G/DL (ref 2–4)
GLUCOSE SERPL-MCNC: 88 MG/DL (ref 74–106)
GLUCOSE UR STRIP-MCNC: NORMAL MG/DL
HBA1C MFR BLD HPLC: 6.2 % (ref 4.5–6.6)
HCT VFR BLD AUTO: 32.8 % (ref 38–47)
HDLC SERPL-MCNC: 53 MG/DL (ref 40–60)
HGB BLD-MCNC: 10.2 G/DL (ref 12–16)
IMM GRANULOCYTES # BLD AUTO: 0.03 K/UL (ref 0–0.04)
IMM GRANULOCYTES NFR BLD: 0.3 % (ref 0–0.4)
KETONES UR STRIP-SCNC: NEGATIVE MG/DL
LDLC SERPL CALC-MCNC: 83 MG/DL
LDLC/HDLC SERPL: 1.6 {RATIO}
LEUKOCYTE ESTERASE UR QL STRIP: NEGATIVE
LYMPHOCYTES # BLD AUTO: 1.82 K/UL (ref 1–4.8)
LYMPHOCYTES NFR BLD AUTO: 20.1 % (ref 27–41)
MCH RBC QN AUTO: 26.5 PG (ref 27–31)
MCHC RBC AUTO-ENTMCNC: 31.1 G/DL (ref 32–36)
MCV RBC AUTO: 85.2 FL (ref 80–96)
MONOCYTES # BLD AUTO: 0.76 K/UL (ref 0–0.8)
MONOCYTES NFR BLD AUTO: 8.4 % (ref 2–6)
MPC BLD CALC-MCNC: 9.8 FL (ref 9.4–12.4)
NEUTROPHILS # BLD AUTO: 6.19 K/UL (ref 1.8–7.7)
NEUTROPHILS NFR BLD AUTO: 68.4 % (ref 53–65)
NITRITE UR QL STRIP: NEGATIVE
NONHDLC SERPL-MCNC: 100 MG/DL
NRBC # BLD AUTO: 0 X10E3/UL
NRBC, AUTO (.00): 0 %
PH UR STRIP: 5.5 PH UNITS
PLATELET # BLD AUTO: 521 K/UL (ref 150–400)
POTASSIUM SERPL-SCNC: 3.6 MMOL/L (ref 3.5–5.1)
PROT SERPL-MCNC: 7.4 G/DL (ref 6.4–8.2)
PROT UR QL STRIP: NEGATIVE
RBC # BLD AUTO: 3.85 M/UL (ref 4.2–5.4)
RBC # UR STRIP: NEGATIVE /UL
SODIUM SERPL-SCNC: 140 MMOL/L (ref 136–145)
SP GR UR STRIP: 1.01
TRIGL SERPL-MCNC: 85 MG/DL (ref 35–150)
TSH SERPL DL<=0.005 MIU/L-ACNC: 2.08 UIU/ML (ref 0.36–3.74)
URATE SERPL-MCNC: 5.8 MG/DL (ref 2.6–6)
UROBILINOGEN UR STRIP-ACNC: NORMAL MG/DL
VLDLC SERPL-MCNC: 17 MG/DL
WBC # BLD AUTO: 9.06 K/UL (ref 4.5–11)

## 2022-10-13 PROCEDURE — 90694 FLU VACCINE - QUADRIVALENT - ADJUVANTED: ICD-10-PCS | Mod: ,,, | Performed by: FAMILY MEDICINE

## 2022-10-13 PROCEDURE — 80053 COMPREHEN METABOLIC PANEL: CPT | Mod: ,,, | Performed by: CLINICAL MEDICAL LABORATORY

## 2022-10-13 PROCEDURE — 99024 POSTOP FOLLOW-UP VISIT: CPT | Mod: ,,, | Performed by: NURSE PRACTITIONER

## 2022-10-13 PROCEDURE — G0008 ADMIN INFLUENZA VIRUS VAC: HCPCS | Mod: ,,, | Performed by: FAMILY MEDICINE

## 2022-10-13 PROCEDURE — 99214 OFFICE O/P EST MOD 30 MIN: CPT | Mod: ,,, | Performed by: FAMILY MEDICINE

## 2022-10-13 PROCEDURE — 73562 X-RAY EXAM OF KNEE 3: CPT | Mod: 26,LT,, | Performed by: RADIOLOGY

## 2022-10-13 PROCEDURE — 99024 PR POST-OP FOLLOW-UP VISIT: ICD-10-PCS | Mod: ,,, | Performed by: NURSE PRACTITIONER

## 2022-10-13 PROCEDURE — 99214 PR OFFICE/OUTPT VISIT, EST, LEVL IV, 30-39 MIN: ICD-10-PCS | Mod: ,,, | Performed by: FAMILY MEDICINE

## 2022-10-13 PROCEDURE — G0008 FLU VACCINE - QUADRIVALENT - ADJUVANTED: ICD-10-PCS | Mod: ,,, | Performed by: FAMILY MEDICINE

## 2022-10-13 PROCEDURE — 85025 CBC WITH DIFFERENTIAL: ICD-10-PCS | Mod: ,,, | Performed by: CLINICAL MEDICAL LABORATORY

## 2022-10-13 PROCEDURE — 80061 LIPID PANEL: ICD-10-PCS | Mod: ,,, | Performed by: CLINICAL MEDICAL LABORATORY

## 2022-10-13 PROCEDURE — 84550 URIC ACID: ICD-10-PCS | Mod: ,,, | Performed by: CLINICAL MEDICAL LABORATORY

## 2022-10-13 PROCEDURE — 80061 LIPID PANEL: CPT | Mod: ,,, | Performed by: CLINICAL MEDICAL LABORATORY

## 2022-10-13 PROCEDURE — 84550 ASSAY OF BLOOD/URIC ACID: CPT | Mod: ,,, | Performed by: CLINICAL MEDICAL LABORATORY

## 2022-10-13 PROCEDURE — 84443 ASSAY THYROID STIM HORMONE: CPT | Mod: ,,, | Performed by: CLINICAL MEDICAL LABORATORY

## 2022-10-13 PROCEDURE — 83036 HEMOGLOBIN A1C: ICD-10-PCS | Mod: ,,, | Performed by: CLINICAL MEDICAL LABORATORY

## 2022-10-13 PROCEDURE — 81003 URINALYSIS AUTO W/O SCOPE: CPT | Mod: QW,,, | Performed by: CLINICAL MEDICAL LABORATORY

## 2022-10-13 PROCEDURE — 83036 HEMOGLOBIN GLYCOSYLATED A1C: CPT | Mod: ,,, | Performed by: CLINICAL MEDICAL LABORATORY

## 2022-10-13 PROCEDURE — 80053 COMPREHENSIVE METABOLIC PANEL: ICD-10-PCS | Mod: ,,, | Performed by: CLINICAL MEDICAL LABORATORY

## 2022-10-13 PROCEDURE — 85025 COMPLETE CBC W/AUTO DIFF WBC: CPT | Mod: ,,, | Performed by: CLINICAL MEDICAL LABORATORY

## 2022-10-13 PROCEDURE — 90694 VACC AIIV4 NO PRSRV 0.5ML IM: CPT | Mod: ,,, | Performed by: FAMILY MEDICINE

## 2022-10-13 PROCEDURE — 73562 X-RAY EXAM OF KNEE 3: CPT | Mod: TC,LT

## 2022-10-13 PROCEDURE — 73562 XR KNEE 3 VIEW LEFT: ICD-10-PCS | Mod: 26,LT,, | Performed by: RADIOLOGY

## 2022-10-13 PROCEDURE — 81003 URINALYSIS, REFLEX TO URINE CULTURE: ICD-10-PCS | Mod: QW,,, | Performed by: CLINICAL MEDICAL LABORATORY

## 2022-10-13 PROCEDURE — 84443 TSH: ICD-10-PCS | Mod: ,,, | Performed by: CLINICAL MEDICAL LABORATORY

## 2022-10-13 RX ORDER — ALBUTEROL SULFATE 90 UG/1
2 AEROSOL, METERED RESPIRATORY (INHALATION) EVERY 4 HOURS PRN
Qty: 25.5 G | Refills: 3 | Status: SHIPPED | OUTPATIENT
Start: 2022-10-13

## 2022-10-13 RX ORDER — CELECOXIB 200 MG/1
200 CAPSULE ORAL DAILY
Qty: 30 CAPSULE | Refills: 1 | Status: SHIPPED | OUTPATIENT
Start: 2022-10-13 | End: 2023-04-13

## 2022-10-13 RX ORDER — MONTELUKAST SODIUM 10 MG/1
10 TABLET ORAL DAILY
Qty: 90 TABLET | Refills: 3 | Status: SHIPPED | OUTPATIENT
Start: 2022-10-13 | End: 2023-10-06 | Stop reason: SDUPTHER

## 2022-10-13 RX ORDER — ALLOPURINOL 100 MG/1
200 TABLET ORAL DAILY
Qty: 180 TABLET | Refills: 3 | Status: SHIPPED | OUTPATIENT
Start: 2022-10-13 | End: 2023-10-06 | Stop reason: SDUPTHER

## 2022-10-13 NOTE — PROGRESS NOTES
Post-Operative Total Knee        No surgery found No surgery found      Pt is here today for Third post-operative followup of her No surgery found.  she is doing well.  We have reviewed her findings and discussed plan of care and future treatment options, including the physical therapy plan.      Patient is 3 months post op left total knee replacement. She is doing well, still some stiffness at times. Reports her motion is good but she still has some swelling. She has 1 more week of therapy. She also does her exercises at home.     Physical Exam:     The affected knee was inspected today.   The wound is healing well with no signs of erythema or warmth.  There is no drainage.  No clinical signs or symptoms of infection are present.   ROM: 0-100  -Benjamín's sign.  2+ pulses are present.         Assessment and Plan  There are no diagnoses linked to this encounter.          We will continue post-operative physical therapy until the patient feels that they are independent with a home rehab program.  Return to work status/ return to activities status was discussed today in detail. All questions were answered.    The use of stockings and DVT prophylaxis was discussed.  Will follow up in additional 3 months with radiographs at that time.   Celebrex daily    There are no Patient Instructions on file for this visit.

## 2022-10-13 NOTE — PROGRESS NOTES
"Subjective:       Patient ID: Janay Reveles is a 72 y.o. female.    Chief Complaint: Follow-up (6 month follow-up)    73 yo BF here for 6 month check up and lab. Still recovering from left total knee.     Review of Systems   Constitutional:  Negative for activity change.   Eyes:  Negative for visual disturbance.   Respiratory:  Negative for shortness of breath.    Cardiovascular:  Negative for chest pain.   Gastrointestinal:  Negative for abdominal pain.   Neurological:  Negative for headaches.   Psychiatric/Behavioral:  Negative for dysphoric mood.        Objective:    Blood pressure (!) 140/80, pulse 63, resp. rate 18, height 5' 5" (1.651 m), weight 109.8 kg (242 lb), SpO2 99 %.    Physical Exam  Constitutional:       Appearance: Normal appearance.   HENT:      Head: Normocephalic and atraumatic.      Right Ear: External ear normal.      Left Ear: External ear normal.      Nose: Nose normal.      Mouth/Throat:      Mouth: Mucous membranes are moist.   Eyes:      Conjunctiva/sclera: Conjunctivae normal.      Pupils: Pupils are equal, round, and reactive to light.   Cardiovascular:      Rate and Rhythm: Normal rate and regular rhythm.      Pulses: Normal pulses.      Heart sounds: Normal heart sounds.   Pulmonary:      Effort: Pulmonary effort is normal.      Breath sounds: Normal breath sounds.   Abdominal:      General: Abdomen is flat.      Palpations: Abdomen is soft.   Musculoskeletal:         General: Normal range of motion.      Cervical back: Normal range of motion and neck supple.   Skin:     General: Skin is warm and dry.   Neurological:      General: No focal deficit present.      Mental Status: She is alert and oriented to person, place, and time.   Psychiatric:         Mood and Affect: Mood normal.         Behavior: Behavior normal.         Thought Content: Thought content normal.         Judgment: Judgment normal.       Assessment:       Problem List Items Addressed This Visit          " Cardiac/Vascular    Essential hypertension - Primary    Relevant Orders    Lipid Panel    Comprehensive Metabolic Panel    CBC Auto Differential    Urinalysis, Reflex to Urine Culture       Endocrine    Hypothyroidism (Chronic)    Relevant Orders    TSH    Hyperglycemia    Relevant Orders    Hemoglobin A1C    Comprehensive Metabolic Panel       Orthopedic    Gout    Relevant Medications    allopurinoL (ZYLOPRIM) 100 MG tablet    Other Relevant Orders    Uric Acid     Other Visit Diagnoses       Hypokalemia        Need for influenza vaccination        Relevant Orders    Influenza - Quadrivalent (Adjuvanted) (Completed)    Allergic rhinitis, unspecified seasonality, unspecified trigger        Relevant Medications    montelukast (SINGULAIR) 10 mg tablet              Plan:       RTC 6 months for recheck and no lab planned. Get Omicron booster soon.

## 2022-10-21 NOTE — PROGRESS NOTES
Pt made aware, she will add iron, otc and will take women MVI.daily.  Will watch carb intake to help with HgA1c

## 2022-10-31 ENCOUNTER — EXTERNAL CHRONIC CARE MANAGEMENT (OUTPATIENT)
Dept: FAMILY MEDICINE | Facility: CLINIC | Age: 72
End: 2022-10-31
Payer: MEDICARE

## 2022-10-31 PROCEDURE — G0511 PR CHRONIC CARE MGMT, RHC OR FQHC ONLY, 20 MINS OR MORE: ICD-10-PCS | Mod: ,,, | Performed by: FAMILY MEDICINE

## 2022-10-31 PROCEDURE — G0511 CCM/BHI BY RHC/FQHC 20MIN MO: HCPCS | Mod: ,,, | Performed by: FAMILY MEDICINE

## 2022-11-02 ENCOUNTER — TELEPHONE (OUTPATIENT)
Dept: ORTHOPEDICS | Facility: CLINIC | Age: 72
End: 2022-11-02
Payer: MEDICARE

## 2022-11-02 NOTE — TELEPHONE ENCOUNTER
----- Message from Lula Chong sent at 11/1/2022  2:44 PM CDT -----  Patient is ready to return to she is needing a Release form patient call back number [phone].

## 2022-11-30 ENCOUNTER — EXTERNAL CHRONIC CARE MANAGEMENT (OUTPATIENT)
Dept: FAMILY MEDICINE | Facility: CLINIC | Age: 72
End: 2022-11-30
Payer: MEDICARE

## 2022-11-30 PROCEDURE — G0511 CCM/BHI BY RHC/FQHC 20MIN MO: HCPCS | Mod: ,,, | Performed by: FAMILY MEDICINE

## 2022-11-30 PROCEDURE — G0511 PR CHRONIC CARE MGMT, RHC OR FQHC ONLY, 20 MINS OR MORE: ICD-10-PCS | Mod: ,,, | Performed by: FAMILY MEDICINE

## 2022-12-08 ENCOUNTER — OFFICE VISIT (OUTPATIENT)
Dept: FAMILY MEDICINE | Facility: CLINIC | Age: 72
End: 2022-12-08
Payer: MEDICARE

## 2022-12-08 ENCOUNTER — TELEPHONE (OUTPATIENT)
Dept: FAMILY MEDICINE | Facility: CLINIC | Age: 72
End: 2022-12-08
Payer: MEDICARE

## 2022-12-08 VITALS
HEIGHT: 65 IN | HEART RATE: 75 BPM | BODY MASS INDEX: 38.65 KG/M2 | WEIGHT: 232 LBS | SYSTOLIC BLOOD PRESSURE: 132 MMHG | DIASTOLIC BLOOD PRESSURE: 80 MMHG | RESPIRATION RATE: 18 BRPM | OXYGEN SATURATION: 99 % | TEMPERATURE: 98 F

## 2022-12-08 DIAGNOSIS — L29.9 ITCHING: Primary | ICD-10-CM

## 2022-12-08 PROCEDURE — 96372 PR INJECTION,THERAP/PROPH/DIAG2ST, IM OR SUBCUT: ICD-10-PCS | Mod: ,,, | Performed by: FAMILY MEDICINE

## 2022-12-08 PROCEDURE — 99213 OFFICE O/P EST LOW 20 MIN: CPT | Mod: ,,, | Performed by: FAMILY MEDICINE

## 2022-12-08 PROCEDURE — 96372 THER/PROPH/DIAG INJ SC/IM: CPT | Mod: ,,, | Performed by: FAMILY MEDICINE

## 2022-12-08 PROCEDURE — 99213 PR OFFICE/OUTPT VISIT, EST, LEVL III, 20-29 MIN: ICD-10-PCS | Mod: ,,, | Performed by: FAMILY MEDICINE

## 2022-12-08 RX ORDER — METHYLPREDNISOLONE ACETATE 80 MG/ML
80 INJECTION, SUSPENSION INTRA-ARTICULAR; INTRALESIONAL; INTRAMUSCULAR; SOFT TISSUE ONCE
Status: COMPLETED | OUTPATIENT
Start: 2022-12-08 | End: 2022-12-08

## 2022-12-08 RX ADMIN — METHYLPREDNISOLONE ACETATE 80 MG: 80 INJECTION, SUSPENSION INTRA-ARTICULAR; INTRALESIONAL; INTRAMUSCULAR; SOFT TISSUE at 04:12

## 2022-12-08 NOTE — TELEPHONE ENCOUNTER
Pt called stated she it having a terrible itching at night.  It involves her arms, torso and neck.  No rash is seen but when she scratches it looks like some type of whelp.  She is using OTC creams and lotions but nothing has helped. Is coming in for eval.

## 2022-12-17 NOTE — PROGRESS NOTES
"Subjective:       Patient ID: Janay Reveles is a 72 y.o. female.    Chief Complaint: Itching (Has dark spots on her arms and neck that itch mostly at night.  Has been like this x 2 weeks.)    71 yo BF here with complaints of itchy spots on arms and neck for about 2 weeks. Has a little on her back and on right lower leg.     Itching  Pertinent negatives include no abdominal pain, chest pain or headaches.   Review of Systems   Constitutional:  Negative for activity change.   Eyes:  Negative for visual disturbance.   Respiratory:  Negative for shortness of breath.    Cardiovascular:  Negative for chest pain.   Gastrointestinal:  Negative for abdominal pain.   Integumentary:  Positive for itching.   Neurological:  Negative for headaches.   Psychiatric/Behavioral:  Negative for dysphoric mood.        Objective:    Blood pressure 132/80, pulse 75, temperature 98.1 °F (36.7 °C), temperature source Oral, resp. rate 18, height 5' 5" (1.651 m), weight 105.2 kg (232 lb), SpO2 99 %.    Physical Exam  Constitutional:       Appearance: Normal appearance.   HENT:      Head: Normocephalic and atraumatic.      Nose: Nose normal.      Mouth/Throat:      Mouth: Mucous membranes are moist.   Eyes:      Pupils: Pupils are equal, round, and reactive to light.   Cardiovascular:      Rate and Rhythm: Normal rate and regular rhythm.   Pulmonary:      Effort: Pulmonary effort is normal.   Abdominal:      General: Abdomen is flat.   Skin:     General: Skin is warm.      Comments: Slight excoriated areas on neck and arms.    Neurological:      General: No focal deficit present.      Mental Status: She is alert.   Psychiatric:         Mood and Affect: Mood normal.       Assessment:       Problem List Items Addressed This Visit    None  Visit Diagnoses       Itching    -  Primary    Relevant Medications    methylPREDNISolone acetate injection 80 mg (Completed)              Plan:       RTC prn. Add claritin and or benadryl.        "

## 2022-12-31 ENCOUNTER — EXTERNAL CHRONIC CARE MANAGEMENT (OUTPATIENT)
Dept: FAMILY MEDICINE | Facility: CLINIC | Age: 72
End: 2022-12-31
Payer: MEDICARE

## 2022-12-31 PROCEDURE — G0511 PR CHRONIC CARE MGMT, RHC OR FQHC ONLY, 20 MINS OR MORE: ICD-10-PCS | Mod: ,,, | Performed by: FAMILY MEDICINE

## 2022-12-31 PROCEDURE — G0511 CCM/BHI BY RHC/FQHC 20MIN MO: HCPCS | Mod: ,,, | Performed by: FAMILY MEDICINE

## 2023-01-09 DIAGNOSIS — Z96.652 S/P TKR (TOTAL KNEE REPLACEMENT), LEFT: Primary | ICD-10-CM

## 2023-01-12 ENCOUNTER — OFFICE VISIT (OUTPATIENT)
Dept: ORTHOPEDICS | Facility: CLINIC | Age: 73
End: 2023-01-12
Payer: MEDICARE

## 2023-01-12 ENCOUNTER — HOSPITAL ENCOUNTER (OUTPATIENT)
Dept: RADIOLOGY | Facility: HOSPITAL | Age: 73
Discharge: HOME | End: 2023-01-12
Attending: ORTHOPAEDIC SURGERY
Payer: MEDICARE

## 2023-01-12 DIAGNOSIS — Z96.652 S/P TKR (TOTAL KNEE REPLACEMENT), LEFT: ICD-10-CM

## 2023-01-12 DIAGNOSIS — Z96.659 STATUS POST TOTAL KNEE REPLACEMENT, UNSPECIFIED LATERALITY: Primary | ICD-10-CM

## 2023-01-12 PROCEDURE — 73560 X-RAY EXAM OF KNEE 1 OR 2: CPT | Mod: 26,LT,, | Performed by: ORTHOPAEDIC SURGERY

## 2023-01-12 PROCEDURE — 73560 XR KNEE 1 OR 2 VIEW LEFT: ICD-10-PCS | Mod: 26,LT,, | Performed by: ORTHOPAEDIC SURGERY

## 2023-01-12 PROCEDURE — 99212 OFFICE O/P EST SF 10 MIN: CPT | Mod: PBBFAC | Performed by: ORTHOPAEDIC SURGERY

## 2023-01-12 PROCEDURE — 73560 X-RAY EXAM OF KNEE 1 OR 2: CPT | Mod: TC,LT

## 2023-01-12 PROCEDURE — 99213 PR OFFICE/OUTPT VISIT, EST, LEVL III, 20-29 MIN: ICD-10-PCS | Mod: S$PBB,,, | Performed by: ORTHOPAEDIC SURGERY

## 2023-01-12 PROCEDURE — 99213 OFFICE O/P EST LOW 20 MIN: CPT | Mod: S$PBB,,, | Performed by: ORTHOPAEDIC SURGERY

## 2023-01-12 NOTE — PROGRESS NOTES
CC:  Knee pain    72 y.o. Female returns to clinic for a follow up visit regarding knee pain.       Patient is 5 1/2 months post op left total knee replacement, doing well. Reports she has pain sometimes but its improving.        Past Medical History:   Diagnosis Date    Anemia     Encounter for long-term (current) use of other medications     Encounter for screening colonoscopy 05/13/2015    Essential hypertension     Goiter     Gout     H/O bone density study 02/27/2020    History of esophagogastroduodenoscopy (EGD) 01/2020    Hypokalemia     Hypothyroidism     Insomnia     Screening for malignant neoplasm of cervix 01/20/2020    Screening mammogram, encounter for 07/16/2021    Andersons/negative exam/scattered fibroglandular densities/recommended anual screening    Screening mammogram, encounter for 06/11/2020     Past Surgical History:   Procedure Laterality Date    HYSTERECTOMY      TUBAL LIGATION           PHYSICAL EXAMINATION:  LMP  (LMP Unknown) Comment: partial hysterectomy  Ortho/SPM Exam  0-120. Well healed incision, no effusion noted.  Stable throughout the entire arc of motion    IMAGING:  X-Ray Knee 1 or 2 View Left    Result Date: 1/12/2023  See Procedure Notes for results. IMPRESSION: Please see Ortho procedure notes for report.  This procedure was auto-finalized by: Virtual Radiologist     Radiographs left knee demonstrate a cemented posterior stabilized total knee component in excellent alignment  ASSESSMENT:      ICD-10-CM ICD-9-CM   1. Status post total knee replacement, unspecified laterality  Z96.659 V43.65       PLAN:     -Findings and treatment options were discussed with the patient  -All questions answered  Natural history and expected course discussed. Questions answered.  Educational materials distributed.  At this point she is doing excellent following her total knee arthroplasty.  Continue recurrent activity level.  Will see us back in 12 months to get repeat x-rays.  If any issues  take place between now and then she is instructed return to clinic for further evaluation.  Otherwise she is doing quite well and can follow up at that time point    There are no Patient Instructions on file for this visit.      No orders of the defined types were placed in this encounter.        Procedures

## 2023-01-31 ENCOUNTER — EXTERNAL CHRONIC CARE MANAGEMENT (OUTPATIENT)
Dept: FAMILY MEDICINE | Facility: CLINIC | Age: 73
End: 2023-01-31
Payer: MEDICARE

## 2023-01-31 PROCEDURE — G0511 PR CHRONIC CARE MGMT, RHC OR FQHC ONLY, 20 MINS OR MORE: ICD-10-PCS | Mod: ,,, | Performed by: FAMILY MEDICINE

## 2023-01-31 PROCEDURE — G0511 CCM/BHI BY RHC/FQHC 20MIN MO: HCPCS | Mod: ,,, | Performed by: FAMILY MEDICINE

## 2023-02-28 ENCOUNTER — EXTERNAL CHRONIC CARE MANAGEMENT (OUTPATIENT)
Dept: FAMILY MEDICINE | Facility: CLINIC | Age: 73
End: 2023-02-28
Payer: MEDICARE

## 2023-02-28 PROCEDURE — G0511 PR CHRONIC CARE MGMT, RHC OR FQHC ONLY, 20 MINS OR MORE: ICD-10-PCS | Mod: ,,, | Performed by: FAMILY MEDICINE

## 2023-02-28 PROCEDURE — G0511 CCM/BHI BY RHC/FQHC 20MIN MO: HCPCS | Mod: ,,, | Performed by: FAMILY MEDICINE

## 2023-03-31 ENCOUNTER — EXTERNAL CHRONIC CARE MANAGEMENT (OUTPATIENT)
Dept: FAMILY MEDICINE | Facility: CLINIC | Age: 73
End: 2023-03-31
Payer: MEDICARE

## 2023-03-31 PROCEDURE — G0511 CCM/BHI BY RHC/FQHC 20MIN MO: HCPCS | Mod: ,,, | Performed by: FAMILY MEDICINE

## 2023-03-31 PROCEDURE — G0511 PR CHRONIC CARE MGMT, RHC OR FQHC ONLY, 20 MINS OR MORE: ICD-10-PCS | Mod: ,,, | Performed by: FAMILY MEDICINE

## 2023-04-13 ENCOUNTER — APPOINTMENT (OUTPATIENT)
Dept: RADIOLOGY | Facility: CLINIC | Age: 73
End: 2023-04-13
Attending: FAMILY MEDICINE
Payer: MEDICARE

## 2023-04-13 ENCOUNTER — OFFICE VISIT (OUTPATIENT)
Dept: FAMILY MEDICINE | Facility: CLINIC | Age: 73
End: 2023-04-13
Payer: MEDICARE

## 2023-04-13 VITALS
DIASTOLIC BLOOD PRESSURE: 85 MMHG | TEMPERATURE: 98 F | BODY MASS INDEX: 39.99 KG/M2 | SYSTOLIC BLOOD PRESSURE: 137 MMHG | HEIGHT: 65 IN | OXYGEN SATURATION: 96 % | WEIGHT: 240 LBS | HEART RATE: 70 BPM | RESPIRATION RATE: 18 BRPM

## 2023-04-13 DIAGNOSIS — M25.561 ACUTE PAIN OF RIGHT KNEE: ICD-10-CM

## 2023-04-13 DIAGNOSIS — E55.9 VITAMIN D DEFICIENCY: Chronic | ICD-10-CM

## 2023-04-13 DIAGNOSIS — E87.6 HYPOKALEMIA: ICD-10-CM

## 2023-04-13 DIAGNOSIS — D64.9 ANEMIA, UNSPECIFIED TYPE: ICD-10-CM

## 2023-04-13 DIAGNOSIS — M17.12 OSTEOARTHRITIS OF LEFT KNEE, UNSPECIFIED OSTEOARTHRITIS TYPE: Chronic | ICD-10-CM

## 2023-04-13 DIAGNOSIS — M10.9 GOUT, UNSPECIFIED CAUSE, UNSPECIFIED CHRONICITY, UNSPECIFIED SITE: ICD-10-CM

## 2023-04-13 DIAGNOSIS — E66.01 MORBID OBESITY: ICD-10-CM

## 2023-04-13 DIAGNOSIS — R73.9 HYPERGLYCEMIA: Chronic | ICD-10-CM

## 2023-04-13 DIAGNOSIS — I10 ESSENTIAL HYPERTENSION: Primary | Chronic | ICD-10-CM

## 2023-04-13 DIAGNOSIS — M25.569 KNEE PAIN, UNSPECIFIED CHRONICITY, UNSPECIFIED LATERALITY: ICD-10-CM

## 2023-04-13 DIAGNOSIS — E03.9 HYPOTHYROIDISM, UNSPECIFIED TYPE: Chronic | ICD-10-CM

## 2023-04-13 LAB
25(OH)D3 SERPL-MCNC: 19.8 NG/ML
ALBUMIN SERPL BCP-MCNC: 3.6 G/DL (ref 3.5–5)
ALBUMIN/GLOB SERPL: 0.8 {RATIO}
ALP SERPL-CCNC: 86 U/L (ref 55–142)
ALT SERPL W P-5'-P-CCNC: 15 U/L (ref 13–56)
ANION GAP SERPL CALCULATED.3IONS-SCNC: 12 MMOL/L (ref 7–16)
AST SERPL W P-5'-P-CCNC: 11 U/L (ref 15–37)
BASOPHILS # BLD AUTO: 0.05 K/UL (ref 0–0.2)
BASOPHILS NFR BLD AUTO: 0.5 % (ref 0–1)
BILIRUB SERPL-MCNC: 0.3 MG/DL (ref ?–1.2)
BILIRUB UR QL STRIP: NEGATIVE
BUN SERPL-MCNC: 15 MG/DL (ref 7–18)
BUN/CREAT SERPL: 15 (ref 6–20)
CALCIUM SERPL-MCNC: 9.7 MG/DL (ref 8.5–10.1)
CHLORIDE SERPL-SCNC: 104 MMOL/L (ref 98–107)
CHOLEST SERPL-MCNC: 148 MG/DL (ref 0–200)
CHOLEST/HDLC SERPL: 2.4 {RATIO}
CLARITY UR: CLEAR
CO2 SERPL-SCNC: 27 MMOL/L (ref 21–32)
COLOR UR: COLORLESS
CREAT SERPL-MCNC: 0.99 MG/DL (ref 0.55–1.02)
DIFFERENTIAL METHOD BLD: ABNORMAL
EGFR (NO RACE VARIABLE) (RUSH/TITUS): 60 ML/MIN/1.73M²
EOSINOPHIL # BLD AUTO: 0.2 K/UL (ref 0–0.5)
EOSINOPHIL NFR BLD AUTO: 2 % (ref 1–4)
ERYTHROCYTE [DISTWIDTH] IN BLOOD BY AUTOMATED COUNT: 17 % (ref 11.5–14.5)
EST. AVERAGE GLUCOSE BLD GHB EST-MCNC: 124 MG/DL
GLOBULIN SER-MCNC: 4.8 G/DL (ref 2–4)
GLUCOSE SERPL-MCNC: 117 MG/DL (ref 74–106)
GLUCOSE UR STRIP-MCNC: NORMAL MG/DL
HBA1C MFR BLD HPLC: 6.3 % (ref 4.5–6.6)
HCT VFR BLD AUTO: 37.8 % (ref 38–47)
HDLC SERPL-MCNC: 62 MG/DL (ref 40–60)
HGB BLD-MCNC: 11.7 G/DL (ref 12–16)
IMM GRANULOCYTES # BLD AUTO: 0.03 K/UL (ref 0–0.04)
IMM GRANULOCYTES NFR BLD: 0.3 % (ref 0–0.4)
KETONES UR STRIP-SCNC: NEGATIVE MG/DL
LDLC SERPL CALC-MCNC: 76 MG/DL
LDLC/HDLC SERPL: 1.2 {RATIO}
LEUKOCYTE ESTERASE UR QL STRIP: NEGATIVE
LYMPHOCYTES # BLD AUTO: 1.71 K/UL (ref 1–4.8)
LYMPHOCYTES NFR BLD AUTO: 17.4 % (ref 27–41)
MCH RBC QN AUTO: 27.1 PG (ref 27–31)
MCHC RBC AUTO-ENTMCNC: 31 G/DL (ref 32–36)
MCV RBC AUTO: 87.7 FL (ref 80–96)
MONOCYTES # BLD AUTO: 0.64 K/UL (ref 0–0.8)
MONOCYTES NFR BLD AUTO: 6.5 % (ref 2–6)
MPC BLD CALC-MCNC: 9.2 FL (ref 9.4–12.4)
NEUTROPHILS # BLD AUTO: 7.22 K/UL (ref 1.8–7.7)
NEUTROPHILS NFR BLD AUTO: 73.3 % (ref 53–65)
NITRITE UR QL STRIP: NEGATIVE
NONHDLC SERPL-MCNC: 86 MG/DL
NRBC # BLD AUTO: 0 X10E3/UL
NRBC, AUTO (.00): 0 %
PH UR STRIP: 5 PH UNITS
PLATELET # BLD AUTO: 644 K/UL (ref 150–400)
POTASSIUM SERPL-SCNC: 3.6 MMOL/L (ref 3.5–5.1)
PROT SERPL-MCNC: 8.4 G/DL (ref 6.4–8.2)
PROT UR QL STRIP: NEGATIVE
RBC # BLD AUTO: 4.31 M/UL (ref 4.2–5.4)
RBC # UR STRIP: NEGATIVE /UL
SODIUM SERPL-SCNC: 139 MMOL/L (ref 136–145)
SP GR UR STRIP: 1.01
TRIGL SERPL-MCNC: 52 MG/DL (ref 35–150)
TSH SERPL DL<=0.005 MIU/L-ACNC: 2.54 UIU/ML (ref 0.36–3.74)
URATE SERPL-MCNC: 4.8 MG/DL (ref 2.6–6)
UROBILINOGEN UR STRIP-ACNC: NORMAL MG/DL
VLDLC SERPL-MCNC: 10 MG/DL
WBC # BLD AUTO: 9.85 K/UL (ref 4.5–11)

## 2023-04-13 PROCEDURE — 99214 PR OFFICE/OUTPT VISIT, EST, LEVL IV, 30-39 MIN: ICD-10-PCS | Mod: ,,, | Performed by: FAMILY MEDICINE

## 2023-04-13 PROCEDURE — 84550 ASSAY OF BLOOD/URIC ACID: CPT | Mod: ,,, | Performed by: CLINICAL MEDICAL LABORATORY

## 2023-04-13 PROCEDURE — 80053 COMPREHEN METABOLIC PANEL: CPT | Mod: ,,, | Performed by: CLINICAL MEDICAL LABORATORY

## 2023-04-13 PROCEDURE — 81003 URINALYSIS, REFLEX TO URINE CULTURE: ICD-10-PCS | Mod: QW,,, | Performed by: CLINICAL MEDICAL LABORATORY

## 2023-04-13 PROCEDURE — 84550 URIC ACID: ICD-10-PCS | Mod: ,,, | Performed by: CLINICAL MEDICAL LABORATORY

## 2023-04-13 PROCEDURE — 81003 URINALYSIS AUTO W/O SCOPE: CPT | Mod: QW,,, | Performed by: CLINICAL MEDICAL LABORATORY

## 2023-04-13 PROCEDURE — 80053 COMPREHENSIVE METABOLIC PANEL: ICD-10-PCS | Mod: ,,, | Performed by: CLINICAL MEDICAL LABORATORY

## 2023-04-13 PROCEDURE — 83036 HEMOGLOBIN GLYCOSYLATED A1C: CPT | Mod: ,,, | Performed by: CLINICAL MEDICAL LABORATORY

## 2023-04-13 PROCEDURE — 85025 CBC WITH DIFFERENTIAL: ICD-10-PCS | Mod: ,,, | Performed by: CLINICAL MEDICAL LABORATORY

## 2023-04-13 PROCEDURE — 99214 OFFICE O/P EST MOD 30 MIN: CPT | Mod: ,,, | Performed by: FAMILY MEDICINE

## 2023-04-13 PROCEDURE — 73560 XR KNEE 1 OR 2 VIEW RIGHT: ICD-10-PCS | Mod: 26,RT,, | Performed by: RADIOLOGY

## 2023-04-13 PROCEDURE — 84443 TSH: ICD-10-PCS | Mod: ,,, | Performed by: CLINICAL MEDICAL LABORATORY

## 2023-04-13 PROCEDURE — 84443 ASSAY THYROID STIM HORMONE: CPT | Mod: ,,, | Performed by: CLINICAL MEDICAL LABORATORY

## 2023-04-13 PROCEDURE — 82306 VITAMIN D 25 HYDROXY: CPT | Mod: ,,, | Performed by: CLINICAL MEDICAL LABORATORY

## 2023-04-13 PROCEDURE — 73560 X-RAY EXAM OF KNEE 1 OR 2: CPT | Mod: 26,RT,, | Performed by: RADIOLOGY

## 2023-04-13 PROCEDURE — 83036 HEMOGLOBIN A1C: ICD-10-PCS | Mod: ,,, | Performed by: CLINICAL MEDICAL LABORATORY

## 2023-04-13 PROCEDURE — 73560 X-RAY EXAM OF KNEE 1 OR 2: CPT | Mod: TC,RHCUB,RT | Performed by: FAMILY MEDICINE

## 2023-04-13 PROCEDURE — 82306 VITAMIN D: ICD-10-PCS | Mod: ,,, | Performed by: CLINICAL MEDICAL LABORATORY

## 2023-04-13 PROCEDURE — 80061 LIPID PANEL: CPT | Mod: ,,, | Performed by: CLINICAL MEDICAL LABORATORY

## 2023-04-13 PROCEDURE — 85025 COMPLETE CBC W/AUTO DIFF WBC: CPT | Mod: ,,, | Performed by: CLINICAL MEDICAL LABORATORY

## 2023-04-13 PROCEDURE — 80061 LIPID PANEL: ICD-10-PCS | Mod: ,,, | Performed by: CLINICAL MEDICAL LABORATORY

## 2023-04-13 RX ORDER — NAPROXEN 500 MG/1
500 TABLET ORAL 2 TIMES DAILY PRN
Qty: 180 TABLET | Refills: 3 | Status: SHIPPED | OUTPATIENT
Start: 2023-04-13

## 2023-04-13 NOTE — PROGRESS NOTES
"Subjective     Patient ID: Janay Reveles is a 72 y.o. female.    Chief Complaint: 6 month check up and right knee pain    71 yo BF here for 6 month check up and lab. Right knee has been hurting some off and on for a few months. Had this knee replaced in 2010 with Dr. Florian.     Review of Systems   Constitutional:  Negative for activity change.   Eyes:  Negative for visual disturbance.   Respiratory:  Negative for shortness of breath.    Cardiovascular:  Negative for chest pain.   Gastrointestinal:  Negative for abdominal pain.   Neurological:  Negative for headaches.   Psychiatric/Behavioral:  Negative for dysphoric mood.         Objective   Blood pressure 137/85, pulse 70, temperature 98 °F (36.7 °C), temperature source Oral, resp. rate 18, height 5' 5" (1.651 m), weight 108.9 kg (240 lb), SpO2 96 %.    Physical Exam  Constitutional:       Appearance: Normal appearance.   HENT:      Head: Normocephalic and atraumatic.      Right Ear: External ear normal.      Left Ear: External ear normal.      Nose: Nose normal.      Mouth/Throat:      Mouth: Mucous membranes are moist.   Eyes:      Conjunctiva/sclera: Conjunctivae normal.      Pupils: Pupils are equal, round, and reactive to light.   Cardiovascular:      Rate and Rhythm: Normal rate and regular rhythm.      Pulses: Normal pulses.      Heart sounds: Normal heart sounds.   Pulmonary:      Effort: Pulmonary effort is normal.      Breath sounds: Normal breath sounds.   Abdominal:      General: Abdomen is flat.      Palpations: Abdomen is soft.   Musculoskeletal:         General: Normal range of motion.      Cervical back: Normal range of motion and neck supple.   Skin:     General: Skin is warm and dry.   Neurological:      General: No focal deficit present.      Mental Status: She is alert and oriented to person, place, and time.   Psychiatric:         Mood and Affect: Mood normal.         Behavior: Behavior normal.         Thought Content: Thought content " normal.         Judgment: Judgment normal.          Assessment and Plan     Problem List Items Addressed This Visit          Cardiac/Vascular    Essential hypertension - Primary    Relevant Orders    CBC Auto Differential    Comprehensive Metabolic Panel    Lipid Panel    TSH    Urinalysis, Reflex to Urine Culture       Oncology    Anemia    Relevant Orders    CBC Auto Differential    Comprehensive Metabolic Panel    Hemoglobin A1C    Lipid Panel    TSH    Urinalysis, Reflex to Urine Culture       Endocrine    Hypothyroidism (Chronic)    Relevant Orders    CBC Auto Differential    Comprehensive Metabolic Panel    Hemoglobin A1C    Lipid Panel    TSH    Urinalysis, Reflex to Urine Culture    Vitamin D deficiency    Relevant Orders    Vitamin D    Morbid obesity    Hyperglycemia    Relevant Orders    CBC Auto Differential    Comprehensive Metabolic Panel    Hemoglobin A1C    TSH    Urinalysis, Reflex to Urine Culture       Orthopedic    Osteoarthritis of left knee (Chronic)    Relevant Medications    naproxen (NAPROSYN) 500 MG tablet    Gout    Relevant Orders    Uric Acid     Other Visit Diagnoses       Hypokalemia        Relevant Orders    CBC Auto Differential    Comprehensive Metabolic Panel    Hemoglobin A1C    Lipid Panel    TSH    Urinalysis, Reflex to Urine Culture    Acute pain of right knee        Relevant Orders    X-Ray Knee 1 or 2 View Right (Completed)            RTC 6 months for recheck and flu shot. Consider ortho referral or PT referral if pain continues. Use naproxen prn.

## 2023-04-17 DIAGNOSIS — D75.839 THROMBOCYTOSIS: ICD-10-CM

## 2023-04-17 DIAGNOSIS — D64.9 ANEMIA, UNSPECIFIED TYPE: Primary | ICD-10-CM

## 2023-04-17 DIAGNOSIS — R73.03 PRE-DIABETES: ICD-10-CM

## 2023-04-30 ENCOUNTER — EXTERNAL CHRONIC CARE MANAGEMENT (OUTPATIENT)
Dept: FAMILY MEDICINE | Facility: CLINIC | Age: 73
End: 2023-04-30
Payer: MEDICARE

## 2023-04-30 PROCEDURE — G0511 PR CHRONIC CARE MGMT, RHC OR FQHC ONLY, 20 MINS OR MORE: ICD-10-PCS | Mod: ,,, | Performed by: FAMILY MEDICINE

## 2023-04-30 PROCEDURE — G0511 CCM/BHI BY RHC/FQHC 20MIN MO: HCPCS | Mod: ,,, | Performed by: FAMILY MEDICINE

## 2023-05-31 ENCOUNTER — EXTERNAL CHRONIC CARE MANAGEMENT (OUTPATIENT)
Dept: FAMILY MEDICINE | Facility: CLINIC | Age: 73
End: 2023-05-31
Payer: MEDICARE

## 2023-05-31 PROCEDURE — G0511 CCM/BHI BY RHC/FQHC 20MIN MO: HCPCS | Mod: ,,, | Performed by: FAMILY MEDICINE

## 2023-05-31 PROCEDURE — G0511 PR CHRONIC CARE MGMT, RHC OR FQHC ONLY, 20 MINS OR MORE: ICD-10-PCS | Mod: ,,, | Performed by: FAMILY MEDICINE

## 2023-06-30 ENCOUNTER — EXTERNAL CHRONIC CARE MANAGEMENT (OUTPATIENT)
Dept: FAMILY MEDICINE | Facility: CLINIC | Age: 73
End: 2023-06-30
Payer: MEDICARE

## 2023-06-30 PROCEDURE — G0511 PR CHRONIC CARE MGMT, RHC OR FQHC ONLY, 20 MINS OR MORE: ICD-10-PCS | Mod: ,,, | Performed by: FAMILY MEDICINE

## 2023-06-30 PROCEDURE — G0511 CCM/BHI BY RHC/FQHC 20MIN MO: HCPCS | Mod: ,,, | Performed by: FAMILY MEDICINE

## 2023-07-31 ENCOUNTER — EXTERNAL CHRONIC CARE MANAGEMENT (OUTPATIENT)
Dept: FAMILY MEDICINE | Facility: CLINIC | Age: 73
End: 2023-07-31
Payer: MEDICARE

## 2023-07-31 PROCEDURE — G0511 CCM/BHI BY RHC/FQHC 20MIN MO: HCPCS | Mod: ,,, | Performed by: FAMILY MEDICINE

## 2023-07-31 PROCEDURE — G0511 PR CHRONIC CARE MGMT, RHC OR FQHC ONLY, 20 MINS OR MORE: ICD-10-PCS | Mod: ,,, | Performed by: FAMILY MEDICINE

## 2023-08-31 ENCOUNTER — EXTERNAL CHRONIC CARE MANAGEMENT (OUTPATIENT)
Dept: FAMILY MEDICINE | Facility: CLINIC | Age: 73
End: 2023-08-31
Payer: MEDICARE

## 2023-08-31 PROCEDURE — G0511 PR CHRONIC CARE MGMT, RHC OR FQHC ONLY, 20 MINS OR MORE: ICD-10-PCS | Mod: ,,, | Performed by: FAMILY MEDICINE

## 2023-08-31 PROCEDURE — G0511 CCM/BHI BY RHC/FQHC 20MIN MO: HCPCS | Mod: ,,, | Performed by: FAMILY MEDICINE

## 2023-09-07 LAB — BCS RECOMMENDATION EXT: NORMAL

## 2023-09-11 DIAGNOSIS — I10 ESSENTIAL HYPERTENSION: Chronic | ICD-10-CM

## 2023-09-12 RX ORDER — LOSARTAN POTASSIUM 100 MG/1
100 TABLET ORAL
Qty: 90 TABLET | Refills: 3 | Status: SHIPPED | OUTPATIENT
Start: 2023-09-12

## 2023-09-12 RX ORDER — AMLODIPINE BESYLATE 5 MG/1
5 TABLET ORAL
Qty: 90 TABLET | Refills: 3 | Status: SHIPPED | OUTPATIENT
Start: 2023-09-12

## 2023-09-30 ENCOUNTER — EXTERNAL CHRONIC CARE MANAGEMENT (OUTPATIENT)
Dept: FAMILY MEDICINE | Facility: CLINIC | Age: 73
End: 2023-09-30
Payer: MEDICARE

## 2023-09-30 PROCEDURE — G0511 PR CHRONIC CARE MGMT, RHC OR FQHC ONLY, 20 MINS OR MORE: ICD-10-PCS | Mod: ,,, | Performed by: FAMILY MEDICINE

## 2023-09-30 PROCEDURE — G0511 CCM/BHI BY RHC/FQHC 20MIN MO: HCPCS | Mod: ,,, | Performed by: FAMILY MEDICINE

## 2023-10-06 ENCOUNTER — OFFICE VISIT (OUTPATIENT)
Dept: FAMILY MEDICINE | Facility: CLINIC | Age: 73
End: 2023-10-06
Payer: MEDICARE

## 2023-10-06 ENCOUNTER — PATIENT OUTREACH (OUTPATIENT)
Dept: ADMINISTRATIVE | Facility: HOSPITAL | Age: 73
End: 2023-10-06

## 2023-10-06 VITALS
WEIGHT: 251 LBS | DIASTOLIC BLOOD PRESSURE: 98 MMHG | SYSTOLIC BLOOD PRESSURE: 152 MMHG | OXYGEN SATURATION: 98 % | TEMPERATURE: 98 F | RESPIRATION RATE: 18 BRPM | BODY MASS INDEX: 41.82 KG/M2 | HEART RATE: 68 BPM | HEIGHT: 65 IN

## 2023-10-06 DIAGNOSIS — J30.9 ALLERGIC RHINITIS, UNSPECIFIED SEASONALITY, UNSPECIFIED TRIGGER: ICD-10-CM

## 2023-10-06 DIAGNOSIS — E87.6 HYPOKALEMIA: ICD-10-CM

## 2023-10-06 DIAGNOSIS — R73.03 PRE-DIABETES: Chronic | ICD-10-CM

## 2023-10-06 DIAGNOSIS — M10.9 GOUT, UNSPECIFIED CAUSE, UNSPECIFIED CHRONICITY, UNSPECIFIED SITE: Chronic | ICD-10-CM

## 2023-10-06 DIAGNOSIS — D64.9 ANEMIA, UNSPECIFIED TYPE: Chronic | ICD-10-CM

## 2023-10-06 DIAGNOSIS — N18.31 CHRONIC KIDNEY DISEASE, STAGE 3A: Chronic | ICD-10-CM

## 2023-10-06 DIAGNOSIS — I10 ESSENTIAL HYPERTENSION: Chronic | ICD-10-CM

## 2023-10-06 DIAGNOSIS — D75.839 THROMBOCYTOSIS: Chronic | ICD-10-CM

## 2023-10-06 DIAGNOSIS — B37.2 CANDIDAL INTERTRIGO: ICD-10-CM

## 2023-10-06 DIAGNOSIS — Z11.59 NEED FOR HEPATITIS C SCREENING TEST: ICD-10-CM

## 2023-10-06 DIAGNOSIS — Z23 NEED FOR VACCINATION: ICD-10-CM

## 2023-10-06 DIAGNOSIS — G47.09 OTHER INSOMNIA: Primary | Chronic | ICD-10-CM

## 2023-10-06 LAB
ANION GAP SERPL CALCULATED.3IONS-SCNC: 12 MMOL/L (ref 7–16)
BASOPHILS # BLD AUTO: 0.06 K/UL (ref 0–0.2)
BASOPHILS NFR BLD AUTO: 0.7 % (ref 0–1)
BUN SERPL-MCNC: 16 MG/DL (ref 7–18)
BUN/CREAT SERPL: 16 (ref 6–20)
CALCIUM SERPL-MCNC: 9.5 MG/DL (ref 8.5–10.1)
CHLORIDE SERPL-SCNC: 105 MMOL/L (ref 98–107)
CO2 SERPL-SCNC: 27 MMOL/L (ref 21–32)
CREAT SERPL-MCNC: 1.03 MG/DL (ref 0.55–1.02)
DIFFERENTIAL METHOD BLD: ABNORMAL
EGFR (NO RACE VARIABLE) (RUSH/TITUS): 58 ML/MIN/1.73M2
EOSINOPHIL # BLD AUTO: 0.4 K/UL (ref 0–0.5)
EOSINOPHIL NFR BLD AUTO: 4.9 % (ref 1–4)
ERYTHROCYTE [DISTWIDTH] IN BLOOD BY AUTOMATED COUNT: 16.3 % (ref 11.5–14.5)
EST. AVERAGE GLUCOSE BLD GHB EST-MCNC: 127 MG/DL
GLUCOSE SERPL-MCNC: 117 MG/DL (ref 74–106)
HBA1C MFR BLD HPLC: 6.4 % (ref 4.5–6.6)
HCT VFR BLD AUTO: 36.9 % (ref 38–47)
HCV AB SER QL: NORMAL
HGB BLD-MCNC: 11.9 G/DL (ref 12–16)
IMM GRANULOCYTES # BLD AUTO: 0.03 K/UL (ref 0–0.04)
IMM GRANULOCYTES NFR BLD: 0.4 % (ref 0–0.4)
LYMPHOCYTES # BLD AUTO: 1.82 K/UL (ref 1–4.8)
LYMPHOCYTES NFR BLD AUTO: 22.4 % (ref 27–41)
MAGNESIUM SERPL-MCNC: 2.2 MG/DL (ref 1.7–2.3)
MCH RBC QN AUTO: 28.4 PG (ref 27–31)
MCHC RBC AUTO-ENTMCNC: 32.2 G/DL (ref 32–36)
MCV RBC AUTO: 88.1 FL (ref 80–96)
MONOCYTES # BLD AUTO: 0.89 K/UL (ref 0–0.8)
MONOCYTES NFR BLD AUTO: 10.9 % (ref 2–6)
MPC BLD CALC-MCNC: 9.5 FL (ref 9.4–12.4)
NEUTROPHILS # BLD AUTO: 4.93 K/UL (ref 1.8–7.7)
NEUTROPHILS NFR BLD AUTO: 60.7 % (ref 53–65)
NRBC # BLD AUTO: 0 X10E3/UL
NRBC, AUTO (.00): 0 %
PLATELET # BLD AUTO: 614 K/UL (ref 150–400)
POTASSIUM SERPL-SCNC: 3.8 MMOL/L (ref 3.5–5.1)
RBC # BLD AUTO: 4.19 M/UL (ref 4.2–5.4)
SODIUM SERPL-SCNC: 140 MMOL/L (ref 136–145)
WBC # BLD AUTO: 8.13 K/UL (ref 4.5–11)

## 2023-10-06 PROCEDURE — 85025 CBC WITH DIFFERENTIAL: ICD-10-PCS | Mod: ,,, | Performed by: CLINICAL MEDICAL LABORATORY

## 2023-10-06 PROCEDURE — 99214 PR OFFICE/OUTPT VISIT, EST, LEVL IV, 30-39 MIN: ICD-10-PCS | Mod: ,,, | Performed by: FAMILY MEDICINE

## 2023-10-06 PROCEDURE — G0008 FLU VACCINE - QUADRIVALENT - ADJUVANTED: ICD-10-PCS | Mod: ,,, | Performed by: FAMILY MEDICINE

## 2023-10-06 PROCEDURE — 83735 ASSAY OF MAGNESIUM: CPT | Mod: ,,, | Performed by: CLINICAL MEDICAL LABORATORY

## 2023-10-06 PROCEDURE — 80048 BASIC METABOLIC PANEL: ICD-10-PCS | Mod: ,,, | Performed by: CLINICAL MEDICAL LABORATORY

## 2023-10-06 PROCEDURE — 90694 FLU VACCINE - QUADRIVALENT - ADJUVANTED: ICD-10-PCS | Mod: ,,, | Performed by: FAMILY MEDICINE

## 2023-10-06 PROCEDURE — 83036 HEMOGLOBIN GLYCOSYLATED A1C: CPT | Mod: ,,, | Performed by: CLINICAL MEDICAL LABORATORY

## 2023-10-06 PROCEDURE — 86803 HEPATITIS C AB TEST: CPT | Mod: ,,, | Performed by: CLINICAL MEDICAL LABORATORY

## 2023-10-06 PROCEDURE — 80048 BASIC METABOLIC PNL TOTAL CA: CPT | Mod: ,,, | Performed by: CLINICAL MEDICAL LABORATORY

## 2023-10-06 PROCEDURE — 85025 COMPLETE CBC W/AUTO DIFF WBC: CPT | Mod: ,,, | Performed by: CLINICAL MEDICAL LABORATORY

## 2023-10-06 PROCEDURE — G0008 ADMIN INFLUENZA VIRUS VAC: HCPCS | Mod: ,,, | Performed by: FAMILY MEDICINE

## 2023-10-06 PROCEDURE — 90694 VACC AIIV4 NO PRSRV 0.5ML IM: CPT | Mod: ,,, | Performed by: FAMILY MEDICINE

## 2023-10-06 PROCEDURE — 83735 MAGNESIUM: ICD-10-PCS | Mod: ,,, | Performed by: CLINICAL MEDICAL LABORATORY

## 2023-10-06 PROCEDURE — 99214 OFFICE O/P EST MOD 30 MIN: CPT | Mod: ,,, | Performed by: FAMILY MEDICINE

## 2023-10-06 PROCEDURE — 83036 HEMOGLOBIN A1C: ICD-10-PCS | Mod: ,,, | Performed by: CLINICAL MEDICAL LABORATORY

## 2023-10-06 PROCEDURE — 86803 HEPATITIS C ANTIBODY: ICD-10-PCS | Mod: ,,, | Performed by: CLINICAL MEDICAL LABORATORY

## 2023-10-06 RX ORDER — POTASSIUM CHLORIDE 750 MG/1
10 TABLET, EXTENDED RELEASE ORAL 2 TIMES DAILY
Qty: 180 TABLET | Refills: 3 | Status: SHIPPED | OUTPATIENT
Start: 2023-10-06

## 2023-10-06 RX ORDER — SPIRONOLACTONE 25 MG/1
25 TABLET ORAL DAILY
Qty: 90 TABLET | Refills: 3 | Status: SHIPPED | OUTPATIENT
Start: 2023-10-06 | End: 2024-10-05

## 2023-10-06 RX ORDER — ALLOPURINOL 100 MG/1
200 TABLET ORAL DAILY
Qty: 180 TABLET | Refills: 3 | Status: SHIPPED | OUTPATIENT
Start: 2023-10-06 | End: 2023-11-30

## 2023-10-06 RX ORDER — FLUCONAZOLE 200 MG/1
TABLET ORAL
Qty: 10 TABLET | Refills: 5 | Status: SHIPPED | OUTPATIENT
Start: 2023-10-06

## 2023-10-06 RX ORDER — ZOLPIDEM TARTRATE 10 MG/1
10 TABLET ORAL NIGHTLY PRN
Qty: 90 TABLET | Refills: 1 | Status: SHIPPED | OUTPATIENT
Start: 2023-10-06 | End: 2024-04-05

## 2023-10-06 RX ORDER — MONTELUKAST SODIUM 10 MG/1
10 TABLET ORAL DAILY
Qty: 90 TABLET | Refills: 3 | Status: SHIPPED | OUTPATIENT
Start: 2023-10-06 | End: 2024-01-29

## 2023-10-06 NOTE — LETTER
AUTHORIZATION FOR RELEASE OF   CONFIDENTIAL INFORMATION    Dear [facility],    We are seeing Janay Reveles, date of birth 1950, in the clinic at [facility]. Charo Blankenship MD is the patient's PCP. Janay Reveles has an outstanding lab/procedure at the time we reviewed her chart. In order to help keep her health information updated, she has authorized us to request the following medical record(s):        ( x )  MAMMOGRAM                                      (  )  COLONOSCOPY      (  )  PAP SMEAR                                          (  )  OUTSIDE LAB RESULTS     (  )  DEXA SCAN                                          (  )  EYE EXAM            (  )  FOOT EXAM                                          (  )  ENTIRE RECORD     (  )  OUTSIDE IMMUNIZATIONS                 (  )  _______________         Please fax records to Duane Sanders LPN Care Coordinator at [phone].      If you have any questions, please contact Duane at [phone].           Patient Name: Janay Reveles  : 1950  Patient Phone #: [phone]

## 2023-10-06 NOTE — PROGRESS NOTES
10/06/2023   Chart accessed for: Breast Cancer Screening  Outreach made to patient via: Fax      Requested Mammogram from [facility].  Reviewed HAC and MIIX for Vaccines and Screenings. No results found.      Health Maintenance Due   Topic Date Due    Hepatitis C Screening  Never done    Shingles Vaccine (1 of 2) Never done    TETANUS VACCINE  08/13/2022    DEXA Scan  02/27/2023    Mammogram  08/04/2023    COVID-19 Vaccine (4 - 2023-24 season) 09/01/2023

## 2023-10-08 PROBLEM — R73.03 PRE-DIABETES: Chronic | Status: ACTIVE | Noted: 2023-10-08

## 2023-10-09 ENCOUNTER — PATIENT OUTREACH (OUTPATIENT)
Dept: ADMINISTRATIVE | Facility: HOSPITAL | Age: 73
End: 2023-10-09

## 2023-10-09 NOTE — PROGRESS NOTES
10/09/2023   Records Received.  Care Gaps addressed: Breast Cancer Screening  Outreach made to patient via: Chart Review/Record Upload     HM Updated with September 2023 Mammogram. Medical History Updated.

## 2023-10-20 DIAGNOSIS — Z96.652 HISTORY OF TOTAL KNEE ARTHROPLASTY, LEFT: Primary | ICD-10-CM

## 2023-10-24 ENCOUNTER — OFFICE VISIT (OUTPATIENT)
Dept: ORTHOPEDICS | Facility: CLINIC | Age: 73
End: 2023-10-24
Payer: MEDICARE

## 2023-10-24 ENCOUNTER — HOSPITAL ENCOUNTER (OUTPATIENT)
Dept: RADIOLOGY | Facility: HOSPITAL | Age: 73
Discharge: HOME | End: 2023-10-24
Attending: ORTHOPAEDIC SURGERY
Payer: MEDICARE

## 2023-10-24 DIAGNOSIS — M25.561 RIGHT KNEE PAIN, UNSPECIFIED CHRONICITY: ICD-10-CM

## 2023-10-24 DIAGNOSIS — Z96.652 HISTORY OF TOTAL KNEE ARTHROPLASTY, LEFT: ICD-10-CM

## 2023-10-24 DIAGNOSIS — M25.561 RIGHT KNEE PAIN, UNSPECIFIED CHRONICITY: Primary | ICD-10-CM

## 2023-10-24 PROCEDURE — 73562 XR KNEE 3 VIEW LEFT: ICD-10-PCS | Mod: 26,LT,, | Performed by: ORTHOPAEDIC SURGERY

## 2023-10-24 PROCEDURE — 73564 X-RAY EXAM KNEE 4 OR MORE: CPT | Mod: 26,RT,, | Performed by: ORTHOPAEDIC SURGERY

## 2023-10-24 PROCEDURE — 73564 X-RAY EXAM KNEE 4 OR MORE: CPT | Mod: TC,RT

## 2023-10-24 PROCEDURE — 73562 X-RAY EXAM OF KNEE 3: CPT | Mod: TC,LT

## 2023-10-24 PROCEDURE — 99213 OFFICE O/P EST LOW 20 MIN: CPT | Mod: S$PBB,,, | Performed by: ORTHOPAEDIC SURGERY

## 2023-10-24 PROCEDURE — 73564 XR KNEE COMP 4 OR MORE VIEWS RIGHT: ICD-10-PCS | Mod: 26,RT,, | Performed by: ORTHOPAEDIC SURGERY

## 2023-10-24 PROCEDURE — 99213 OFFICE O/P EST LOW 20 MIN: CPT | Mod: PBBFAC | Performed by: ORTHOPAEDIC SURGERY

## 2023-10-24 PROCEDURE — 73562 X-RAY EXAM OF KNEE 3: CPT | Mod: 26,LT,, | Performed by: ORTHOPAEDIC SURGERY

## 2023-10-24 PROCEDURE — 99213 PR OFFICE/OUTPT VISIT, EST, LEVL III, 20-29 MIN: ICD-10-PCS | Mod: S$PBB,,, | Performed by: ORTHOPAEDIC SURGERY

## 2023-10-24 NOTE — PROGRESS NOTES
CC:  Knee pain    73 y.o. Female returns to clinic for a follow up visit regarding knee pain.       Patient is here for here yearly recheck left total, knee. She is doing good, states her right knee has started to give her problems. Dr. Florian fixed this knee several years ago and this did well initially.  Complaining of some clicking and popping in this right knee.       Past Medical History:   Diagnosis Date    Anemia     Breast cancer screening by mammogram 09/07/2023    [facility]    Encounter for long-term (current) use of other medications     Encounter for screening colonoscopy 05/13/2015    Essential hypertension     Goiter     Gout     H/O bone density study 02/27/2020    History of esophagogastroduodenoscopy (EGD) 01/2020    Hypokalemia     Hypothyroidism     Insomnia     Pre-diabetes     Screening for malignant neoplasm of cervix 01/20/2020    Screening mammogram, encounter for 07/16/2021    [facility]ersons/negative exam/scattered fibroglandular densities/recommended anual screening    Screening mammogram, encounter for 06/11/2020     Past Surgical History:   Procedure Laterality Date    HYSTERECTOMY      TUBAL LIGATION           PHYSICAL EXAMINATION:  LMP  (LMP Unknown) Comment: partial hysterectomy  General    Constitutional: She is oriented to person, place, and time. She appears well-developed and well-nourished.   HENT:   Head: Normocephalic and atraumatic.   Eyes: EOM are normal. Pupils are equal, round, and reactive to light.   Neck: Neck supple.   Cardiovascular:  Normal rate, regular rhythm and intact distal pulses.            Pulmonary/Chest: Effort normal and breath sounds normal. No respiratory distress.   Abdominal: There is no abdominal tenderness. There is no guarding.   Neurological: She is alert and oriented to person, place, and time. She has normal reflexes. No cranial nerve deficit. She exhibits normal muscle tone. Coordination normal.   Psychiatric: She has a normal mood and affect. Her  behavior is normal. Judgment and thought content normal.           Right Knee Exam     Tenderness   The patient is tender to palpation of the lateral retinaculum and condyle.    Range of Motion   Extension:  normal   Flexion:  normal     Tests   Meniscus   Ronald:  Medial - negative   Ligament Examination   Lachman: normal (-1 to 2mm)   Pivot Shift: normal (Equal)    Left Knee Exam     Inspection   Erythema: absent  Scars: absent  Swelling: absent  Bruising: absent    Tenderness   The patient tender to palpation of the patella.    Crepitus   The patient has crepitus of the patella.    Range of Motion   Extension:  normal   Flexion:  normal     Tests   Meniscus   Ronald:  Medial - positive   Stability   Lachman: normal (-1 to 2mm)   Patella   Passive Patellar Tilt: lateral tilt  Patellar Tracking: normal  Q-Angle at 90 degrees: normal  Patellar Grind: positive    Other   Sensation: normal    Muscle Strength   Right Lower Extremity   Hip Abduction: 5/5   Quadriceps:  5/5   Hamstrin/5     Vascular Exam       Left Pulses  Dorsalis Pedis:      2+  Posterior Tibial:      2+            IMAGING:  X-Ray Knee Complete 4 Or More Views Right    Result Date: 10/24/2023  See Procedure Notes for results. IMPRESSION: Please see Ortho procedure notes for report.  This procedure was auto-finalized by: Virtual Radiologist  Four views right knee were obtained today demonstrating a stable appearing cemented total knee prosthesis no obvious signs of significant loosening will fracture pathologic bone  X-Ray Knee 3 View Left    Result Date: 10/24/2023  See Procedure Notes for results. IMPRESSION: Please see Ortho procedure notes for report.  This procedure was auto-finalized by: Virtual Radiologist   Three views left knee demonstrate the presence of a stable appearing posterior stabilized cemented total knee component with no signs of loosening  ASSESSMENT:      ICD-10-CM ICD-9-CM   1. Right knee pain, unspecified chronicity   M25.561 719.46   2. History of total knee arthroplasty, left  Z96.652 V43.65       PLAN:     -Findings and treatment options were discussed with the patient  -All questions answered  Natural history and expected course discussed. Questions answered.  Educational materials distributed.  Rest, ice, compression, and elevation (RICE) therapy.  PT referral.  Left knee doing well.  She has some quadriceps deficits present on the right side and pain primarily localized to the patellofemoral compartment I think she would benefit from focused physical therapy on the right knee.  Will follow back up with me on an as-needed basis    There are no Patient Instructions on file for this visit.      Orders Placed This Encounter   Procedures    X-Ray Knee Complete 4 Or More Views Right    Ambulatory referral/consult to Physical/Occupational Therapy         Procedures

## 2023-10-31 ENCOUNTER — EXTERNAL CHRONIC CARE MANAGEMENT (OUTPATIENT)
Dept: FAMILY MEDICINE | Facility: CLINIC | Age: 73
End: 2023-10-31
Payer: MEDICARE

## 2023-10-31 PROCEDURE — G0511 PR CHRONIC CARE MGMT, RHC OR FQHC ONLY, 20 MINS OR MORE: ICD-10-PCS | Mod: ,,, | Performed by: FAMILY MEDICINE

## 2023-10-31 PROCEDURE — G0511 CCM/BHI BY RHC/FQHC 20MIN MO: HCPCS | Mod: ,,, | Performed by: FAMILY MEDICINE

## 2023-11-25 PROBLEM — D75.839 THROMBOCYTOSIS: Chronic | Status: ACTIVE | Noted: 2023-11-25

## 2023-11-25 PROBLEM — N18.31 CHRONIC KIDNEY DISEASE, STAGE 3A: Status: ACTIVE | Noted: 2023-11-25

## 2023-11-25 PROBLEM — G47.09 OTHER INSOMNIA: Chronic | Status: ACTIVE | Noted: 2023-11-25

## 2023-11-25 NOTE — PROGRESS NOTES
Subjective     Patient ID: Janay Reveles is a 73 y.o. female.    Chief Complaint: Follow-up (6 Month check up with lab)    74 yo BF here for check up and lab. Needs ambien. Potassium is often low.     Follow-up  Pertinent negatives include no abdominal pain, chest pain or headaches.     Review of Systems   Constitutional:  Negative for activity change.   Eyes:  Negative for visual disturbance.   Respiratory:  Negative for shortness of breath.    Cardiovascular:  Negative for chest pain.   Gastrointestinal:  Negative for abdominal pain.   Neurological:  Negative for headaches.   Psychiatric/Behavioral:  Negative for dysphoric mood.      Office Visit on 10/06/2023   Component Date Value Ref Range Status    Hemoglobin A1C 10/06/2023 6.4  4.5 - 6.6 % Final      Normal:               <5.7%  Pre-Diabetic:       5.7% to 6.4%  Diabetic:             >6.4%  Diabetic Goal:     <7%    Estimated Average Glucose 10/06/2023 127  mg/dL Final    WBC 10/06/2023 8.13  4.50 - 11.00 K/uL Final    RBC 10/06/2023 4.19 (L)  4.20 - 5.40 M/uL Final    Hemoglobin 10/06/2023 11.9 (L)  12.0 - 16.0 g/dL Final    Hematocrit 10/06/2023 36.9 (L)  38.0 - 47.0 % Final    MCV 10/06/2023 88.1  80.0 - 96.0 fL Final    MCH 10/06/2023 28.4  27.0 - 31.0 pg Final    MCHC 10/06/2023 32.2  32.0 - 36.0 g/dL Final    RDW 10/06/2023 16.3 (H)  11.5 - 14.5 % Final    Platelet Count 10/06/2023 614 (H)  150 - 400 K/uL Final    MPV 10/06/2023 9.5  9.4 - 12.4 fL Final    Neutrophils % 10/06/2023 60.7  53.0 - 65.0 % Final    Lymphocytes % 10/06/2023 22.4 (L)  27.0 - 41.0 % Final    Monocytes % 10/06/2023 10.9 (H)  2.0 - 6.0 % Final    Eosinophils % 10/06/2023 4.9 (H)  1.0 - 4.0 % Final    Basophils % 10/06/2023 0.7  0.0 - 1.0 % Final    Immature Granulocytes % 10/06/2023 0.4  0.0 - 0.4 % Final    nRBC, Auto 10/06/2023 0.0  <=0.0 % Final    Neutrophils, Abs 10/06/2023 4.93  1.80 - 7.70 K/uL Final    Lymphocytes, Absolute 10/06/2023 1.82  1.00 - 4.80 K/uL Final     "Monocytes, Absolute 10/06/2023 0.89 (H)  0.00 - 0.80 K/uL Final    Eosinophils, Absolute 10/06/2023 0.40  0.00 - 0.50 K/uL Final    Basophils, Absolute 10/06/2023 0.06  0.00 - 0.20 K/uL Final    Immature Granulocytes, Absolute 10/06/2023 0.03  0.00 - 0.04 K/uL Final    nRBC, Absolute 10/06/2023 0.00  <=0.00 x10e3/uL Final    Diff Type 10/06/2023 Auto   Final    Hepatitis C Ab 10/06/2023 Non-Reactive  Non-Reactive Final    Sodium 10/06/2023 140  136 - 145 mmol/L Final    Potassium 10/06/2023 3.8  3.5 - 5.1 mmol/L Final    Chloride 10/06/2023 105  98 - 107 mmol/L Final    CO2 10/06/2023 27  21 - 32 mmol/L Final    Anion Gap 10/06/2023 12  7 - 16 mmol/L Final    Glucose 10/06/2023 117 (H)  74 - 106 mg/dL Final    BUN 10/06/2023 16  7 - 18 mg/dL Final    Creatinine 10/06/2023 1.03 (H)  0.55 - 1.02 mg/dL Final    BUN/Creatinine Ratio 10/06/2023 16  6 - 20 Final    Calcium 10/06/2023 9.5  8.5 - 10.1 mg/dL Final    eGFR 10/06/2023 58 (L)  >=60 mL/min/1.73m2 Final    Magnesium 10/06/2023 2.2  1.7 - 2.3 mg/dL Final          Objective   Blood pressure (!) 152/98, pulse 68, temperature 98.4 °F (36.9 °C), temperature source Oral, resp. rate 18, height 5' 5" (1.651 m), weight 113.9 kg (251 lb), SpO2 98 %.    Physical Exam  Constitutional:       Appearance: Normal appearance.   HENT:      Head: Normocephalic and atraumatic.      Right Ear: External ear normal.      Left Ear: External ear normal.      Nose: Nose normal.      Mouth/Throat:      Mouth: Mucous membranes are moist.   Eyes:      Conjunctiva/sclera: Conjunctivae normal.      Pupils: Pupils are equal, round, and reactive to light.   Cardiovascular:      Rate and Rhythm: Normal rate and regular rhythm.      Pulses: Normal pulses.      Heart sounds: Normal heart sounds.   Pulmonary:      Effort: Pulmonary effort is normal.      Breath sounds: Normal breath sounds.   Abdominal:      General: Abdomen is flat.      Palpations: Abdomen is soft.   Musculoskeletal:         " General: Normal range of motion.      Cervical back: Normal range of motion and neck supple.   Skin:     General: Skin is warm and dry.   Neurological:      General: No focal deficit present.      Mental Status: She is alert and oriented to person, place, and time.   Psychiatric:         Mood and Affect: Mood normal.         Behavior: Behavior normal.         Thought Content: Thought content normal.         Judgment: Judgment normal.            Assessment and Plan     1. Other insomnia  -     zolpidem (AMBIEN) 10 mg Tab; Take 1 tablet (10 mg total) by mouth nightly as needed (insomnia).  Dispense: 90 tablet; Refill: 1    2. Pre-diabetes  -     Hemoglobin A1C    3. Anemia, unspecified type  -     CBC Auto Differential    4. Thrombocytosis  -     CBC Auto Differential    5. Need for vaccination  -     Influenza (FLUAD) - Quadrivalent (Adjuvanted) *Preferred* (65+) (PF)    6. Need for hepatitis C screening test  -     Hepatitis C Antibody; Future    7. Gout, unspecified cause, unspecified chronicity, unspecified site  -     allopurinoL (ZYLOPRIM) 100 MG tablet; Take 2 tablets (200 mg total) by mouth once daily.  Dispense: 180 tablet; Refill: 3    8. Hypokalemia  -     potassium chloride (KLOR-CON) 10 MEQ TbSR; Take 1 tablet (10 mEq total) by mouth 2 (two) times daily.  Dispense: 180 tablet; Refill: 3    9. Allergic rhinitis, unspecified seasonality, unspecified trigger  -     montelukast (SINGULAIR) 10 mg tablet; Take 1 tablet (10 mg total) by mouth once daily.  Dispense: 90 tablet; Refill: 3    10. Essential hypertension  -     spironolactone (ALDACTONE) 25 MG tablet; Take 1 tablet (25 mg total) by mouth once daily.  Dispense: 90 tablet; Refill: 3  -     Basic Metabolic Panel; Future; Expected date: 10/06/2023  -     Magnesium; Future; Expected date: 10/06/2023  -     Basic Metabolic Panel; Future; Expected date: 12/06/2023    11. Candidal intertrigo  -     fluconazole (DIFLUCAN) 200 MG Tab; Take 1 tablet every 3 days  as needed for yeast infection symptoms.  Dispense: 10 tablet; Refill: 5    12. Chronic kidney disease, stage 3a        Will change the HCTZ to aldactone and cut potassium back to two a day.          Follow up in about 6 months (around 4/6/2024), or and recheck in 6 weeks with BMP, for for check up and all lab and urine fasting..

## 2023-11-30 DIAGNOSIS — M10.9 GOUT, UNSPECIFIED CAUSE, UNSPECIFIED CHRONICITY, UNSPECIFIED SITE: Chronic | ICD-10-CM

## 2023-11-30 RX ORDER — ALLOPURINOL 100 MG/1
200 TABLET ORAL
Qty: 180 TABLET | Refills: 3 | Status: SHIPPED | OUTPATIENT
Start: 2023-11-30

## 2023-12-06 ENCOUNTER — OFFICE VISIT (OUTPATIENT)
Dept: FAMILY MEDICINE | Facility: CLINIC | Age: 73
End: 2023-12-06
Payer: MEDICARE

## 2023-12-06 VITALS
WEIGHT: 247 LBS | DIASTOLIC BLOOD PRESSURE: 85 MMHG | RESPIRATION RATE: 20 BRPM | HEART RATE: 72 BPM | SYSTOLIC BLOOD PRESSURE: 138 MMHG | TEMPERATURE: 98 F | BODY MASS INDEX: 41.1 KG/M2 | OXYGEN SATURATION: 99 %

## 2023-12-06 DIAGNOSIS — E03.9 HYPOTHYROIDISM, UNSPECIFIED TYPE: Chronic | ICD-10-CM

## 2023-12-06 DIAGNOSIS — I10 ESSENTIAL HYPERTENSION: Primary | Chronic | ICD-10-CM

## 2023-12-06 DIAGNOSIS — R73.03 PRE-DIABETES: Chronic | ICD-10-CM

## 2023-12-06 DIAGNOSIS — N18.31 CHRONIC KIDNEY DISEASE, STAGE 3A: Chronic | ICD-10-CM

## 2023-12-06 DIAGNOSIS — D64.9 ANEMIA, UNSPECIFIED TYPE: Chronic | ICD-10-CM

## 2023-12-06 DIAGNOSIS — E55.9 VITAMIN D DEFICIENCY: Chronic | ICD-10-CM

## 2023-12-06 LAB
ANION GAP SERPL CALCULATED.3IONS-SCNC: 8 MMOL/L (ref 7–16)
BUN SERPL-MCNC: 11 MG/DL (ref 7–18)
BUN/CREAT SERPL: 12 (ref 6–20)
CALCIUM SERPL-MCNC: 9.4 MG/DL (ref 8.5–10.1)
CHLORIDE SERPL-SCNC: 109 MMOL/L (ref 98–107)
CO2 SERPL-SCNC: 27 MMOL/L (ref 21–32)
CREAT SERPL-MCNC: 0.9 MG/DL (ref 0.55–1.02)
EGFR (NO RACE VARIABLE) (RUSH/TITUS): 67 ML/MIN/1.73M2
GLUCOSE SERPL-MCNC: 114 MG/DL (ref 74–106)
POTASSIUM SERPL-SCNC: 3.7 MMOL/L (ref 3.5–5.1)
SODIUM SERPL-SCNC: 140 MMOL/L (ref 136–145)

## 2023-12-06 PROCEDURE — 99214 PR OFFICE/OUTPT VISIT, EST, LEVL IV, 30-39 MIN: ICD-10-PCS | Mod: ,,, | Performed by: FAMILY MEDICINE

## 2023-12-06 PROCEDURE — 99214 OFFICE O/P EST MOD 30 MIN: CPT | Mod: ,,, | Performed by: FAMILY MEDICINE

## 2023-12-06 PROCEDURE — 80048 BASIC METABOLIC PNL TOTAL CA: CPT | Mod: ,,, | Performed by: CLINICAL MEDICAL LABORATORY

## 2023-12-06 PROCEDURE — 80048 BASIC METABOLIC PANEL: ICD-10-PCS | Mod: ,,, | Performed by: CLINICAL MEDICAL LABORATORY

## 2023-12-06 NOTE — PROGRESS NOTES
Subjective     Patient ID: Janay Reveles is a 73 y.o. female.    Chief Complaint: Hypertension and Follow-up (6 week follow up on hypertension)    72 yo BF here for recheck on BP and lab after stopping the HCTZ and change to aldactone instead with half as much potassium. BP seems ok.       Review of Systems   Constitutional:  Negative for activity change.   Eyes:  Negative for visual disturbance.   Respiratory:  Negative for shortness of breath.    Cardiovascular:  Negative for chest pain.   Gastrointestinal:  Negative for abdominal pain.   Neurological:  Negative for headaches.   Psychiatric/Behavioral:  Negative for dysphoric mood.      Office Visit on 12/06/2023   Component Date Value Ref Range Status    Sodium 12/06/2023 140  136 - 145 mmol/L Final    Potassium 12/06/2023 3.7  3.5 - 5.1 mmol/L Final    Chloride 12/06/2023 109 (H)  98 - 107 mmol/L Final    CO2 12/06/2023 27  21 - 32 mmol/L Final    Anion Gap 12/06/2023 8  7 - 16 mmol/L Final    Glucose 12/06/2023 114 (H)  74 - 106 mg/dL Final    BUN 12/06/2023 11  7 - 18 mg/dL Final    Creatinine 12/06/2023 0.90  0.55 - 1.02 mg/dL Final    BUN/Creatinine Ratio 12/06/2023 12  6 - 20 Final    Calcium 12/06/2023 9.4  8.5 - 10.1 mg/dL Final    eGFR 12/06/2023 67  >=60 mL/min/1.73m2 Final          Objective   Blood pressure 138/85, pulse 72, temperature 98.2 °F (36.8 °C), temperature source Oral, resp. rate 20, weight 112 kg (247 lb), SpO2 99 %.    Physical Exam  Constitutional:       Appearance: Normal appearance.   HENT:      Head: Normocephalic and atraumatic.      Nose: Nose normal.      Mouth/Throat:      Mouth: Mucous membranes are moist.   Eyes:      Pupils: Pupils are equal, round, and reactive to light.   Cardiovascular:      Rate and Rhythm: Normal rate and regular rhythm.      Pulses: Normal pulses.   Pulmonary:      Effort: Pulmonary effort is normal.   Abdominal:      General: Abdomen is flat.   Skin:     General: Skin is warm and dry.   Neurological:       General: No focal deficit present.      Mental Status: She is alert and oriented to person, place, and time.   Psychiatric:         Mood and Affect: Mood normal.         Behavior: Behavior normal.            Assessment and Plan     1. Essential hypertension  -     Basic Metabolic Panel  -     Lipid Panel; Future; Expected date: 04/05/2024  -     CBC Auto Differential; Future; Expected date: 04/05/2024  -     Comprehensive Metabolic Panel; Future; Expected date: 04/05/2024  -     TSH; Future; Expected date: 04/05/2024  -     Urinalysis, Reflex to Urine Culture; Future; Expected date: 04/05/2024    2. Pre-diabetes  -     Hemoglobin A1C; Future; Expected date: 04/05/2024    3. Anemia, unspecified type  -     Lipid Panel; Future; Expected date: 04/05/2024  -     CBC Auto Differential; Future; Expected date: 04/05/2024  -     Comprehensive Metabolic Panel; Future; Expected date: 04/05/2024  -     TSH; Future; Expected date: 04/05/2024  -     Urinalysis, Reflex to Urine Culture; Future; Expected date: 04/05/2024    4. Vitamin D deficiency  -     Vitamin D; Future; Expected date: 04/05/2024    5. Hypothyroidism, unspecified type  -     Lipid Panel; Future; Expected date: 04/05/2024  -     CBC Auto Differential; Future; Expected date: 04/05/2024  -     Comprehensive Metabolic Panel; Future; Expected date: 04/05/2024  -     TSH; Future; Expected date: 04/05/2024  -     Urinalysis, Reflex to Urine Culture; Future; Expected date: 04/05/2024    6. Chronic kidney disease, stage 3a  -     Lipid Panel; Future; Expected date: 04/05/2024  -     CBC Auto Differential; Future; Expected date: 04/05/2024  -     Comprehensive Metabolic Panel; Future; Expected date: 04/05/2024  -     TSH; Future; Expected date: 04/05/2024  -     Urinalysis, Reflex to Urine Culture; Future; Expected date: 04/05/2024        Rtc prn.          Follow up in about 4 months (around 4/5/2024) for for check up as already planned and all lab fasting and urine.  .

## 2023-12-14 ENCOUNTER — APPOINTMENT (OUTPATIENT)
Dept: RADIOLOGY | Facility: CLINIC | Age: 73
End: 2023-12-14
Attending: FAMILY MEDICINE
Payer: MEDICARE

## 2023-12-14 ENCOUNTER — OFFICE VISIT (OUTPATIENT)
Dept: FAMILY MEDICINE | Facility: CLINIC | Age: 73
End: 2023-12-14
Payer: MEDICARE

## 2023-12-14 VITALS
BODY MASS INDEX: 41.32 KG/M2 | HEART RATE: 76 BPM | WEIGHT: 248 LBS | HEIGHT: 65 IN | OXYGEN SATURATION: 95 % | DIASTOLIC BLOOD PRESSURE: 84 MMHG | RESPIRATION RATE: 18 BRPM | TEMPERATURE: 98 F | SYSTOLIC BLOOD PRESSURE: 146 MMHG

## 2023-12-14 DIAGNOSIS — M25.559 HIP PAIN, UNSPECIFIED LATERALITY: ICD-10-CM

## 2023-12-14 DIAGNOSIS — M25.551 PAIN OF RIGHT HIP: Primary | ICD-10-CM

## 2023-12-14 PROCEDURE — 73502 X-RAY EXAM HIP UNI 2-3 VIEWS: CPT | Mod: 26,RT,, | Performed by: RADIOLOGY

## 2023-12-14 PROCEDURE — 73502 X-RAY EXAM HIP UNI 2-3 VIEWS: CPT | Mod: TC,RHCUB,RT | Performed by: FAMILY MEDICINE

## 2023-12-14 PROCEDURE — 73502 XR HIP WITH PELVIS WHEN PERFORMED, 2 OR 3  VIEWS RIGHT: ICD-10-PCS | Mod: 26,RT,, | Performed by: RADIOLOGY

## 2023-12-14 PROCEDURE — 99213 OFFICE O/P EST LOW 20 MIN: CPT | Mod: ,,, | Performed by: FAMILY MEDICINE

## 2023-12-14 PROCEDURE — 96372 THER/PROPH/DIAG INJ SC/IM: CPT | Mod: ,,, | Performed by: FAMILY MEDICINE

## 2023-12-14 PROCEDURE — 96372 PR INJECTION,THERAP/PROPH/DIAG2ST, IM OR SUBCUT: ICD-10-PCS | Mod: ,,, | Performed by: FAMILY MEDICINE

## 2023-12-14 PROCEDURE — 99213 PR OFFICE/OUTPT VISIT, EST, LEVL III, 20-29 MIN: ICD-10-PCS | Mod: ,,, | Performed by: FAMILY MEDICINE

## 2023-12-14 RX ORDER — IBUPROFEN 200 MG
200 TABLET ORAL EVERY 6 HOURS PRN
COMMUNITY

## 2023-12-14 RX ORDER — METHYLPREDNISOLONE ACETATE 80 MG/ML
80 INJECTION, SUSPENSION INTRA-ARTICULAR; INTRALESIONAL; INTRAMUSCULAR; SOFT TISSUE ONCE
Status: COMPLETED | OUTPATIENT
Start: 2023-12-14 | End: 2023-12-14

## 2023-12-14 RX ADMIN — METHYLPREDNISOLONE ACETATE 80 MG: 80 INJECTION, SUSPENSION INTRA-ARTICULAR; INTRALESIONAL; INTRAMUSCULAR; SOFT TISSUE at 05:12

## 2023-12-14 NOTE — PROGRESS NOTES
"Subjective     Patient ID: Janay Reveles is a 73 y.o. female.    Chief Complaint: Hip Pain (Right hip pain, started Sunday and has continued to get worse.   Ibuprofen has helped relieve the pain. )    74 yo BF here with complaints of right hip pain for several days. Advil helped. Worse with walking and rising out of chair. Better with rest.     Hip Pain       Review of Systems   Constitutional:  Negative for activity change.   Eyes:  Negative for visual disturbance.   Respiratory:  Negative for shortness of breath.    Cardiovascular:  Negative for chest pain.   Gastrointestinal:  Negative for abdominal pain.   Musculoskeletal:  Positive for leg pain.   Neurological:  Negative for headaches.   Psychiatric/Behavioral:  Negative for dysphoric mood.      X-ray or right hip shows mild arthritis.      Objective   Blood pressure (!) 146/84, pulse 76, temperature 98.2 °F (36.8 °C), temperature source Oral, resp. rate 18, height 5' 5" (1.651 m), weight 112.5 kg (248 lb), SpO2 95 %.    Physical Exam  Constitutional:       Appearance: Normal appearance.   HENT:      Head: Normocephalic and atraumatic.      Nose: Nose normal.      Mouth/Throat:      Mouth: Mucous membranes are moist.   Eyes:      Pupils: Pupils are equal, round, and reactive to light.   Cardiovascular:      Rate and Rhythm: Normal rate and regular rhythm.   Pulmonary:      Effort: Pulmonary effort is normal.   Abdominal:      General: Abdomen is flat.   Skin:     General: Skin is warm and dry.   Neurological:      General: No focal deficit present.      Mental Status: She is alert and oriented to person, place, and time.   Psychiatric:         Mood and Affect: Mood normal.         Behavior: Behavior normal.            Assessment and Plan     1. Pain of right hip  -     X-Ray Hip 2 or 3 views Right (with Pelvis when performed); Future; Expected date: 12/14/2023  -     methylPREDNISolone acetate injection 80 mg    2. Hip pain, unspecified " laterality        Consider PT or ortho.          Follow up if symptoms worsen or fail to improve.

## 2024-01-26 DIAGNOSIS — J30.9 ALLERGIC RHINITIS, UNSPECIFIED SEASONALITY, UNSPECIFIED TRIGGER: ICD-10-CM

## 2024-01-29 RX ORDER — MONTELUKAST SODIUM 10 MG/1
10 TABLET ORAL
Qty: 90 TABLET | Refills: 3 | Status: SHIPPED | OUTPATIENT
Start: 2024-01-29

## 2024-02-26 ENCOUNTER — OFFICE VISIT (OUTPATIENT)
Dept: FAMILY MEDICINE | Facility: CLINIC | Age: 74
End: 2024-02-26
Payer: MEDICARE

## 2024-02-26 VITALS
BODY MASS INDEX: 40.95 KG/M2 | WEIGHT: 246.13 LBS | OXYGEN SATURATION: 98 % | TEMPERATURE: 98 F | SYSTOLIC BLOOD PRESSURE: 133 MMHG | RESPIRATION RATE: 20 BRPM | DIASTOLIC BLOOD PRESSURE: 84 MMHG | HEART RATE: 101 BPM

## 2024-02-26 DIAGNOSIS — I83.90 VARICOSE VEINS OF ANKLE: Primary | ICD-10-CM

## 2024-02-26 DIAGNOSIS — I83.91: ICD-10-CM

## 2024-02-26 PROCEDURE — 99212 OFFICE O/P EST SF 10 MIN: CPT | Mod: ,,, | Performed by: NURSE PRACTITIONER

## 2024-02-26 NOTE — PROGRESS NOTES
"Subjective:       Patient ID: Janya Reveles is a 73 y.o. female.    Chief Complaint: CHECK UP (Noticed after shower that blood was coming from right ankle profusely. Called ambulance and was treated at home. EMS staff told patient to follow up with provider.)    Is not currently taking any blood thinners.  Denies trauma or injury to ankle that she is aware. Reports being in shower and noticing "shooting" blood from right ankle.     Active Problem List with Overview Notes    Diagnosis Date Noted    Varicose veins of ankle 02/26/2024    Asymptomatic ruptured varicose vein of right lower extremity 02/26/2024    Chronic kidney disease, stage 3a 11/25/2023    Other insomnia 11/25/2023    Thrombocytosis 11/25/2023    Pre-diabetes 10/08/2023    Hyperglycemia 07/19/2022    Gout 07/18/2022    Morbid obesity 07/18/2022    Bilateral lower extremity edema 07/18/2022    Osteoarthritis of left knee 04/19/2022    Vitamin D deficiency 10/14/2021    Essential hypertension 10/14/2021    Anemia 10/14/2021    Hypothyroidism         Review of Systems   Constitutional:  Negative for fatigue and fever.   HENT:  Negative for congestion, hearing loss, postnasal drip, rhinorrhea, sore throat, tinnitus and voice change.    Respiratory:  Negative for apnea, cough, choking, chest tightness and shortness of breath.    Cardiovascular:  Positive for leg swelling. Negative for chest pain and palpitations.   Gastrointestinal:  Negative for abdominal pain, constipation, diarrhea and nausea.   Genitourinary:  Negative for difficulty urinating.   Skin:  Positive for wound.   Neurological:  Negative for dizziness, syncope, weakness and headaches.   Psychiatric/Behavioral:  Negative for sleep disturbance.         Objective:      Vitals:    02/26/24 1451   BP: 133/84   Pulse: 101   Resp: 20   Temp: 98.2 °F (36.8 °C)      Physical Exam  Constitutional:       Appearance: Normal appearance. She is well-developed and normal weight.   HENT:      Head: " Normocephalic.      Right Ear: External ear normal.      Left Ear: External ear normal.      Nose: Nose normal.      Mouth/Throat:      Lips: Pink.      Mouth: Mucous membranes are moist.      Pharynx: Oropharynx is clear.   Eyes:      General: Lids are normal.      Pupils: Pupils are equal, round, and reactive to light.   Cardiovascular:      Rate and Rhythm: Normal rate and regular rhythm.      Pulses:           Dorsalis pedis pulses are 1+ on the right side and 1+ on the left side.        Posterior tibial pulses are 1+ on the right side and 1+ on the left side.      Heart sounds: Normal heart sounds.   Pulmonary:      Effort: Pulmonary effort is normal.      Breath sounds: Normal breath sounds.   Abdominal:      Palpations: Abdomen is soft.   Musculoskeletal:         General: Normal range of motion.      Cervical back: Normal range of motion.      Right lower leg: Edema present.      Left lower leg: Edema present.      Comments: Varicose veins to right ankle/foot.    Skin:     General: Skin is warm and dry.   Neurological:      Mental Status: She is alert and oriented to person, place, and time.   Psychiatric:         Attention and Perception: Attention normal.         Mood and Affect: Mood normal.         Speech: Speech normal.         Behavior: Behavior normal. Behavior is cooperative.       Assessment:       1. Varicose veins of ankle    2. Asymptomatic ruptured varicose vein of right lower extremity        Plan:   No Nsaids  Refer Dr. Alaniz  Hold pressure x5 mins with any active bleeding if unable to stop bleeding call EMS.   Problem List Items Addressed This Visit          Cardiac/Vascular    Asymptomatic ruptured varicose vein of right lower extremity    Relevant Orders    Ambulatory referral/consult to [facility]       Other    Varicose veins of ankle - Primary    Relevant Orders    Ambulatory referral/consult to [facility]       Health Maintenance:  Health Maintenance Topics with due status:  Not Due       Topic Last Completion Date    Colorectal Cancer Screening 07/17/2019    Lipid Panel 04/13/2023    Mammogram 09/07/2023    Hemoglobin A1c (Prediabetes) 10/06/2023           Milady Constantino   [facility]   2/26/24

## 2024-03-01 ENCOUNTER — TELEPHONE (OUTPATIENT)
Dept: FAMILY MEDICINE | Facility: CLINIC | Age: 74
End: 2024-03-01

## 2024-03-01 NOTE — TELEPHONE ENCOUNTER
Patient said she was in on Monday and that she said you were supposed to be making her an appointment with the vein  @ [place] to give her a call back and that she was just calling back ! But I did go on the appt desk and see that she did have an appointment set up on March 5, 2024 with Darron feliciano @ 11 so I did let patient know !

## 2024-03-05 ENCOUNTER — OFFICE VISIT (OUTPATIENT)
Dept: VASCULAR SURGERY | Facility: CLINIC | Age: 74
End: 2024-03-05
Payer: MEDICARE

## 2024-03-05 ENCOUNTER — HOSPITAL ENCOUNTER (OUTPATIENT)
Dept: RADIOLOGY | Facility: HOSPITAL | Age: 74
Discharge: HOME | End: 2024-03-05
Attending: FAMILY MEDICINE
Payer: MEDICARE

## 2024-03-05 VITALS
DIASTOLIC BLOOD PRESSURE: 60 MMHG | HEART RATE: 72 BPM | BODY MASS INDEX: 40.98 KG/M2 | SYSTOLIC BLOOD PRESSURE: 140 MMHG | WEIGHT: 246 LBS | RESPIRATION RATE: 15 BRPM | HEIGHT: 65 IN

## 2024-03-05 DIAGNOSIS — M79.604 PAIN IN BOTH LOWER EXTREMITIES: Primary | ICD-10-CM

## 2024-03-05 DIAGNOSIS — I83.90 VARICOSE VEINS OF ANKLE: ICD-10-CM

## 2024-03-05 DIAGNOSIS — I87.2 VENOUS INSUFFICIENCY: Primary | ICD-10-CM

## 2024-03-05 DIAGNOSIS — M79.605 PAIN IN BOTH LOWER EXTREMITIES: Primary | ICD-10-CM

## 2024-03-05 DIAGNOSIS — L81.9 HYPERPIGMENTATION OF SKIN: ICD-10-CM

## 2024-03-05 DIAGNOSIS — M79.605 PAIN IN BOTH LOWER EXTREMITIES: ICD-10-CM

## 2024-03-05 DIAGNOSIS — M79.604 PAIN IN BOTH LOWER EXTREMITIES: ICD-10-CM

## 2024-03-05 DIAGNOSIS — M79.604 LEG PAIN, BILATERAL: ICD-10-CM

## 2024-03-05 DIAGNOSIS — I83.91: ICD-10-CM

## 2024-03-05 DIAGNOSIS — I87.2 VENOUS INSUFFICIENCY: ICD-10-CM

## 2024-03-05 DIAGNOSIS — M79.605 LEG PAIN, BILATERAL: ICD-10-CM

## 2024-03-05 PROCEDURE — 99215 OFFICE O/P EST HI 40 MIN: CPT | Mod: PBBFAC,25 | Performed by: FAMILY MEDICINE

## 2024-03-05 PROCEDURE — 93970 EXTREMITY STUDY: CPT | Mod: 26,,, | Performed by: FAMILY MEDICINE

## 2024-03-05 PROCEDURE — 99204 OFFICE O/P NEW MOD 45 MIN: CPT | Mod: S$PBB,,, | Performed by: FAMILY MEDICINE

## 2024-03-05 PROCEDURE — 93970 EXTREMITY STUDY: CPT | Mod: TC

## 2024-03-05 RX ORDER — SODIUM CHLORIDE, SODIUM LACTATE, POTASSIUM CHLORIDE, CALCIUM CHLORIDE 600; 310; 30; 20 MG/100ML; MG/100ML; MG/100ML; MG/100ML
INJECTION, SOLUTION INTRAVENOUS CONTINUOUS
Status: CANCELLED | OUTPATIENT
Start: 2024-04-05

## 2024-03-05 NOTE — PROGRESS NOTES
VEIN CENTER CLINIC NOTE    Patient ID: Janay Reveles is a 73 y.o. female.    I. HISTORY     Chief Complaint:   Chief Complaint   Patient presents with    Leg Swelling     Room 3. NP RF MILADY BOSS. LEG PAIN/SWELLING VV        HPI: Janay Reveles is a 73 y.o. female who is referred here today by Milady Boss FNP for evaluation of bilateral lower extremity swelling, hyperpigmentation and pain.  Symptoms are progressive/worsening and began about 3-4 years ago.  Location is bilateral lower extremities below the knees. Symptoms are worse at the end of the day.  History of venous interventions includes none.  Positive, Grandmother family history of venous disease.  Patient states that she had a rupture of a vein in her left medial ankle one-week ago.  States she held pressure until the bleeding stopped.  No further episodes.  Denies history of CHF, cellulitis or DVT.    The patient underwent a bilateral complete venous reflux study today, 03/05/2024, and the results were discussed with the patient.  This study shows no evidence of DVT bilaterally.  Deep venous reflux noted on the left.  The study also shows dilation and axial reflux of the bilateral great saphenous veins.  Area of pre rupture noted in the right medial Gator region/ankle.  Pathological calf  also noted on the left.    Venous Disease Medical Necessity Documentation Initial Visit Date:3/5/24 Return Check Date:    Have you ever had a rupture or bleed from a varicose vein in your leg(s)?              [] Yes  [x] No   [] Yes   [] No   Have you ever been diagnosed with phlebitis, cellulitis, or inflammation in the area of the varicose veins of  your leg(s)?  [] Yes  [x] No    [] Yes   [] No   Do you have darkened or inflamed skin on your legs?   [x] Yes   [] No   [] Yes   [] No   Do you have leg swelling?     [x] Yes   [] No   [] Yes   [] No   Do you have leg pain?   [x] Yes   [] No   [] Yes   [] No   If yes, describe the type of pain?    [x]    Stabbing [x]  Radiating [x]  Aching   [x]  Tightness []  Throbbing               [x]  Burning [x]  Cramping              Do you have leg discomfort?   [x] Yes   [] No   [] Yes   [] No   If yes, describe the type of discomfort?    [x]  Heaviness [x]  Fullness   [x]  Restlessness [x] Tired/Fatigued [x] Itching              Have you ever worn compression hose?   [] Yes   [x] No   [] Yes   [] No   If yes, how long?           Do you elevate your legs while sitting?   [x] Yes   [] No   [] Yes   [] No   Does venous disease (varicose veins, ulcers, skin changes, leg pain/swelling) interfere with your daily life?  If yes, check activities you are limited or unable to do.    []  Shower  [x]   Walk  []  Exercise  [] Play with children/grandchildren  []  Shop [] Work [x] Stand for any period of time [x] Sleep                               [x] Sitting for an extended period of time.           [x] Yes   [] No   [] Yes   [] No   Do you exercise/have you tried to exercise (i.e.  Walk our participate in a regular exercise routine)?  [] Yes  [x] No   [] Yes   [] No   BMI 40.5             Past Medical History:   Diagnosis Date    Anemia     Breast cancer screening by mammogram 09/07/2023    [facility]    Encounter for long-term (current) use of other medications     Encounter for screening colonoscopy 05/13/2015    Essential hypertension     Goiter     Gout     H/O bone density study 02/27/2020    History of esophagogastroduodenoscopy (EGD) 01/2020    Hypokalemia     Hypothyroidism     Insomnia     Pre-diabetes     Screening for malignant neoplasm of cervix 01/20/2020    Screening mammogram, encounter for 07/16/2021    [facility]/negative exam/scattered fibroglandular densities/recommended anual screening    Screening mammogram, encounter for 06/11/2020        Past Surgical History:   Procedure Laterality Date    HYSTERECTOMY      TUBAL LIGATION         Social History     Tobacco Use   Smoking Status Never    Passive exposure: Never    Smokeless Tobacco Never         Current Outpatient Medications:     albuterol (PROVENTIL/VENTOLIN HFA) 90 mcg/actuation inhaler, Inhale 2 puffs into the lungs every 4 (four) hours as needed for Wheezing. Rescue, Disp: 25.5 g, Rfl: 3    allopurinoL (ZYLOPRIM) 100 MG tablet, TAKE 2 TABLETS BY MOUTH ONCE DAILY, Disp: 180 tablet, Rfl: 3    amLODIPine (NORVASC) 5 MG tablet, Take 1 tablet by mouth once daily, Disp: 90 tablet, Rfl: 3    fluconazole (DIFLUCAN) 200 MG Tab, Take 1 tablet every 3 days as needed for yeast infection symptoms., Disp: 10 tablet, Rfl: 5    ibuprofen (ADVIL,MOTRIN) 200 MG tablet, Take 200 mg by mouth every 6 (six) hours as needed for Pain., Disp: , Rfl:     levothyroxine (SYNTHROID) 75 MCG tablet, TAKE 1 TABLET BY MOUTH EVERY DAY, Disp: 90 tablet, Rfl: 3    losartan (COZAAR) 100 MG tablet, Take 1 tablet by mouth once daily, Disp: 90 tablet, Rfl: 3    montelukast (SINGULAIR) 10 mg tablet, TAKE 1 TABLET BY MOUTH EVERY DAY, Disp: 90 tablet, Rfl: 3    naproxen (NAPROSYN) 500 MG tablet, Take 1 tablet (500 mg total) by mouth 2 (two) times daily as needed (pain)., Disp: 180 tablet, Rfl: 3    potassium chloride (KLOR-CON) 10 MEQ TbSR, Take 1 tablet (10 mEq total) by mouth 2 (two) times daily., Disp: 180 tablet, Rfl: 3    senna-docusate 8.6-50 mg (PERICOLACE) 8.6-50 mg per tablet, Take 1 tablet by mouth 2 (two) times daily., Disp: 60 tablet, Rfl: 2    spironolactone (ALDACTONE) 25 MG tablet, Take 1 tablet (25 mg total) by mouth once daily., Disp: 90 tablet, Rfl: 3    zolpidem (AMBIEN) 10 mg Tab, Take 1 tablet (10 mg total) by mouth nightly as needed (insomnia)., Disp: 90 tablet, Rfl: 1    aspirin 325 MG tablet, Take 1 tablet (325 mg total) by mouth once daily., Disp: 30 tablet, Rfl: 0    Review of Systems   Constitutional:  Negative for activity change, chills, diaphoresis, fatigue and fever.   Respiratory:  Negative for cough and shortness of breath.    Cardiovascular:  Positive for leg swelling. Negative  for chest pain and claudication.        Hyperpigmentation LE   Gastrointestinal:  Negative for nausea and vomiting.   Musculoskeletal:  Positive for leg pain. Negative for joint swelling.   Integumentary:  Negative for rash and wound.   Neurological:  Negative for weakness and numbness.          II. PHYSICAL EXAM     Physical Exam  Constitutional:       General: She is awake. She is not in acute distress.     Appearance: Normal appearance. She is obese. She is not ill-appearing or toxic-appearing.   HENT:      Head: Normocephalic and atraumatic.   Eyes:      Extraocular Movements: Extraocular movements intact.      Conjunctiva/sclera: Conjunctivae normal.      Pupils: Pupils are equal, round, and reactive to light.   Neck:      Vascular: No carotid bruit or JVD.   Cardiovascular:      Rate and Rhythm: Normal rate and regular rhythm.      Pulses:           Dorsalis pedis pulses are detected w/ Doppler on the right side and detected w/ Doppler on the left side.        Posterior tibial pulses are detected w/ Doppler on the right side and detected w/ Doppler on the left side.      Heart sounds: No murmur heard.  Pulmonary:      Effort: Pulmonary effort is normal. No respiratory distress.      Breath sounds: No stridor. No wheezing, rhonchi or rales.   Musculoskeletal:         General: No swelling, tenderness or deformity.      Right lower le+ Edema present.      Left lower le+ Edema present.      Comments: Edema with hyperpigmentation of the bilateral lower extremities without evidence of acute cellulitis or open ulceration.   Feet:      Comments: Triphasic hand-held dopplerable pulses of the bilateral dorsalis pedis and posterior tibial arteries.  Skin:     General: Skin is warm.      Capillary Refill: Capillary refill takes less than 2 seconds.      Coloration: Skin is not ashen.      Findings: No bruising, erythema, lesion, rash or wound.   Neurological:      Mental Status: She is alert and oriented to  person, place, and time.      Motor: No weakness.   Psychiatric:         Speech: Speech normal.         Behavior: Behavior normal. Behavior is cooperative.         Reticular/Spider veins noted:  RLE: medial calf, ankle, and foot  LLE: medial calf, ankle, and foot    Varicose veins noted:  RLE: medial calf and ankle  LLE:  medial calf and ankle    CEAP Classification  Clinical Signs: Class 4 - Skin changes ascribed to venous disease  Etiologic Classification: Primary  Anatomic distribution: Superficial  Pathophysiologic dysfunction: Reflux                       Venous Clinical Severity Score  Pain:2=Daily, moderate activity limitation, occasional analgesics  Varicose Veins: 1=Few, scattered. Branch varicose veins  Venous Edema: 2=Afternoon edema, above ankle  Pigmentation: 2=Diffuse over most of gaiter distribution (lower 1/3) or recent pigmentation (purple)  Inflammation: 0=None  Induration: 0=None  Number of Active Ulcers: 0=0  Active Ulceration, Duration: 0=None  Active Ulcer Size: 0=None  Compressive Therapy: 0=Not used or not compliant  Total Score: 7       III. ASSESSMENT & PLAN (MEDICAL DECISION MAKING)     1. Pain in both lower extremities    2. Varicose veins of ankle    3. Asymptomatic ruptured varicose vein of right lower extremity    4. Hyperpigmentation of skin    5. Leg pain, bilateral    6. Venous insufficiency        Assessment/Diagnosis and Plan:  The patient continues to have sequela of chronic venous insufficiency despite over 3 months of conservative therapy. The patient continues to have life altering symptoms as well as a positive reflux study as noted in the history of present illness above. At this time I believe it would be reasonable to proceed with a right great saphenous vein non thermal adhesive endovenous ablation for symptomatic venous insufficiency.  Untreated venous disease increases the patient's risk for cellulitis, deep vein thrombosis, chronic skin changes and venous ulceration.   Risk and benefits of the procedure were explained to the patient and the patient wishes to proceed. The patient does not have overly distended veins and denies history of DVT/PE and will not require prophylactic anticoagulation.    Patient also noted to have chronic venous insufficiency in her left lower extremity.  We will also start the patient on conservative therapy for chronic venous insufficiency consisting of 20-30 millimeter of mercury compression stockings, calf pumping exercises and therapeutic leg elevation.  We will follow-up on her left lower extremity and 3 months.    Orders Placed This Encounter    US Venous Reflux Study Bilateral          Darron Alaniz DO

## 2024-03-05 NOTE — PATIENT INSTRUCTIONS
Pre Procedure Information    Procedure (s) and Date (s):1. Right GSV VenaSeal Ablation 04/05/2024      We will call you the day prior with your time to report for surgery.  You will check in at [facility].  Shower prior to procedure as your leg will be wrapped for 48 hours and you will not be able to shower.  Do not wear lotion, oil, or cream on you legs on the day of your procedure.  This will cause the Doctor's venkat to fade.  Wear shorts, skirt, or loose pants and larger shoes (house shoes).   Bring a pillow or two and leave in the car (to prop your leg).  Prepare to walk 15 minutes out of each hour for the first 48 hours post op.  Prepare to keep your legs propped up while sitting for the first 48 hours or 2 days following your procedure (recliner, couch, or chair).  Dressings will remain on your legs for at least 48 hours or 2 days after procedure.  Full discharge instructions will be provided on the day of your procedure.    Typically, you are in and out within a few hours.  We will follow you postoperatively with a 4 day post ablation ultrasound and then a 1 month, 3 month, 6 month, and 1 year post ablation visit with the physician or nurse practitioner with or without an ultrasound.  Please make every effort to attend these appointments as they will be essential to following you progress.  Please kindly notify us as soon as possible if you need to reschedule your appointment.  As always, we look forward to caring for you and are happy to answer your questions.  Please call us at [phone].

## 2024-03-21 ENCOUNTER — TELEPHONE (OUTPATIENT)
Dept: VASCULAR SURGERY | Facility: CLINIC | Age: 74
End: 2024-03-21
Payer: MEDICARE

## 2024-04-05 ENCOUNTER — OFFICE VISIT (OUTPATIENT)
Dept: FAMILY MEDICINE | Facility: CLINIC | Age: 74
End: 2024-04-05
Payer: MEDICARE

## 2024-04-05 VITALS
DIASTOLIC BLOOD PRESSURE: 86 MMHG | HEIGHT: 65 IN | OXYGEN SATURATION: 99 % | TEMPERATURE: 99 F | SYSTOLIC BLOOD PRESSURE: 138 MMHG | RESPIRATION RATE: 18 BRPM | HEART RATE: 70 BPM | BODY MASS INDEX: 41.8 KG/M2 | WEIGHT: 250.88 LBS

## 2024-04-05 DIAGNOSIS — N18.31 CHRONIC KIDNEY DISEASE, STAGE 3A: Chronic | ICD-10-CM

## 2024-04-05 DIAGNOSIS — E87.6 HYPOKALEMIA: ICD-10-CM

## 2024-04-05 DIAGNOSIS — E03.9 HYPOTHYROIDISM, UNSPECIFIED TYPE: Chronic | ICD-10-CM

## 2024-04-05 DIAGNOSIS — G47.09 OTHER INSOMNIA: Chronic | ICD-10-CM

## 2024-04-05 DIAGNOSIS — R73.03 PRE-DIABETES: Chronic | ICD-10-CM

## 2024-04-05 DIAGNOSIS — M17.12 OSTEOARTHRITIS OF LEFT KNEE, UNSPECIFIED OSTEOARTHRITIS TYPE: Chronic | ICD-10-CM

## 2024-04-05 DIAGNOSIS — E55.9 VITAMIN D DEFICIENCY: Chronic | ICD-10-CM

## 2024-04-05 DIAGNOSIS — D64.9 ANEMIA, UNSPECIFIED TYPE: ICD-10-CM

## 2024-04-05 DIAGNOSIS — I10 ESSENTIAL HYPERTENSION: Primary | Chronic | ICD-10-CM

## 2024-04-05 DIAGNOSIS — E66.01 MORBID OBESITY: Chronic | ICD-10-CM

## 2024-04-05 LAB
25(OH)D3 SERPL-MCNC: 18.4 NG/ML
ALBUMIN SERPL BCP-MCNC: 3.4 G/DL (ref 3.5–5)
ALBUMIN/GLOB SERPL: 0.7 {RATIO}
ALP SERPL-CCNC: 91 U/L (ref 55–142)
ALT SERPL W P-5'-P-CCNC: 13 U/L (ref 13–56)
ANION GAP SERPL CALCULATED.3IONS-SCNC: 10 MMOL/L (ref 7–16)
AST SERPL W P-5'-P-CCNC: 6 U/L (ref 15–37)
BASOPHILS # BLD AUTO: 0.05 K/UL (ref 0–0.2)
BASOPHILS NFR BLD AUTO: 0.6 % (ref 0–1)
BILIRUB SERPL-MCNC: 0.3 MG/DL (ref ?–1.2)
BILIRUB UR QL STRIP: NEGATIVE
BUN SERPL-MCNC: 17 MG/DL (ref 7–18)
BUN/CREAT SERPL: 19 (ref 6–20)
CALCIUM SERPL-MCNC: 9.5 MG/DL (ref 8.5–10.1)
CHLORIDE SERPL-SCNC: 109 MMOL/L (ref 98–107)
CHOLEST SERPL-MCNC: 140 MG/DL (ref 0–200)
CHOLEST/HDLC SERPL: 2.6 {RATIO}
CLARITY UR: CLEAR
CO2 SERPL-SCNC: 25 MMOL/L (ref 21–32)
COLOR UR: NORMAL
CREAT SERPL-MCNC: 0.91 MG/DL (ref 0.55–1.02)
DIFFERENTIAL METHOD BLD: ABNORMAL
EGFR (NO RACE VARIABLE) (RUSH/TITUS): 66 ML/MIN/1.73M2
EOSINOPHIL # BLD AUTO: 0.23 K/UL (ref 0–0.5)
EOSINOPHIL NFR BLD AUTO: 2.7 % (ref 1–4)
ERYTHROCYTE [DISTWIDTH] IN BLOOD BY AUTOMATED COUNT: 15.9 % (ref 11.5–14.5)
EST. AVERAGE GLUCOSE BLD GHB EST-MCNC: 117 MG/DL
GLOBULIN SER-MCNC: 5.1 G/DL (ref 2–4)
GLUCOSE SERPL-MCNC: 115 MG/DL (ref 74–106)
GLUCOSE UR STRIP-MCNC: NORMAL MG/DL
HBA1C MFR BLD HPLC: 5.7 % (ref 4.5–6.6)
HCT VFR BLD AUTO: 34.8 % (ref 38–47)
HDLC SERPL-MCNC: 54 MG/DL (ref 40–60)
HGB BLD-MCNC: 10.8 G/DL (ref 12–16)
IMM GRANULOCYTES # BLD AUTO: 0.04 K/UL (ref 0–0.04)
IMM GRANULOCYTES NFR BLD: 0.5 % (ref 0–0.4)
KETONES UR STRIP-SCNC: NEGATIVE MG/DL
LDLC SERPL CALC-MCNC: 77 MG/DL
LDLC/HDLC SERPL: 1.4 {RATIO}
LEUKOCYTE ESTERASE UR QL STRIP: NEGATIVE
LYMPHOCYTES # BLD AUTO: 1.66 K/UL (ref 1–4.8)
LYMPHOCYTES NFR BLD AUTO: 19.4 % (ref 27–41)
MCH RBC QN AUTO: 27.8 PG (ref 27–31)
MCHC RBC AUTO-ENTMCNC: 31 G/DL (ref 32–36)
MCV RBC AUTO: 89.7 FL (ref 80–96)
MONOCYTES # BLD AUTO: 0.67 K/UL (ref 0–0.8)
MONOCYTES NFR BLD AUTO: 7.8 % (ref 2–6)
MPC BLD CALC-MCNC: 9.4 FL (ref 9.4–12.4)
NEUTROPHILS # BLD AUTO: 5.9 K/UL (ref 1.8–7.7)
NEUTROPHILS NFR BLD AUTO: 69 % (ref 53–65)
NITRITE UR QL STRIP: NEGATIVE
NONHDLC SERPL-MCNC: 86 MG/DL
NRBC # BLD AUTO: 0 X10E3/UL
NRBC, AUTO (.00): 0 %
PH UR STRIP: 5.5 PH UNITS
PLATELET # BLD AUTO: 678 K/UL (ref 150–400)
POTASSIUM SERPL-SCNC: 3.7 MMOL/L (ref 3.5–5.1)
PROT SERPL-MCNC: 8.5 G/DL (ref 6.4–8.2)
PROT UR QL STRIP: NEGATIVE
RBC # BLD AUTO: 3.88 M/UL (ref 4.2–5.4)
RBC # UR STRIP: NEGATIVE /UL
SODIUM SERPL-SCNC: 140 MMOL/L (ref 136–145)
SP GR UR STRIP: 1.02
TRIGL SERPL-MCNC: 47 MG/DL (ref 35–150)
TSH SERPL DL<=0.005 MIU/L-ACNC: 1.9 UIU/ML (ref 0.36–3.74)
UROBILINOGEN UR STRIP-ACNC: NORMAL MG/DL
VLDLC SERPL-MCNC: 9 MG/DL
WBC # BLD AUTO: 8.55 K/UL (ref 4.5–11)

## 2024-04-05 PROCEDURE — 82306 VITAMIN D 25 HYDROXY: CPT | Mod: ,,, | Performed by: CLINICAL MEDICAL LABORATORY

## 2024-04-05 PROCEDURE — 80061 LIPID PANEL: CPT | Mod: ,,, | Performed by: CLINICAL MEDICAL LABORATORY

## 2024-04-05 PROCEDURE — 80053 COMPREHEN METABOLIC PANEL: CPT | Mod: ,,, | Performed by: CLINICAL MEDICAL LABORATORY

## 2024-04-05 PROCEDURE — 85025 COMPLETE CBC W/AUTO DIFF WBC: CPT | Mod: ,,, | Performed by: CLINICAL MEDICAL LABORATORY

## 2024-04-05 PROCEDURE — 84443 ASSAY THYROID STIM HORMONE: CPT | Mod: ,,, | Performed by: CLINICAL MEDICAL LABORATORY

## 2024-04-05 PROCEDURE — 81003 URINALYSIS AUTO W/O SCOPE: CPT | Mod: QW,,, | Performed by: CLINICAL MEDICAL LABORATORY

## 2024-04-05 PROCEDURE — 83036 HEMOGLOBIN GLYCOSYLATED A1C: CPT | Mod: ,,, | Performed by: CLINICAL MEDICAL LABORATORY

## 2024-04-05 PROCEDURE — 99214 OFFICE O/P EST MOD 30 MIN: CPT | Mod: ,,, | Performed by: FAMILY MEDICINE

## 2024-04-05 RX ORDER — POTASSIUM CHLORIDE 750 MG/1
10 TABLET, EXTENDED RELEASE ORAL 2 TIMES DAILY
Qty: 180 TABLET | Refills: 3 | Status: SHIPPED | OUTPATIENT
Start: 2024-04-05

## 2024-04-05 RX ORDER — ZOLPIDEM TARTRATE 10 MG/1
10 TABLET ORAL NIGHTLY PRN
Qty: 90 TABLET | Refills: 1 | Status: SHIPPED | OUTPATIENT
Start: 2024-04-05 | End: 2024-10-04

## 2024-04-08 NOTE — PROGRESS NOTES
Left VM for pt to either increase the amount of D3 she is taking or let us know if wants to try the rx Vit D2. I read off the above lab results in VM as well.

## 2024-04-10 RX ORDER — LIDOCAINE HYDROCHLORIDE 10 MG/ML
1 INJECTION INFILTRATION; PERINEURAL ONCE
Status: DISPENSED | OUTPATIENT
Start: 2024-04-10

## 2024-04-12 ENCOUNTER — HOSPITAL ENCOUNTER (OUTPATIENT)
Facility: HOSPITAL | Age: 74
Discharge: HOME | End: 2024-04-12
Attending: FAMILY MEDICINE | Admitting: FAMILY MEDICINE
Payer: MEDICARE

## 2024-04-12 ENCOUNTER — ANESTHESIA EVENT (OUTPATIENT)
Dept: SURGERY | Facility: HOSPITAL | Age: 74
End: 2024-04-12
Payer: MEDICARE

## 2024-04-12 ENCOUNTER — ANESTHESIA (OUTPATIENT)
Dept: SURGERY | Facility: HOSPITAL | Age: 74
End: 2024-04-12
Payer: MEDICARE

## 2024-04-12 VITALS
BODY MASS INDEX: 41.15 KG/M2 | RESPIRATION RATE: 18 BRPM | WEIGHT: 247 LBS | OXYGEN SATURATION: 98 % | SYSTOLIC BLOOD PRESSURE: 149 MMHG | HEIGHT: 65 IN | DIASTOLIC BLOOD PRESSURE: 81 MMHG | TEMPERATURE: 98 F | HEART RATE: 69 BPM

## 2024-04-12 DIAGNOSIS — I87.2 VENOUS INSUFFICIENCY: Primary | ICD-10-CM

## 2024-04-12 PROCEDURE — 37000008 HC ANESTHESIA 1ST 15 MINUTES: Performed by: FAMILY MEDICINE

## 2024-04-12 PROCEDURE — 36000705 HC OR TIME LEV I EA ADD 15 MIN: Performed by: FAMILY MEDICINE

## 2024-04-12 PROCEDURE — 37000009 HC ANESTHESIA EA ADD 15 MINS: Performed by: FAMILY MEDICINE

## 2024-04-12 PROCEDURE — 25000003 PHARM REV CODE 250: Performed by: NURSE ANESTHETIST, CERTIFIED REGISTERED

## 2024-04-12 PROCEDURE — C1894 INTRO/SHEATH, NON-LASER: HCPCS | Performed by: FAMILY MEDICINE

## 2024-04-12 PROCEDURE — D9220A PRA ANESTHESIA: Mod: 23,ANES,, | Performed by: ANESTHESIOLOGY

## 2024-04-12 PROCEDURE — 27201423 OPTIME MED/SURG SUP & DEVICES STERILE SUPPLY: Performed by: FAMILY MEDICINE

## 2024-04-12 PROCEDURE — 36000704 HC OR TIME LEV I 1ST 15 MIN: Performed by: FAMILY MEDICINE

## 2024-04-12 PROCEDURE — 63600175 PHARM REV CODE 636 W HCPCS: Performed by: NURSE ANESTHETIST, CERTIFIED REGISTERED

## 2024-04-12 PROCEDURE — 36482 ENDOVEN THER CHEM ADHES 1ST: CPT | Mod: RT,,, | Performed by: FAMILY MEDICINE

## 2024-04-12 PROCEDURE — 71000033 HC RECOVERY, INTIAL HOUR: Performed by: FAMILY MEDICINE

## 2024-04-12 PROCEDURE — 63600175 PHARM REV CODE 636 W HCPCS: Performed by: FAMILY MEDICINE

## 2024-04-12 PROCEDURE — 71000015 HC POSTOP RECOV 1ST HR: Performed by: FAMILY MEDICINE

## 2024-04-12 PROCEDURE — D9220A PRA ANESTHESIA: Mod: 23,CRNA,, | Performed by: NURSE ANESTHETIST, CERTIFIED REGISTERED

## 2024-04-12 RX ORDER — ONDANSETRON HYDROCHLORIDE 2 MG/ML
4 INJECTION, SOLUTION INTRAVENOUS DAILY PRN
Status: DISCONTINUED | OUTPATIENT
Start: 2024-04-12 | End: 2024-04-12 | Stop reason: HOSPADM

## 2024-04-12 RX ORDER — FENTANYL CITRATE 50 UG/ML
INJECTION, SOLUTION INTRAMUSCULAR; INTRAVENOUS
Status: DISCONTINUED | OUTPATIENT
Start: 2024-04-12 | End: 2024-04-12

## 2024-04-12 RX ORDER — LIDOCAINE HYDROCHLORIDE 20 MG/ML
INJECTION, SOLUTION EPIDURAL; INFILTRATION; INTRACAUDAL; PERINEURAL
Status: DISCONTINUED | OUTPATIENT
Start: 2024-04-12 | End: 2024-04-12

## 2024-04-12 RX ORDER — IPRATROPIUM BROMIDE AND ALBUTEROL SULFATE 2.5; .5 MG/3ML; MG/3ML
3 SOLUTION RESPIRATORY (INHALATION) ONCE
Status: DISCONTINUED | OUTPATIENT
Start: 2024-04-12 | End: 2024-04-12 | Stop reason: HOSPADM

## 2024-04-12 RX ORDER — DIPHENHYDRAMINE HYDROCHLORIDE 50 MG/ML
25 INJECTION INTRAMUSCULAR; INTRAVENOUS EVERY 6 HOURS PRN
Status: DISCONTINUED | OUTPATIENT
Start: 2024-04-12 | End: 2024-04-12 | Stop reason: HOSPADM

## 2024-04-12 RX ORDER — PROPOFOL 10 MG/ML
VIAL (ML) INTRAVENOUS
Status: DISCONTINUED | OUTPATIENT
Start: 2024-04-12 | End: 2024-04-12

## 2024-04-12 RX ORDER — SODIUM CHLORIDE, SODIUM LACTATE, POTASSIUM CHLORIDE, CALCIUM CHLORIDE 600; 310; 30; 20 MG/100ML; MG/100ML; MG/100ML; MG/100ML
INJECTION, SOLUTION INTRAVENOUS CONTINUOUS
Status: DISCONTINUED | OUTPATIENT
Start: 2024-04-12 | End: 2024-04-12 | Stop reason: HOSPADM

## 2024-04-12 RX ADMIN — SODIUM CHLORIDE: 9 INJECTION, SOLUTION INTRAVENOUS at 11:04

## 2024-04-12 RX ADMIN — SODIUM CHLORIDE, POTASSIUM CHLORIDE, SODIUM LACTATE AND CALCIUM CHLORIDE: 600; 310; 30; 20 INJECTION, SOLUTION INTRAVENOUS at 09:04

## 2024-04-12 RX ADMIN — PROPOFOL 50 MG: 10 INJECTION, EMULSION INTRAVENOUS at 11:04

## 2024-04-12 RX ADMIN — PROPOFOL 50 MG: 10 INJECTION, EMULSION INTRAVENOUS at 12:04

## 2024-04-12 RX ADMIN — LIDOCAINE HYDROCHLORIDE 50 MG: 20 INJECTION, SOLUTION INTRAVENOUS at 11:04

## 2024-04-12 RX ADMIN — FENTANYL CITRATE 100 MCG: 50 INJECTION INTRAMUSCULAR; INTRAVENOUS at 11:04

## 2024-04-12 RX ADMIN — PROPOFOL 25 MG: 10 INJECTION, EMULSION INTRAVENOUS at 12:04

## 2024-04-12 NOTE — DISCHARGE SUMMARY
[facility]  Discharge Note  Short Stay    Procedure(s) (LRB):  ABLATION,CHEMICAL SEALANT,VARICOSE VEI (Right)      OUTCOME: Patient tolerated treatment/procedure well without complication and is now ready for discharge.    DISPOSITION: Home or Self Care    FINAL DIAGNOSIS:  Venous insufficiency    FOLLOWUP: In clinic    DISCHARGE INSTRUCTIONS:  No discharge procedures on file.     TIME SPENT ON DISCHARGE: 5 minutes

## 2024-04-12 NOTE — OR NURSING
1237 Rec'd pt to PACU drowsy but arousable to verbal stimuli. No signs of distress noted, respirations even and unlabored. VSS. RLE dressing C/D/I. Denies pain/needs. Will continue to monitor.    1306 Out of PACU. VSS. No signs of bleeding/distress noted.     1310 Pt to ASC 20 awake and alert with no distress noted, respirations even and unlabored. Family at bedside. Bedside report given to CHELITA Sanders RN. RLE dressing C/D/I. Denies pain/needs. /77, P 66, R 16, O2 97% RA.

## 2024-04-12 NOTE — OP NOTE
Date: 4/12/24   Surgeon: Dr Yoshi Alaniz DO    Procedure: Endovenous Adhesive Ablation of the right Greater Saphenous Vein     Estimated blood loss: 3 cc.  Specimens removed:  None.  Complications:  None.  Implants or grafts:  None.    Findings: Treatment Length  45 (cm):  Maximum diameter of the vein treated 6.8 mm with a maximum reflux time of 3.2 seconds.    Pre-Procedure: Patient's vital signs and informed consent were obtained. Patient history was checked and updated with any changes since the last visit. The patient continues to present with symptoms of venous disease as proven via DUS Venous Insufficiency exam completed prior to procedure. A complete Time Out was performed; with all parties present, which verified patient's identification, intended procedure, correct procedure site, and all equipment/supplies needed to complete the procedure.     Procedure: Ultrasound guidance was used to venkat the target vein with the patient positioned on the treatment table. The entire lower extremity was prepped with a 50/50 % solution of isopropyl alcohol and chlorhexidine; then sterile drapes were applied. Once the desired access point was identified; less than 5mL of 0.5% Lidocaine buffered with Sodium Bicarbonate was distributed, to comfortably gain access in real-time with ultrasound guidance. A guidewire was used as necessary, which allowed insertion of the blue introduction catheter. Once positioned, the delivery catheter was prepped and inserted into the introducer sheath. The delivery catheter position was confirmed via ultrasound 5cm distal to Saphenofemoral junction. Following the IFU, adhesive was delivered, segmentally, into the target vein. Ultrasound visualization confirmed successful treatment of the targeted vein with no evidence of complications at the junction. All devices were then removed, and hemostasis was achieved with a suture. Dressings with compression were applied to the access site and to any  puncture sites requiring them. There was minimal blood loss.

## 2024-04-12 NOTE — TRANSFER OF CARE
"Anesthesia Transfer of Care Note    Patient: Janay Reveles    Procedure(s) Performed: Procedure(s) (LRB):  ABLATION,CHEMICAL SEALANT,VARICOSE VEI (Right)    Patient location: PACU    Anesthesia Type: MAC    Transport from OR: Transported from OR on room air with adequate spontaneous ventilation    Post pain: adequate analgesia    Post assessment: no apparent anesthetic complications    Post vital signs: stable    Level of consciousness: responds to stimulation    Nausea/Vomiting: no nausea/vomiting    Complications: none    Transfer of care protocol was followed      Last vitals: Visit Vitals  BP (!) 141/82 (Patient Position: Lying)   Pulse 76   Temp 36.4 °C (97.5 °F) (Oral)   Resp 12   Ht 5' 5" (1.651 m)   Wt 112 kg (247 lb)   LMP  (LMP Unknown)   SpO2 98%   Breastfeeding No   BMI 41.10 kg/m²     "

## 2024-04-12 NOTE — ANESTHESIA POSTPROCEDURE EVALUATION
Anesthesia Post Evaluation    Patient: Janay Reveles    Procedure(s) Performed: Procedure(s) (LRB):  ABLATION,CHEMICAL SEALANT,VARICOSE VEI (Right)    Final Anesthesia Type: general      Patient location during evaluation: PACU  Patient participation: Yes- Able to Participate  Level of consciousness: awake and alert  Post-procedure vital signs: reviewed and stable  Pain management: adequate  Airway patency: patent  ELMA mitigation strategies: Multimodal analgesia  PONV status at discharge: No PONV  Anesthetic complications: no      Cardiovascular status: blood pressure returned to baseline  Respiratory status: unassisted  Hydration status: euvolemic  Follow-up not needed.              Vitals Value Taken Time   /81 04/12/24 1400   Temp 36.4 °C (97.5 °F) 04/12/24 1240   Pulse 69 04/12/24 1400   Resp 18 04/12/24 1400   SpO2 98 % 04/12/24 1400         Event Time   Out of Recovery 13:06:00         Pain/Briana Score: Briana Score: 10 (4/12/2024  2:10 PM)

## 2024-04-12 NOTE — H&P
Patient ID: Janay Reveles is a 73 y.o. female.     I. HISTORY      Chief Complaint:        Chief Complaint   Patient presents with    Leg Swelling       Room 3. NP RF MILADY BOSS. LEG PAIN/SWELLING VV         HPI: Janay Reveles is a 73 y.o. female who is referred here today by Milady Boss FNP for evaluation of bilateral lower extremity swelling, hyperpigmentation and pain.  Symptoms are progressive/worsening and began about 3-4 years ago.  Location is bilateral lower extremities below the knees. Symptoms are worse at the end of the day.  History of venous interventions includes none.  Positive, Grandmother family history of venous disease.  Patient states that she had a rupture of a vein in her left medial ankle one-week ago.  States she held pressure until the bleeding stopped.  No further episodes.  Denies history of CHF, cellulitis or DVT.     The patient underwent a bilateral complete venous reflux study today, 03/05/2024, and the results were discussed with the patient.  This study shows no evidence of DVT bilaterally.  Deep venous reflux noted on the left.  The study also shows dilation and axial reflux of the bilateral great saphenous veins.  Area of pre rupture noted in the right medial Gator region/ankle.  Pathological calf  also noted on the left.     Venous Disease Medical Necessity Documentation Initial Visit Date:3/5/24 Return Check Date:    Have you ever had a rupture or bleed from a varicose vein in your leg(s)?              [] Yes  [x] No   [] Yes   [] No   Have you ever been diagnosed with phlebitis, cellulitis, or inflammation in the area of the varicose veins of  your leg(s)?  [] Yes  [x] No    [] Yes   [] No   Do you have darkened or inflamed skin on your legs?   [x] Yes   [] No   [] Yes   [] No   Do you have leg swelling?      [x] Yes   [] No   [] Yes   [] No   Do you have leg pain?   [x] Yes   [] No   [] Yes   [] No   If yes, describe the type of pain?    [x]   Stabbing [x]   Radiating [x]  Aching   [x]  Tightness []  Throbbing               [x]  Burning [x]  Cramping               Do you have leg discomfort?   [x] Yes   [] No   [] Yes   [] No   If yes, describe the type of discomfort?    [x]  Heaviness [x]  Fullness   [x]  Restlessness [x] Tired/Fatigued [x] Itching               Have you ever worn compression hose?   [] Yes   [x] No   [] Yes   [] No   If yes, how long?             Do you elevate your legs while sitting?   [x] Yes   [] No   [] Yes   [] No   Does venous disease (varicose veins, ulcers, skin changes, leg pain/swelling) interfere with your daily life?  If yes, check activities you are limited or unable to do.     []  Shower  [x]   Walk  []  Exercise  [] Play with children/grandchildren  []  Shop [] Work [x] Stand for any period of time [x] Sleep                               [x] Sitting for an extended period of time.              [x] Yes   [] No   [] Yes   [] No   Do you exercise/have you tried to exercise (i.e.  Walk our participate in a regular exercise routine)?  [] Yes  [x] No   [] Yes   [] No   BMI 40.5                     Past Medical History:   Diagnosis Date    Anemia      Breast cancer screening by mammogram 09/07/2023     [facility]    Encounter for long-term (current) use of other medications      Encounter for screening colonoscopy 05/13/2015    Essential hypertension      Goiter      Gout      H/O bone density study 02/27/2020    History of esophagogastroduodenoscopy (EGD) 01/2020    Hypokalemia      Hypothyroidism      Insomnia      Pre-diabetes      Screening for malignant neoplasm of cervix 01/20/2020    Screening mammogram, encounter for 07/16/2021     [facility]/negative exam/scattered fibroglandular densities/recommended anual screening    Screening mammogram, encounter for 06/11/2020               Past Surgical History:   Procedure Laterality Date    HYSTERECTOMY        TUBAL LIGATION             Social History           Tobacco Use   Smoking Status Never     Passive exposure: Never   Smokeless Tobacco Never            Current Outpatient Medications:     albuterol (PROVENTIL/VENTOLIN HFA) 90 mcg/actuation inhaler, Inhale 2 puffs into the lungs every 4 (four) hours as needed for Wheezing. Rescue, Disp: 25.5 g, Rfl: 3    allopurinoL (ZYLOPRIM) 100 MG tablet, TAKE 2 TABLETS BY MOUTH ONCE DAILY, Disp: 180 tablet, Rfl: 3    amLODIPine (NORVASC) 5 MG tablet, Take 1 tablet by mouth once daily, Disp: 90 tablet, Rfl: 3    fluconazole (DIFLUCAN) 200 MG Tab, Take 1 tablet every 3 days as needed for yeast infection symptoms., Disp: 10 tablet, Rfl: 5    ibuprofen (ADVIL,MOTRIN) 200 MG tablet, Take 200 mg by mouth every 6 (six) hours as needed for Pain., Disp: , Rfl:     levothyroxine (SYNTHROID) 75 MCG tablet, TAKE 1 TABLET BY MOUTH EVERY DAY, Disp: 90 tablet, Rfl: 3    losartan (COZAAR) 100 MG tablet, Take 1 tablet by mouth once daily, Disp: 90 tablet, Rfl: 3    montelukast (SINGULAIR) 10 mg tablet, TAKE 1 TABLET BY MOUTH EVERY DAY, Disp: 90 tablet, Rfl: 3    naproxen (NAPROSYN) 500 MG tablet, Take 1 tablet (500 mg total) by mouth 2 (two) times daily as needed (pain)., Disp: 180 tablet, Rfl: 3    potassium chloride (KLOR-CON) 10 MEQ TbSR, Take 1 tablet (10 mEq total) by mouth 2 (two) times daily., Disp: 180 tablet, Rfl: 3    senna-docusate 8.6-50 mg (PERICOLACE) 8.6-50 mg per tablet, Take 1 tablet by mouth 2 (two) times daily., Disp: 60 tablet, Rfl: 2    spironolactone (ALDACTONE) 25 MG tablet, Take 1 tablet (25 mg total) by mouth once daily., Disp: 90 tablet, Rfl: 3    zolpidem (AMBIEN) 10 mg Tab, Take 1 tablet (10 mg total) by mouth nightly as needed (insomnia)., Disp: 90 tablet, Rfl: 1    aspirin 325 MG tablet, Take 1 tablet (325 mg total) by mouth once daily., Disp: 30 tablet, Rfl: 0     Review of Systems   Constitutional:  Negative for activity change, chills, diaphoresis, fatigue and fever.   Respiratory:  Negative for cough and shortness of breath.    Cardiovascular:   Positive for leg swelling. Negative for chest pain and claudication.        Hyperpigmentation LE   Gastrointestinal:  Negative for nausea and vomiting.   Musculoskeletal:  Positive for leg pain. Negative for joint swelling.   Integumentary:  Negative for rash and wound.   Neurological:  Negative for weakness and numbness.            II. PHYSICAL EXAM      Physical Exam  Constitutional:       General: She is awake. She is not in acute distress.     Appearance: Normal appearance. She is obese. She is not ill-appearing or toxic-appearing.   HENT:      Head: Normocephalic and atraumatic.   Eyes:      Extraocular Movements: Extraocular movements intact.      Conjunctiva/sclera: Conjunctivae normal.      Pupils: Pupils are equal, round, and reactive to light.   Neck:      Vascular: No carotid bruit or JVD.   Cardiovascular:      Rate and Rhythm: Normal rate and regular rhythm.      Pulses:           Dorsalis pedis pulses are detected w/ Doppler on the right side and detected w/ Doppler on the left side.        Posterior tibial pulses are detected w/ Doppler on the right side and detected w/ Doppler on the left side.      Heart sounds: No murmur heard.  Pulmonary:      Effort: Pulmonary effort is normal. No respiratory distress.      Breath sounds: No stridor. No wheezing, rhonchi or rales.   Musculoskeletal:         General: No swelling, tenderness or deformity.      Right lower le+ Edema present.      Left lower le+ Edema present.      Comments: Edema with hyperpigmentation of the bilateral lower extremities without evidence of acute cellulitis or open ulceration.   Feet:      Comments: Triphasic hand-held dopplerable pulses of the bilateral dorsalis pedis and posterior tibial arteries.  Skin:     General: Skin is warm.      Capillary Refill: Capillary refill takes less than 2 seconds.      Coloration: Skin is not ashen.      Findings: No bruising, erythema, lesion, rash or wound.   Neurological:      Mental  Status: She is alert and oriented to person, place, and time.      Motor: No weakness.   Psychiatric:         Speech: Speech normal.         Behavior: Behavior normal. Behavior is cooperative.            Reticular/Spider veins noted:  RLE: medial calf, ankle, and foot  LLE: medial calf, ankle, and foot     Varicose veins noted:  RLE: medial calf and ankle  LLE:  medial calf and ankle     CEAP Classification  Clinical Signs: Class 4 - Skin changes ascribed to venous disease  Etiologic Classification: Primary  Anatomic distribution: Superficial  Pathophysiologic dysfunction: Reflux                            Venous Clinical Severity Score  Pain:2=Daily, moderate activity limitation, occasional analgesics  Varicose Veins: 1=Few, scattered. Branch varicose veins  Venous Edema: 2=Afternoon edema, above ankle  Pigmentation: 2=Diffuse over most of gaiter distribution (lower 1/3) or recent pigmentation (purple)  Inflammation: 0=None  Induration: 0=None  Number of Active Ulcers: 0=0  Active Ulceration, Duration: 0=None  Active Ulcer Size: 0=None  Compressive Therapy: 0=Not used or not compliant  Total Score: 7        III. ASSESSMENT & PLAN (MEDICAL DECISION MAKING)      1. Pain in both lower extremities    2. Varicose veins of ankle    3. Asymptomatic ruptured varicose vein of right lower extremity    4. Hyperpigmentation of skin    5. Leg pain, bilateral    6. Venous insufficiency          Assessment/Diagnosis and Plan:  The patient continues to have sequela of chronic venous insufficiency despite over 3 months of conservative therapy. The patient continues to have life altering symptoms as well as a positive reflux study as noted in the history of present illness above. At this time I believe it would be reasonable to proceed with a right great saphenous vein non thermal adhesive endovenous ablation for symptomatic venous insufficiency.  Untreated venous disease increases the patient's risk for cellulitis, deep vein  thrombosis, chronic skin changes and venous ulceration.  Risk and benefits of the procedure were explained to the patient and the patient wishes to proceed. The patient does not have overly distended veins and denies history of DVT/PE and will not require prophylactic anticoagulation.     Patient also noted to have chronic venous insufficiency in her left lower extremity.  We will also start the patient on conservative therapy for chronic venous insufficiency consisting of 20-30 millimeter of mercury compression stockings, calf pumping exercises and therapeutic leg elevation.  We will follow-up on her left lower extremity and 3 months.         Orders Placed This Encounter    US Venous Reflux Study Bilateral            Darron Alaniz DO

## 2024-04-12 NOTE — ANESTHESIA PREPROCEDURE EVALUATION
04/12/2024  Janay Reveles is a 73 y.o., female.      Pre-op Assessment    I have reviewed the Patient Summary Reports.     I have reviewed the Nursing Notes. I have reviewed the NPO Status.   I have reviewed the Medications.     Review of Systems  Anesthesia Hx:             Denies Family Hx of Anesthesia complications.    Denies Personal Hx of Anesthesia complications.                    Social:  Non-Smoker, No Alcohol Use       Cardiovascular:     Hypertension                       Chronic Venous Insufficiency            Hypertension         Pulmonary:    Asthma       Asthma:               Renal/:  Chronic Renal Disease        Kidney Function/Disease             Musculoskeletal:  Musculoskeletal Normal         Arthritis          Neurological:      Arthritis                           Endocrine:   Hypothyroidism       Hypothyroidism        Morbid Obesity / BMI > 40      Chemistry        Component Value Date/Time     04/05/2024 0859    K 3.7 04/05/2024 0859     (H) 04/05/2024 0859    CO2 25 04/05/2024 0859    BUN 17 04/05/2024 0859    CREATININE 0.91 04/05/2024 0859     (H) 04/05/2024 0859        Component Value Date/Time    CALCIUM 9.5 04/05/2024 0859    ALKPHOS 91 04/05/2024 0859    AST 6 (L) 04/05/2024 0859    ALT 13 04/05/2024 0859    BILITOT 0.3 04/05/2024 0859    ESTGFRAFRICA 67 10/14/2021 0946    EGFRNONAA 39 (L) 07/19/2022 0420        Lab Results   Component Value Date    WBC 8.55 04/05/2024    HGB 10.8 (L) 04/05/2024    HCT 34.8 (L) 04/05/2024     (H) 04/05/2024     No results found for this or any previous visit.         Anesthesia Plan  Type of Anesthesia, risks & benefits discussed:    Anesthesia Type: Gen Natural Airway, MAC  Intra-op Monitoring Plan: Standard ASA Monitors  Post Op Pain Control Plan: multimodal analgesia  Induction:  IV  Airway Plan: Direct  Informed  Consent: Informed consent signed with the Patient and all parties understand the risks and agree with anesthesia plan.  All questions answered.   ASA Score: 3  Day of Surgery Review of History & Physical: H&P Update referred to the surgeon/provider.I have interviewed and examined the patient. I have reviewed the patient's H&P dated: There are no significant changes.     Ready For Surgery From Anesthesia Perspective.     .

## 2024-04-15 ENCOUNTER — OFFICE VISIT (OUTPATIENT)
Dept: VASCULAR SURGERY | Facility: CLINIC | Age: 74
End: 2024-04-15
Payer: MEDICARE

## 2024-04-15 ENCOUNTER — HOSPITAL ENCOUNTER (OUTPATIENT)
Dept: RADIOLOGY | Facility: HOSPITAL | Age: 74
Discharge: HOME | End: 2024-04-15
Attending: FAMILY MEDICINE
Payer: MEDICARE

## 2024-04-15 VITALS
WEIGHT: 246.94 LBS | DIASTOLIC BLOOD PRESSURE: 84 MMHG | RESPIRATION RATE: 18 BRPM | HEART RATE: 69 BPM | BODY MASS INDEX: 41.14 KG/M2 | HEIGHT: 65 IN | SYSTOLIC BLOOD PRESSURE: 149 MMHG

## 2024-04-15 DIAGNOSIS — M79.604 PAIN IN BOTH LOWER EXTREMITIES: ICD-10-CM

## 2024-04-15 DIAGNOSIS — I83.90 VARICOSE VEINS OF ANKLE: ICD-10-CM

## 2024-04-15 DIAGNOSIS — I87.2 VENOUS INSUFFICIENCY: ICD-10-CM

## 2024-04-15 DIAGNOSIS — I87.2 VENOUS INSUFFICIENCY: Primary | ICD-10-CM

## 2024-04-15 DIAGNOSIS — I83.91: ICD-10-CM

## 2024-04-15 DIAGNOSIS — L81.9 HYPERPIGMENTATION OF SKIN: ICD-10-CM

## 2024-04-15 DIAGNOSIS — M79.605 PAIN IN BOTH LOWER EXTREMITIES: ICD-10-CM

## 2024-04-15 PROCEDURE — 99214 OFFICE O/P EST MOD 30 MIN: CPT | Mod: S$PBB,,, | Performed by: FAMILY MEDICINE

## 2024-04-15 PROCEDURE — 93971 EXTREMITY STUDY: CPT | Mod: 26,,, | Performed by: FAMILY MEDICINE

## 2024-04-15 PROCEDURE — 99024 POSTOP FOLLOW-UP VISIT: CPT | Mod: ,,, | Performed by: FAMILY MEDICINE

## 2024-04-15 PROCEDURE — 99214 OFFICE O/P EST MOD 30 MIN: CPT | Mod: PBBFAC,25 | Performed by: FAMILY MEDICINE

## 2024-04-15 PROCEDURE — 93971 EXTREMITY STUDY: CPT | Mod: TC

## 2024-04-15 NOTE — PROGRESS NOTES
VEIN CENTER CLINIC NOTE    Patient ID: Janay Reveles is a 73 y.o. female.    I. HISTORY     Chief Complaint:   Chief Complaint   Patient presents with    Follow-up     US 3D S/P RT GSV VENASEAL ABL         HPI: Janay Reveles is a 73 y.o. female who presents today for a 3 day follow-up after undergoing a right great saphenous vein non thermal adhesive ablation.  The patient states she did well over the weekend without complications.  Right leg feels less tight and less heavy.  She also has known chronic venous insufficiency of the left lower extremity which she is currently undergoing conservative therapy which was started on 03/05/2024.    Clinical summary  Patient initially seen on 03/05/2024 by Milady GOMEZ for evaluation of bilateral lower extremity swelling, hyperpigmentation and pain.  Symptoms are progressive/worsening and began about 3-4 years ago.  Location is bilateral lower extremities below the knees. Symptoms are worse at the end of the day.  History of venous interventions includes none.  Positive, Grandmother family history of venous disease.  Patient states that she had a rupture of a vein in her left medial ankle one-week ago.  States she held pressure until the bleeding stopped.  No further episodes.  Denies history of CHF, cellulitis or DVT.    The patient underwent a bilateral complete venous reflux study today, 03/05/2024, and the results were discussed with the patient.  This study shows no evidence of DVT bilaterally.  Deep venous reflux noted on the left.  The study also shows dilation and axial reflux of the bilateral great saphenous veins.  Area of pre rupture noted in the right medial Gator region/ankle.  Pathological calf  also noted on the left.    Venous Disease Medical Necessity Documentation Initial Visit Date:3/5/24 Return Check Date:    Have you ever had a rupture or bleed from a varicose vein in your leg(s)?              [] Yes  [x] No   [] Yes   [] No   Have you  ever been diagnosed with phlebitis, cellulitis, or inflammation in the area of the varicose veins of  your leg(s)?  [] Yes  [x] No    [] Yes   [] No   Do you have darkened or inflamed skin on your legs?   [x] Yes   [] No   [] Yes   [] No   Do you have leg swelling?     [x] Yes   [] No   [] Yes   [] No   Do you have leg pain?   [x] Yes   [] No   [] Yes   [] No   If yes, describe the type of pain?    [x]   Stabbing [x]  Radiating [x]  Aching   [x]  Tightness []  Throbbing               [x]  Burning [x]  Cramping              Do you have leg discomfort?   [x] Yes   [] No   [] Yes   [] No   If yes, describe the type of discomfort?    [x]  Heaviness [x]  Fullness   [x]  Restlessness [x] Tired/Fatigued [x] Itching              Have you ever worn compression hose?   [] Yes   [x] No   [] Yes   [] No   If yes, how long?           Do you elevate your legs while sitting?   [x] Yes   [] No   [] Yes   [] No   Does venous disease (varicose veins, ulcers, skin changes, leg pain/swelling) interfere with your daily life?  If yes, check activities you are limited or unable to do.    []  Shower  [x]   Walk  []  Exercise  [] Play with children/grandchildren  []  Shop [] Work [x] Stand for any period of time [x] Sleep                               [x] Sitting for an extended period of time.           [x] Yes   [] No   [] Yes   [] No   Do you exercise/have you tried to exercise (i.e.  Walk our participate in a regular exercise routine)?  [] Yes  [x] No   [] Yes   [] No   BMI 40.5             Past Medical History:   Diagnosis Date    Anemia     Arthritis     Asthma     Breast cancer screening by mammogram 09/07/2023    [facility]    Encounter for long-term (current) use of other medications     Encounter for screening colonoscopy 05/13/2015    Essential hypertension     Goiter     Gout     H/O bone density study 02/27/2020    History of esophagogastroduodenoscopy (EGD) 01/2020    Hypokalemia     Hypothyroidism     Insomnia     Pre-diabetes      Screening for malignant neoplasm of cervix 01/20/2020    Screening mammogram, encounter for 07/16/2021    [facility]/negative exam/scattered fibroglandular densities/recommended anual screening    Screening mammogram, encounter for 06/11/2020        Past Surgical History:   Procedure Laterality Date    ABLATION, CHEMICAL SEALANT, VARICOSE VEIN Right 4/12/2024    Procedure: ABLATION,CHEMICAL SEALANT,VARICOSE VEI;  Surgeon: Darron Alaniz DO;  Location: [facility];  Service: Peripheral Vascular;  Laterality: Right;    HYSTERECTOMY      TUBAL LIGATION         Social History     Tobacco Use   Smoking Status Never    Passive exposure: Never   Smokeless Tobacco Never         Current Outpatient Medications:     albuterol (PROVENTIL/VENTOLIN HFA) 90 mcg/actuation inhaler, Inhale 2 puffs into the lungs every 4 (four) hours as needed for Wheezing. Rescue, Disp: 25.5 g, Rfl: 3    allopurinoL (ZYLOPRIM) 100 MG tablet, TAKE 2 TABLETS BY MOUTH ONCE DAILY, Disp: 180 tablet, Rfl: 3    amLODIPine (NORVASC) 5 MG tablet, Take 1 tablet by mouth once daily, Disp: 90 tablet, Rfl: 3    aspirin 325 MG tablet, Take 1 tablet (325 mg total) by mouth once daily., Disp: 30 tablet, Rfl: 0    fluconazole (DIFLUCAN) 200 MG Tab, Take 1 tablet every 3 days as needed for yeast infection symptoms., Disp: 10 tablet, Rfl: 5    ibuprofen (ADVIL,MOTRIN) 200 MG tablet, Take 200 mg by mouth every 6 (six) hours as needed for Pain., Disp: , Rfl:     levothyroxine (SYNTHROID) 75 MCG tablet, TAKE 1 TABLET BY MOUTH EVERY DAY, Disp: 90 tablet, Rfl: 3    losartan (COZAAR) 100 MG tablet, Take 1 tablet by mouth once daily, Disp: 90 tablet, Rfl: 3    montelukast (SINGULAIR) 10 mg tablet, TAKE 1 TABLET BY MOUTH EVERY DAY, Disp: 90 tablet, Rfl: 3    naproxen (NAPROSYN) 500 MG tablet, Take 1 tablet (500 mg total) by mouth 2 (two) times daily as needed (pain)., Disp: 180 tablet, Rfl: 3    potassium chloride (KLOR-CON) 10 MEQ TbSR, Take 1 tablet (10 mEq total) by  mouth 2 (two) times daily., Disp: 180 tablet, Rfl: 3    senna-docusate 8.6-50 mg (PERICOLACE) 8.6-50 mg per tablet, Take 1 tablet by mouth 2 (two) times daily., Disp: 60 tablet, Rfl: 2    spironolactone (ALDACTONE) 25 MG tablet, Take 1 tablet (25 mg total) by mouth once daily., Disp: 90 tablet, Rfl: 3    zolpidem (AMBIEN) 10 mg Tab, Take 1 tablet (10 mg total) by mouth nightly as needed (insomnia)., Disp: 90 tablet, Rfl: 1  No current facility-administered medications for this visit.    Facility-Administered Medications Ordered in Other Visits:     LIDOcaine HCL 10 mg/ml (1%) injection 1 mL, 1 mL, Other, Once, Darron Alaniz, DO    Review of Systems   Constitutional:  Negative for activity change, chills, diaphoresis, fatigue and fever.   Respiratory:  Negative for cough and shortness of breath.    Cardiovascular:  Positive for leg swelling. Negative for chest pain and claudication.        Hyperpigmentation LE   Gastrointestinal:  Negative for nausea and vomiting.   Musculoskeletal:  Positive for leg pain. Negative for joint swelling.   Integumentary:  Negative for rash and wound.   Neurological:  Negative for weakness and numbness.          II. PHYSICAL EXAM     Physical Exam  Constitutional:       General: She is awake. She is not in acute distress.     Appearance: Normal appearance. She is obese. She is not ill-appearing or toxic-appearing.   HENT:      Head: Normocephalic and atraumatic.   Eyes:      Extraocular Movements: Extraocular movements intact.      Conjunctiva/sclera: Conjunctivae normal.      Pupils: Pupils are equal, round, and reactive to light.   Neck:      Vascular: No carotid bruit or JVD.   Cardiovascular:      Rate and Rhythm: Normal rate and regular rhythm.      Pulses:           Dorsalis pedis pulses are detected w/ Doppler on the right side and detected w/ Doppler on the left side.        Posterior tibial pulses are detected w/ Doppler on the right side and detected w/ Doppler on the left  side.      Heart sounds: No murmur heard.  Pulmonary:      Effort: Pulmonary effort is normal. No respiratory distress.      Breath sounds: No stridor. No wheezing, rhonchi or rales.   Musculoskeletal:         General: No swelling, tenderness or deformity.      Right lower le+ Edema present.      Left lower le+ Edema present.      Comments: Edema with hyperpigmentation of the bilateral lower extremities without evidence of acute cellulitis or open ulceration.   Feet:      Comments: Triphasic hand-held dopplerable pulses of the bilateral dorsalis pedis and posterior tibial arteries.  Skin:     General: Skin is warm.      Capillary Refill: Capillary refill takes less than 2 seconds.      Coloration: Skin is not ashen.      Findings: No bruising, erythema, lesion, rash or wound.   Neurological:      Mental Status: She is alert and oriented to person, place, and time.      Motor: No weakness.   Psychiatric:         Speech: Speech normal.         Behavior: Behavior normal. Behavior is cooperative.         Reticular/Spider veins noted:  RLE: medial calf, ankle, and foot  LLE: medial calf, ankle, and foot    Varicose veins noted:  RLE: medial calf and ankle  LLE:  medial calf and ankle    CEAP Classification  Clinical Signs: Class 4 - Skin changes ascribed to venous disease  Etiologic Classification: Primary  Anatomic distribution: Superficial  Pathophysiologic dysfunction: Reflux                       Venous Clinical Severity Score  Pain:2=Daily, moderate activity limitation, occasional analgesics  Varicose Veins: 1=Few, scattered. Branch varicose veins  Venous Edema: 2=Afternoon edema, above ankle  Pigmentation: 2=Diffuse over most of gaiter distribution (lower 1/3) or recent pigmentation (purple)  Inflammation: 0=None  Induration: 0=None  Number of Active Ulcers: 0=0  Active Ulceration, Duration: 0=None  Active Ulcer Size: 0=None  Compressive Therapy: 0=Not used or not compliant  Total Score: 7       III.  ASSESSMENT & PLAN (MEDICAL DECISION MAKING)     1. Venous insufficiency    2. Varicose veins of ankle    3. Asymptomatic ruptured varicose vein of right lower extremity    4. Pain in both lower extremities    5. Hyperpigmentation of skin          Assessment/Diagnosis and Plan:  The patient is continue with thigh-high postoperative compression next 2 weeks and then switch back to her knee-high 20-30 millimeter of mercury compression stockings for daily wear.    Patient also noted to have chronic venous insufficiency in her left lower extremity.  We will also start the patient on conservative therapy for chronic venous insufficiency consisting of 20-30 millimeter of mercury compression stockings, calf pumping exercises and therapeutic leg elevation.  We will follow-up on her left lower extremity and 3 months.          Darron Alaniz, DO

## 2024-04-23 NOTE — PROGRESS NOTES
Subjective     Patient ID: Janay Reveles is a 73 y.o. female.    Chief Complaint: Follow-up (6 Month Follow Up ) and Medication Refill (Refill on Potassium Cholride and Ambien)    72 yo BF here for check up and lab.     Follow-up  Pertinent negatives include no abdominal pain, chest pain or headaches.   Medication Refill  Pertinent negatives include no abdominal pain, chest pain or headaches.     Review of Systems   Constitutional:  Negative for activity change.   Eyes:  Negative for visual disturbance.   Respiratory:  Negative for shortness of breath.    Cardiovascular:  Negative for chest pain.   Gastrointestinal:  Negative for abdominal pain.   Neurological:  Negative for headaches.   Psychiatric/Behavioral:  Negative for dysphoric mood.      Office Visit on 04/05/2024   Component Date Value Ref Range Status    Color, UA 04/05/2024 Light-Yellow  Colorless, Straw, Yellow, Light Yellow, Dark Yellow Final    Clarity, UA 04/05/2024 Clear  Clear Final    pH, UA 04/05/2024 5.5  5.0 to 8.0 pH Units Final    Leukocytes, UA 04/05/2024 Negative  Negative Final    Nitrites, UA 04/05/2024 Negative  Negative Final    Protein, UA 04/05/2024 Negative  Negative Final    Glucose, UA 04/05/2024 Normal  Normal mg/dL Final    Ketones, UA 04/05/2024 Negative  Negative mg/dL Final    Urobilinogen, UA 04/05/2024 Normal  0.2, 1.0, Normal mg/dL Final    Bilirubin, UA 04/05/2024 Negative  Negative Final    Blood, UA 04/05/2024 Negative  Negative Final    Specific Gravity, UA 04/05/2024 1.019  <=1.030 Final    Vitamin D 25-Hydroxy, Blood 04/05/2024 18.4  ng/mL Final    Vitamin D 25-OH Adult Reference Values:  Deficiency: <20 ng/mL  Insufficiency: 20 - <30 ng/mL  Sufficiency: 30 -100 ng/mL    Vitamin D 25-OH Pediatric Reference Values:  Deficiency: <15 ng/mL  Insufficiency: 15 - <20 ng/mL  Sufficiency: 20 - 100 ng/mL    TSH 04/05/2024 1.900  0.358 - 3.740 uIU/mL Final    Sodium 04/05/2024 140  136 - 145 mmol/L Final    Potassium  04/05/2024 3.7  3.5 - 5.1 mmol/L Final    Chloride 04/05/2024 109 (H)  98 - 107 mmol/L Final    CO2 04/05/2024 25  21 - 32 mmol/L Final    Anion Gap 04/05/2024 10  7 - 16 mmol/L Final    Glucose 04/05/2024 115 (H)  74 - 106 mg/dL Final    BUN 04/05/2024 17  7 - 18 mg/dL Final    Creatinine 04/05/2024 0.91  0.55 - 1.02 mg/dL Final    BUN/Creatinine Ratio 04/05/2024 19  6 - 20 Final    Calcium 04/05/2024 9.5  8.5 - 10.1 mg/dL Final    Total Protein 04/05/2024 8.5 (H)  6.4 - 8.2 g/dL Final    Albumin 04/05/2024 3.4 (L)  3.5 - 5.0 g/dL Final    Globulin 04/05/2024 5.1 (H)  2.0 - 4.0 g/dL Final    A/G Ratio 04/05/2024 0.7   Final    Bilirubin, Total 04/05/2024 0.3  >0.0 - 1.2 mg/dL Final    Alk Phos 04/05/2024 91  55 - 142 U/L Final    ALT 04/05/2024 13  13 - 56 U/L Final    AST 04/05/2024 6 (L)  15 - 37 U/L Final    eGFR 04/05/2024 66  >=60 mL/min/1.73m2 Final    WBC 04/05/2024 8.55  4.50 - 11.00 K/uL Final    RBC 04/05/2024 3.88 (L)  4.20 - 5.40 M/uL Final    Hemoglobin 04/05/2024 10.8 (L)  12.0 - 16.0 g/dL Final    Hematocrit 04/05/2024 34.8 (L)  38.0 - 47.0 % Final    MCV 04/05/2024 89.7  80.0 - 96.0 fL Final    MCH 04/05/2024 27.8  27.0 - 31.0 pg Final    MCHC 04/05/2024 31.0 (L)  32.0 - 36.0 g/dL Final    RDW 04/05/2024 15.9 (H)  11.5 - 14.5 % Final    Platelet Count 04/05/2024 678 (H)  150 - 400 K/uL Final    MPV 04/05/2024 9.4  9.4 - 12.4 fL Final    Neutrophils % 04/05/2024 69.0 (H)  53.0 - 65.0 % Final    Lymphocytes % 04/05/2024 19.4 (L)  27.0 - 41.0 % Final    Monocytes % 04/05/2024 7.8 (H)  2.0 - 6.0 % Final    Eosinophils % 04/05/2024 2.7  1.0 - 4.0 % Final    Basophils % 04/05/2024 0.6  0.0 - 1.0 % Final    Immature Granulocytes % 04/05/2024 0.5 (H)  0.0 - 0.4 % Final    nRBC, Auto 04/05/2024 0.0  <=0.0 % Final    Neutrophils, Abs 04/05/2024 5.90  1.80 - 7.70 K/uL Final    Lymphocytes, Absolute 04/05/2024 1.66  1.00 - 4.80 K/uL Final    Monocytes, Absolute 04/05/2024 0.67  0.00 - 0.80 K/uL Final     "Eosinophils, Absolute 04/05/2024 0.23  0.00 - 0.50 K/uL Final    Basophils, Absolute 04/05/2024 0.05  0.00 - 0.20 K/uL Final    Immature Granulocytes, Absolute 04/05/2024 0.04  0.00 - 0.04 K/uL Final    nRBC, Absolute 04/05/2024 0.00  <=0.00 x10e3/uL Final    Diff Type 04/05/2024 Auto   Final    Triglycerides 04/05/2024 47  35 - 150 mg/dL Final      Normal:  <150 mg/dL  Borderline High: 150-199 mg/dL  High:   200-499 mg/dL  Very High:  >=500    Cholesterol 04/05/2024 140  0 - 200 mg/dL Final      <200 mg/dL:  Desirable  200-240 mg/dL: Borderline High  >240 mg/dL:  High    HDL Cholesterol 04/05/2024 54  40 - 60 mg/dL Final      <40 mg/dL: Low HDL  40-60 mg/dL: Normal  >60 mg/dL: Desirable    Cholesterol/HDL Ratio (Risk Factor) 04/05/2024 2.6   Final    Non-HDL 04/05/2024 86  mg/dL Final    LDL Calculated 04/05/2024 77  mg/dL Final    Unable to calculate due to one of the following values:  Cholesterol <5  HDL Cholesterol <5  Triglycerides <10 or >400    LDL/HDL 04/05/2024 1.4   Final    Unable to calculate due to one of the following values:  Cholesterol <5  HDL Cholesterol <5  Triglycerides <10 or >400    VLDL 04/05/2024 9  mg/dL Final    Hemoglobin A1C 04/05/2024 5.7  4.5 - 6.6 % Final      Normal:               <5.7%  Pre-Diabetic:       5.7% to 6.4%  Diabetic:             >6.4%  Diabetic Goal:     <7%    Estimated Average Glucose 04/05/2024 117  mg/dL Final          Objective   Blood pressure 138/86, pulse 70, temperature 98.6 °F (37 °C), temperature source Oral, resp. rate 18, height 5' 5" (1.651 m), weight 113.8 kg (250 lb 14.4 oz), SpO2 99%.    Physical Exam  Constitutional:       Appearance: Normal appearance.   HENT:      Head: Normocephalic and atraumatic.      Right Ear: External ear normal.      Left Ear: External ear normal.      Nose: Nose normal.      Mouth/Throat:      Mouth: Mucous membranes are moist.   Eyes:      Conjunctiva/sclera: Conjunctivae normal.      Pupils: Pupils are equal, round, and " reactive to light.   Cardiovascular:      Rate and Rhythm: Normal rate and regular rhythm.      Pulses: Normal pulses.      Heart sounds: Normal heart sounds.   Pulmonary:      Effort: Pulmonary effort is normal.      Breath sounds: Normal breath sounds.   Abdominal:      General: Abdomen is flat.      Palpations: Abdomen is soft.   Musculoskeletal:         General: Normal range of motion.      Cervical back: Normal range of motion and neck supple.   Skin:     General: Skin is warm and dry.   Neurological:      General: No focal deficit present.      Mental Status: She is alert and oriented to person, place, and time.   Psychiatric:         Mood and Affect: Mood normal.         Behavior: Behavior normal.         Thought Content: Thought content normal.         Judgment: Judgment normal.            Assessment and Plan     1. Essential hypertension  -     Urinalysis, Reflex to Urine Culture  -     TSH  -     Comprehensive Metabolic Panel  -     CBC Auto Differential  -     Lipid Panel  -     Hemoglobin A1C; Future; Expected date: 10/07/2024  -     Basic Metabolic Panel; Future; Expected date: 10/07/2024    2. Anemia, unspecified type  -     Urinalysis, Reflex to Urine Culture  -     TSH  -     Comprehensive Metabolic Panel  -     CBC Auto Differential  -     Lipid Panel    3. Hypothyroidism, unspecified type  -     Urinalysis, Reflex to Urine Culture  -     TSH  -     Comprehensive Metabolic Panel  -     CBC Auto Differential  -     Lipid Panel    4. Chronic kidney disease, stage 3a  -     Urinalysis, Reflex to Urine Culture  -     TSH  -     Comprehensive Metabolic Panel  -     CBC Auto Differential  -     Lipid Panel    5. Vitamin D deficiency  -     Vitamin D    6. Pre-diabetes  -     Hemoglobin A1C  -     Hemoglobin A1C; Future; Expected date: 10/07/2024  -     Basic Metabolic Panel; Future; Expected date: 10/07/2024    7. Hypokalemia  -     potassium chloride (KLOR-CON) 10 MEQ TbSR; Take 1 tablet (10 mEq total)  by mouth 2 (two) times daily.  Dispense: 180 tablet; Refill: 3    8. Osteoarthritis of left knee, unspecified osteoarthritis type    9. Other insomnia  -     zolpidem (AMBIEN) 10 mg Tab; Take 1 tablet (10 mg total) by mouth nightly as needed (insomnia).  Dispense: 90 tablet; Refill: 1    10. Morbid obesity                   Follow up in about 6 months (around 10/5/2024) for for check up and BMP and HgA1c.

## 2024-05-09 DIAGNOSIS — Z71.89 COMPLEX CARE COORDINATION: ICD-10-CM

## 2024-05-14 ENCOUNTER — OFFICE VISIT (OUTPATIENT)
Dept: VASCULAR SURGERY | Facility: CLINIC | Age: 74
End: 2024-05-14
Payer: MEDICARE

## 2024-05-14 VITALS
HEART RATE: 70 BPM | BODY MASS INDEX: 40.98 KG/M2 | SYSTOLIC BLOOD PRESSURE: 168 MMHG | RESPIRATION RATE: 18 BRPM | DIASTOLIC BLOOD PRESSURE: 80 MMHG | HEIGHT: 65 IN | WEIGHT: 246 LBS

## 2024-05-14 DIAGNOSIS — I87.2 VENOUS INSUFFICIENCY: Primary | ICD-10-CM

## 2024-05-14 DIAGNOSIS — M79.605 PAIN IN BOTH LOWER EXTREMITIES: ICD-10-CM

## 2024-05-14 DIAGNOSIS — I83.813 VARICOSE VEINS OF BILATERAL LOWER EXTREMITIES WITH PAIN: ICD-10-CM

## 2024-05-14 DIAGNOSIS — M79.604 PAIN IN BOTH LOWER EXTREMITIES: ICD-10-CM

## 2024-05-14 PROCEDURE — 99214 OFFICE O/P EST MOD 30 MIN: CPT | Mod: PBBFAC | Performed by: FAMILY MEDICINE

## 2024-05-14 PROCEDURE — 99213 OFFICE O/P EST LOW 20 MIN: CPT | Mod: S$PBB,,, | Performed by: FAMILY MEDICINE

## 2024-05-14 NOTE — PROGRESS NOTES
[facility] CLINIC NOTE    Patient ID: Janay Reveles is a 73 y.o. female.    I. HISTORY     Chief Complaint:   Chief Complaint   Patient presents with    Follow-up     Room 3. 1M PA         HPI: Janay Reveles is a 73 y.o. female who presents today for a 1 month follow-up after undergoing a right great saphenous vein non thermal adhesive ablation.  She states that she is done well since the procedure and has not had any further bleed from her previously ruptured varicose vein.  She also states the leg feels lighter and less tight.  She does admit that she has some symptomatic itching varicosities of her right lower extremity.  Left lower extremity continues to be edematous and uncomfortable.  She also has known chronic venous insufficiency of the left lower extremity which she is currently undergoing conservative therapy which was started on 03/05/2024.    Clinical summary  Patient initially seen on 03/05/2024 by Milady GOMEZ for evaluation of bilateral lower extremity swelling, hyperpigmentation and pain.  Symptoms are progressive/worsening and began about 3-4 years ago.  Location is bilateral lower extremities below the knees. Symptoms are worse at the end of the day.  History of venous interventions includes none.  Positive, Grandmother family history of venous disease.  Patient states that she had a rupture of a vein in her left medial ankle one-week ago.  States she held pressure until the bleeding stopped.  No further episodes.  Denies history of CHF, cellulitis or DVT.    Bilateral complete venous reflux study, 03/05/2024, shows no evidence of DVT bilaterally.  Deep venous reflux noted on the left.  The study also shows dilation and axial reflux of the bilateral great saphenous veins.  Area of pre rupture noted in the right medial Gator region/ankle.  Pathological calf  also noted on the left.    Venous Disease Medical Necessity Documentation Initial Visit Date:3/5/24 Return Check Date:     Have you ever had a rupture or bleed from a varicose vein in your leg(s)?              [] Yes  [x] No   [] Yes   [] No   Have you ever been diagnosed with phlebitis, cellulitis, or inflammation in the area of the varicose veins of  your leg(s)?  [] Yes  [x] No    [] Yes   [] No   Do you have darkened or inflamed skin on your legs?   [x] Yes   [] No   [] Yes   [] No   Do you have leg swelling?     [x] Yes   [] No   [] Yes   [] No   Do you have leg pain?   [x] Yes   [] No   [] Yes   [] No   If yes, describe the type of pain?    [x]   Stabbing [x]  Radiating [x]  Aching   [x]  Tightness []  Throbbing               [x]  Burning [x]  Cramping              Do you have leg discomfort?   [x] Yes   [] No   [] Yes   [] No   If yes, describe the type of discomfort?    [x]  Heaviness [x]  Fullness   [x]  Restlessness [x] Tired/Fatigued [x] Itching              Have you ever worn compression hose?   [] Yes   [x] No   [] Yes   [] No   If yes, how long?           Do you elevate your legs while sitting?   [x] Yes   [] No   [] Yes   [] No   Does venous disease (varicose veins, ulcers, skin changes, leg pain/swelling) interfere with your daily life?  If yes, check activities you are limited or unable to do.    []  Shower  [x]   Walk  []  Exercise  [] Play with children/grandchildren  []  Shop [] Work [x] Stand for any period of time [x] Sleep                               [x] Sitting for an extended period of time.           [x] Yes   [] No   [] Yes   [] No   Do you exercise/have you tried to exercise (i.e.  Walk our participate in a regular exercise routine)?  [] Yes  [x] No   [] Yes   [] No   BMI 40.5             Past Medical History:   Diagnosis Date    Anemia     Arthritis     Asthma     Breast cancer screening by mammogram 09/07/2023    [facility]    Encounter for long-term (current) use of other medications     Encounter for screening colonoscopy 05/13/2015    Essential hypertension     Goiter     Gout     H/O bone density study  02/27/2020    History of esophagogastroduodenoscopy (EGD) 01/2020    Hypokalemia     Hypothyroidism     Insomnia     Pre-diabetes     Screening for malignant neoplasm of cervix 01/20/2020    Screening mammogram, encounter for 07/16/2021    [facility]/negative exam/scattered fibroglandular densities/recommended anual screening    Screening mammogram, encounter for 06/11/2020        Past Surgical History:   Procedure Laterality Date    ABLATION, CHEMICAL SEALANT, VARICOSE VEIN Right 4/12/2024    Procedure: ABLATION,CHEMICAL SEALANT,VARICOSE VEI;  Surgeon: Darron Alaniz DO;  Location: [facility] OR;  Service: Peripheral Vascular;  Laterality: Right;    HYSTERECTOMY      TUBAL LIGATION         Social History     Tobacco Use   Smoking Status Never    Passive exposure: Never   Smokeless Tobacco Never         Current Outpatient Medications:     albuterol (PROVENTIL/VENTOLIN HFA) 90 mcg/actuation inhaler, Inhale 2 puffs into the lungs every 4 (four) hours as needed for Wheezing. Rescue, Disp: 25.5 g, Rfl: 3    allopurinoL (ZYLOPRIM) 100 MG tablet, TAKE 2 TABLETS BY MOUTH ONCE DAILY, Disp: 180 tablet, Rfl: 3    amLODIPine (NORVASC) 5 MG tablet, Take 1 tablet by mouth once daily, Disp: 90 tablet, Rfl: 3    aspirin 325 MG tablet, Take 1 tablet (325 mg total) by mouth once daily., Disp: 30 tablet, Rfl: 0    fluconazole (DIFLUCAN) 200 MG Tab, Take 1 tablet every 3 days as needed for yeast infection symptoms., Disp: 10 tablet, Rfl: 5    ibuprofen (ADVIL,MOTRIN) 200 MG tablet, Take 200 mg by mouth every 6 (six) hours as needed for Pain., Disp: , Rfl:     levothyroxine (SYNTHROID) 75 MCG tablet, TAKE 1 TABLET BY MOUTH EVERY DAY, Disp: 90 tablet, Rfl: 3    losartan (COZAAR) 100 MG tablet, Take 1 tablet by mouth once daily, Disp: 90 tablet, Rfl: 3    montelukast (SINGULAIR) 10 mg tablet, TAKE 1 TABLET BY MOUTH EVERY DAY, Disp: 90 tablet, Rfl: 3    naproxen (NAPROSYN) 500 MG tablet, Take 1 tablet (500 mg total) by mouth 2 (two)  times daily as needed (pain)., Disp: 180 tablet, Rfl: 3    potassium chloride (KLOR-CON) 10 MEQ TbSR, Take 1 tablet (10 mEq total) by mouth 2 (two) times daily., Disp: 180 tablet, Rfl: 3    senna-docusate 8.6-50 mg (PERICOLACE) 8.6-50 mg per tablet, Take 1 tablet by mouth 2 (two) times daily., Disp: 60 tablet, Rfl: 2    spironolactone (ALDACTONE) 25 MG tablet, Take 1 tablet (25 mg total) by mouth once daily., Disp: 90 tablet, Rfl: 3    zolpidem (AMBIEN) 10 mg Tab, Take 1 tablet (10 mg total) by mouth nightly as needed (insomnia)., Disp: 90 tablet, Rfl: 1  No current facility-administered medications for this visit.    Facility-Administered Medications Ordered in Other Visits:     LIDOcaine HCL 10 mg/ml (1%) injection 1 mL, 1 mL, Other, Once, Darron Alaniz, DO    Review of Systems   Constitutional:  Negative for activity change, chills, diaphoresis, fatigue and fever.   Respiratory:  Negative for cough and shortness of breath.    Cardiovascular:  Positive for leg swelling. Negative for chest pain and claudication.        Hyperpigmentation LE   Gastrointestinal:  Negative for nausea and vomiting.   Musculoskeletal:  Positive for leg pain. Negative for joint swelling.   Integumentary:  Negative for rash and wound.   Neurological:  Negative for weakness and numbness.          II. PHYSICAL EXAM     Physical Exam  Constitutional:       General: She is awake. She is not in acute distress.     Appearance: Normal appearance. She is obese. She is not ill-appearing or toxic-appearing.   HENT:      Head: Normocephalic and atraumatic.   Eyes:      Extraocular Movements: Extraocular movements intact.      Conjunctiva/sclera: Conjunctivae normal.      Pupils: Pupils are equal, round, and reactive to light.   Neck:      Vascular: No carotid bruit or JVD.   Cardiovascular:      Rate and Rhythm: Normal rate and regular rhythm.      Pulses:           Dorsalis pedis pulses are detected w/ Doppler on the right side and detected w/  Doppler on the left side.        Posterior tibial pulses are detected w/ Doppler on the right side and detected w/ Doppler on the left side.      Heart sounds: No murmur heard.  Pulmonary:      Effort: Pulmonary effort is normal. No respiratory distress.      Breath sounds: No stridor. No wheezing, rhonchi or rales.   Musculoskeletal:         General: No swelling, tenderness or deformity.      Right lower le+ Edema present.      Left lower le+ Edema present.      Comments: Edema with hyperpigmentation of the bilateral lower extremities without evidence of acute cellulitis or open ulceration.   Feet:      Comments: Triphasic hand-held dopplerable pulses of the bilateral dorsalis pedis and posterior tibial arteries.  Skin:     General: Skin is warm.      Capillary Refill: Capillary refill takes less than 2 seconds.      Coloration: Skin is not ashen.      Findings: No bruising, erythema, lesion, rash or wound.   Neurological:      Mental Status: She is alert and oriented to person, place, and time.      Motor: No weakness.   Psychiatric:         Speech: Speech normal.         Behavior: Behavior normal. Behavior is cooperative.         Reticular/Spider veins noted:  RLE: medial calf, ankle, and foot  LLE: medial calf, ankle, and foot    Varicose veins noted:  RLE: medial calf and ankle  LLE:  medial calf and ankle    CEAP Classification  Clinical Signs: Class 4 - Skin changes ascribed to venous disease  Etiologic Classification: Primary  Anatomic distribution: Superficial  Pathophysiologic dysfunction: Reflux                       Venous Clinical Severity Score  Pain:2=Daily, moderate activity limitation, occasional analgesics  Varicose Veins: 1=Few, scattered. Branch varicose veins  Venous Edema: 2=Afternoon edema, above ankle  Pigmentation: 2=Diffuse over most of gaiter distribution (lower 1/3) or recent pigmentation (purple)  Inflammation: 0=None  Induration: 0=None  Number of Active Ulcers: 0=0  Active  Ulceration, Duration: 0=None  Active Ulcer Size: 0=None  Compressive Therapy: 3=Full compliance, stockings + elevation  Total Score: 10       III. ASSESSMENT & PLAN (MEDICAL DECISION MAKING)     1. Venous insufficiency    2. Varicose veins of bilateral lower extremities with pain    3. Pain in both lower extremities        Assessment/Diagnosis and Plan:  The patient is continue with knee-high 20-30 millimeter of mercury compression stockings for daily wear.    Patient also noted to have chronic venous insufficiency in her left lower extremity.  We will follow-up on this that her three-month post ablation follow-up along with any symptomatic varicosities of the right lower extremity.          Darron Alaniz, DO

## 2024-05-31 ENCOUNTER — EXTERNAL CHRONIC CARE MANAGEMENT (OUTPATIENT)
Dept: FAMILY MEDICINE | Facility: CLINIC | Age: 74
End: 2024-05-31
Payer: MEDICARE

## 2024-05-31 PROCEDURE — G0511 CCM/BHI BY RHC/FQHC 20MIN MO: HCPCS | Mod: ,,, | Performed by: FAMILY MEDICINE

## 2024-07-15 ENCOUNTER — OFFICE VISIT (OUTPATIENT)
Dept: VASCULAR SURGERY | Facility: CLINIC | Age: 74
End: 2024-07-15
Payer: MEDICARE

## 2024-07-15 VITALS
RESPIRATION RATE: 16 BRPM | DIASTOLIC BLOOD PRESSURE: 93 MMHG | BODY MASS INDEX: 41.15 KG/M2 | SYSTOLIC BLOOD PRESSURE: 159 MMHG | HEIGHT: 65 IN | HEART RATE: 92 BPM | WEIGHT: 247 LBS

## 2024-07-15 DIAGNOSIS — M79.605 PAIN IN BOTH LOWER EXTREMITIES: ICD-10-CM

## 2024-07-15 DIAGNOSIS — M79.604 PAIN IN BOTH LOWER EXTREMITIES: ICD-10-CM

## 2024-07-15 DIAGNOSIS — I87.2 VENOUS INSUFFICIENCY: Primary | ICD-10-CM

## 2024-07-15 DIAGNOSIS — I87.2 VENOUS INSUFFICIENCY (CHRONIC) (PERIPHERAL): ICD-10-CM

## 2024-07-15 DIAGNOSIS — I83.813 VARICOSE VEINS OF BILATERAL LOWER EXTREMITIES WITH PAIN: ICD-10-CM

## 2024-07-15 PROCEDURE — 99215 OFFICE O/P EST HI 40 MIN: CPT | Mod: PBBFAC | Performed by: FAMILY MEDICINE

## 2024-07-15 PROCEDURE — 99213 OFFICE O/P EST LOW 20 MIN: CPT | Mod: S$PBB,,, | Performed by: FAMILY MEDICINE

## 2024-07-15 PROCEDURE — 99999 PR PBB SHADOW E&M-EST. PATIENT-LVL V: CPT | Mod: PBBFAC,,, | Performed by: FAMILY MEDICINE

## 2024-07-15 RX ORDER — SODIUM CHLORIDE 9 MG/ML
INJECTION, SOLUTION INTRAVENOUS CONTINUOUS
OUTPATIENT
Start: 2024-09-13

## 2024-07-15 NOTE — PATIENT INSTRUCTIONS
Pre Prodedure Information    Procedure (s) and Date (s):  1. Right leg microphlebectomies 10-20 - 09/13/2024    Do not eat or drink anything after midnight.  You may take medications Amlodipine, Synthroid and Losartan with a small sip of water morning of procedure.  We will call you the day prior with your time to report for surgery.  You will check in at Ambulatory Surgery.    Shower prior to procedure as your leg will be wrapped for 48 hours and you will not be able to shower.  Do not wear lotion, oil, or cream on you legs on the day of your procedure.  This will cause the Doctor's venkat to fade.  Wear shorts, skirt, or loose pants and larger shoes (house shoes).   Bring a pillow or two and leave in the car (to prop your leg).  Bring someone with you to drive, stay during the procedure, and drive you home.  Someone will also need to stay with you at home the first night.  No driving for 24 hours after sedation.  Prepare to walk 15 minutes out of each hour for the first 48 hours post op.  Prepare to keep your legs propped up while sitting for the first 48 hours or 2 days following your procedure (recliner, couch, or chair).  Dressings will remain on your legs for at least 48 hours or 2 days after procedure.  Full discharge instructions will be provided on the day of your procedure.  Typically, you are in and out within a few hours.  We will follow you postoperatively with a 4 day post ablation ultrasound and then a 1 month, 3 month, 6 month, and 1 year post ablation visit with the physician or nurse practitioner with or without an ultrasound.  Please make every effort to attend these appointments as they will be essential to following you progress.  Please kindly notify us as soon as possible if you need to reschedule your appointment.  As always, we look forward to caring for you and are happy to answer your questions.  Please call us at [phone].

## 2024-07-15 NOTE — PROGRESS NOTES
VEIN CENTER CLINIC NOTE    Patient ID: Janay Reveles is a 73 y.o. female.    I. HISTORY     Chief Complaint:   Chief Complaint   Patient presents with    Follow-up     3M DARIO DOMINGUEZ        HPI: Janay Reveles is a 73 y.o. female who presents today for a 3 month follow-up after undergoing a right great saphenous vein non thermal adhesive ablation.  She states that she is done well since the procedure and has not had any further bleed from her previously ruptured varicose vein.  She also states the leg feels lighter and less tight.  She does admit that she has some symptomatic itching/burning varicosities of her right lower extremity.  She feels as though these symptoms have become life altering would like to have the symptomatic varicose veins removed if possible.    Left lower extremity continues to be edematous and uncomfortable.  She also has known chronic venous insufficiency of the left lower extremity which she is currently undergoing conservative therapy which was started on 03/05/2024.    Clinical summary  Patient initially seen on 03/05/2024 by Milady GOMEZ for evaluation of bilateral lower extremity swelling, hyperpigmentation and pain.  Symptoms are progressive/worsening and began about 3-4 years ago.  Location is bilateral lower extremities below the knees. Symptoms are worse at the end of the day.  History of venous interventions includes none.  Positive, Grandmother family history of venous disease.  Patient states that she had a rupture of a vein in her left medial ankle one-week ago.  States she held pressure until the bleeding stopped.  No further episodes.  Denies history of CHF, cellulitis or DVT.    Bilateral complete venous reflux study, 03/05/2024, shows no evidence of DVT bilaterally.  Deep venous reflux noted on the left.  The study also shows dilation and axial reflux of the bilateral great saphenous veins.  Area of pre rupture noted in the right medial Gator region/ankle.   Pathological calf  also noted on the left.    Venous Disease Medical Necessity Documentation Initial Visit Date:3/5/24 Return Check Date:    Have you ever had a rupture or bleed from a varicose vein in your leg(s)?              [] Yes  [x] No   [] Yes   [] No   Have you ever been diagnosed with phlebitis, cellulitis, or inflammation in the area of the varicose veins of  your leg(s)?  [] Yes  [x] No    [] Yes   [] No   Do you have darkened or inflamed skin on your legs?   [x] Yes   [] No   [] Yes   [] No   Do you have leg swelling?     [x] Yes   [] No   [] Yes   [] No   Do you have leg pain?   [x] Yes   [] No   [] Yes   [] No   If yes, describe the type of pain?    [x]   Stabbing [x]  Radiating [x]  Aching   [x]  Tightness []  Throbbing               [x]  Burning [x]  Cramping              Do you have leg discomfort?   [x] Yes   [] No   [] Yes   [] No   If yes, describe the type of discomfort?    [x]  Heaviness [x]  Fullness   [x]  Restlessness [x] Tired/Fatigued [x] Itching              Have you ever worn compression hose?   [] Yes   [x] No   [] Yes   [] No   If yes, how long?           Do you elevate your legs while sitting?   [x] Yes   [] No   [] Yes   [] No   Does venous disease (varicose veins, ulcers, skin changes, leg pain/swelling) interfere with your daily life?  If yes, check activities you are limited or unable to do.    []  Shower  [x]   Walk  []  Exercise  [] Play with children/grandchildren  []  Shop [] Work [x] Stand for any period of time [x] Sleep                               [x] Sitting for an extended period of time.           [x] Yes   [] No   [] Yes   [] No   Do you exercise/have you tried to exercise (i.e.  Walk our participate in a regular exercise routine)?  [] Yes  [x] No   [] Yes   [] No   BMI 40.5             Past Medical History:   Diagnosis Date    Anemia     Arthritis     Asthma     Breast cancer screening by mammogram 09/07/2023    [facility]    Encounter for long-term (current)  use of other medications     Encounter for screening colonoscopy 05/13/2015    Essential hypertension     Goiter     Gout     H/O bone density study 02/27/2020    History of esophagogastroduodenoscopy (EGD) 01/2020    Hypokalemia     Hypothyroidism     Insomnia     Pre-diabetes     Screening for malignant neoplasm of cervix 01/20/2020    Screening mammogram, encounter for 07/16/2021    [facility]/negative exam/scattered fibroglandular densities/recommended anual screening    Screening mammogram, encounter for 06/11/2020        Past Surgical History:   Procedure Laterality Date    ABLATION, CHEMICAL SEALANT, VARICOSE VEIN Right 4/12/2024    Procedure: ABLATION,CHEMICAL SEALANT,VARICOSE VEI;  Surgeon: Darron Alaniz DO;  Location: [facility], [place];  Service: Peripheral Vascular;  Laterality: Right;    HYSTERECTOMY      TUBAL LIGATION         Social History     Tobacco Use   Smoking Status Never    Passive exposure: Never   Smokeless Tobacco Never         Current Outpatient Medications:     albuterol (PROVENTIL/VENTOLIN HFA) 90 mcg/actuation inhaler, Inhale 2 puffs into the lungs every 4 (four) hours as needed for Wheezing. Rescue, Disp: 25.5 g, Rfl: 3    allopurinoL (ZYLOPRIM) 100 MG tablet, TAKE 2 TABLETS BY MOUTH ONCE DAILY, Disp: 180 tablet, Rfl: 3    amLODIPine (NORVASC) 5 MG tablet, Take 1 tablet by mouth once daily, Disp: 90 tablet, Rfl: 3    fluconazole (DIFLUCAN) 200 MG Tab, Take 1 tablet every 3 days as needed for yeast infection symptoms., Disp: 10 tablet, Rfl: 5    ibuprofen (ADVIL,MOTRIN) 200 MG tablet, Take 200 mg by mouth every 6 (six) hours as needed for Pain., Disp: , Rfl:     levothyroxine (SYNTHROID) 75 MCG tablet, TAKE 1 TABLET BY MOUTH EVERY DAY, Disp: 90 tablet, Rfl: 3    losartan (COZAAR) 100 MG tablet, Take 1 tablet by mouth once daily, Disp: 90 tablet, Rfl: 3    montelukast (SINGULAIR) 10 mg tablet, TAKE 1 TABLET BY MOUTH EVERY DAY, Disp: 90 tablet, Rfl: 3    naproxen (NAPROSYN) 500 MG tablet,  Take 1 tablet (500 mg total) by mouth 2 (two) times daily as needed (pain)., Disp: 180 tablet, Rfl: 3    potassium chloride (KLOR-CON) 10 MEQ TbSR, Take 1 tablet (10 mEq total) by mouth 2 (two) times daily., Disp: 180 tablet, Rfl: 3    senna-docusate 8.6-50 mg (PERICOLACE) 8.6-50 mg per tablet, Take 1 tablet by mouth 2 (two) times daily., Disp: 60 tablet, Rfl: 2    spironolactone (ALDACTONE) 25 MG tablet, Take 1 tablet (25 mg total) by mouth once daily., Disp: 90 tablet, Rfl: 3    zolpidem (AMBIEN) 10 mg Tab, Take 1 tablet (10 mg total) by mouth nightly as needed (insomnia)., Disp: 90 tablet, Rfl: 1    aspirin 325 MG tablet, Take 1 tablet (325 mg total) by mouth once daily., Disp: 30 tablet, Rfl: 0  No current facility-administered medications for this visit.    Facility-Administered Medications Ordered in Other Visits:     LIDOcaine HCL 10 mg/ml (1%) injection 1 mL, 1 mL, Other, Once, Darron Alaniz, DO    Review of Systems   Constitutional:  Negative for activity change, chills, diaphoresis, fatigue and fever.   Respiratory:  Negative for cough and shortness of breath.    Cardiovascular:  Positive for leg swelling. Negative for chest pain and claudication.        Hyperpigmentation LE   Gastrointestinal:  Negative for nausea and vomiting.   Musculoskeletal:  Positive for leg pain. Negative for joint swelling.   Integumentary:  Negative for rash and wound.   Neurological:  Negative for weakness and numbness.          II. PHYSICAL EXAM     Physical Exam  Constitutional:       General: She is awake. She is not in acute distress.     Appearance: Normal appearance. She is obese. She is not ill-appearing or toxic-appearing.   HENT:      Head: Normocephalic and atraumatic.   Eyes:      Extraocular Movements: Extraocular movements intact.      Conjunctiva/sclera: Conjunctivae normal.      Pupils: Pupils are equal, round, and reactive to light.   Neck:      Vascular: No carotid bruit or JVD.   Cardiovascular:      Rate  and Rhythm: Normal rate and regular rhythm.      Pulses:           Dorsalis pedis pulses are detected w/ Doppler on the right side and detected w/ Doppler on the left side.        Posterior tibial pulses are detected w/ Doppler on the right side and detected w/ Doppler on the left side.      Heart sounds: No murmur heard.  Pulmonary:      Effort: Pulmonary effort is normal. No respiratory distress.      Breath sounds: No stridor. No wheezing, rhonchi or rales.   Musculoskeletal:         General: No swelling, tenderness or deformity.      Right lower le+ Edema present.      Left lower le+ Edema present.      Comments: Edema with hyperpigmentation of the bilateral lower extremities without evidence of acute cellulitis or open ulceration.   Feet:      Comments: Triphasic hand-held dopplerable pulses of the bilateral dorsalis pedis and posterior tibial arteries.  Skin:     General: Skin is warm.      Capillary Refill: Capillary refill takes less than 2 seconds.      Coloration: Skin is not ashen.      Findings: No bruising, erythema, lesion, rash or wound.   Neurological:      Mental Status: She is alert and oriented to person, place, and time.      Motor: No weakness.   Psychiatric:         Speech: Speech normal.         Behavior: Behavior normal. Behavior is cooperative.         Reticular/Spider veins noted:  RLE: medial calf, ankle, and foot  LLE: medial calf, ankle, and foot    Varicose veins noted:  RLE: medial calf and ankle  LLE:  medial calf and ankle    CEAP Classification  Clinical Signs: Class 4 - Skin changes ascribed to venous disease  Etiologic Classification: Primary  Anatomic distribution: Superficial  Pathophysiologic dysfunction: Reflux                       Venous Clinical Severity Score  Pain:2=Daily, moderate activity limitation, occasional analgesics  Varicose Veins: 1=Few, scattered. Branch varicose veins  Venous Edema: 2=Afternoon edema, above ankle  Pigmentation: 2=Diffuse over most of  gaiter distribution (lower 1/3) or recent pigmentation (purple)  Inflammation: 0=None  Induration: 0=None  Number of Active Ulcers: 0=0  Active Ulceration, Duration: 0=None  Active Ulcer Size: 0=None  Compressive Therapy: 3=Full compliance, stockings + elevation  Total Score: 10       III. ASSESSMENT & PLAN (MEDICAL DECISION MAKING)     1. Venous insufficiency    2. Varicose veins of bilateral lower extremities with pain    3. Pain in both lower extremities          Assessment/Diagnosis and Plan:  The patient is continue with knee-high 20-30 millimeter of mercury compression stockings for daily wear.    The patient continues to have sequela of chronic venous insufficiency despite over 3 months of conservative therapy. The patient continues to have life altering symptoms as well as a positive reflux study as noted in the history of present illness above. At this time I believe it would be reasonable to proceed with right 10-20 micro phlebectomies for symptomatic venous insufficiency.  Untreated venous disease increases the patient's risk for cellulitis, deep vein thrombosis, chronic skin changes and venous ulceration.  Risk and benefits of the procedure were explained to the patient and the patient wishes to proceed. The patient does not have overly distended veins and denies history of DVT/PE and will not require prophylactic anticoagulation.       Darron Alaniz, DO

## 2024-08-31 ENCOUNTER — EXTERNAL CHRONIC CARE MANAGEMENT (OUTPATIENT)
Dept: FAMILY MEDICINE | Facility: CLINIC | Age: 74
End: 2024-08-31
Payer: MEDICARE

## 2024-08-31 PROCEDURE — G0511 CCM/BHI BY RHC/FQHC 20MIN MO: HCPCS | Mod: ,,, | Performed by: FAMILY MEDICINE

## 2024-09-06 DIAGNOSIS — I10 ESSENTIAL HYPERTENSION: Chronic | ICD-10-CM

## 2024-09-06 RX ORDER — LOSARTAN POTASSIUM 100 MG/1
100 TABLET ORAL
Qty: 90 TABLET | Refills: 0 | Status: SHIPPED | OUTPATIENT
Start: 2024-09-06

## 2024-09-07 DIAGNOSIS — E03.9 HYPOTHYROIDISM, UNSPECIFIED TYPE: Chronic | ICD-10-CM

## 2024-09-09 RX ORDER — LEVOTHYROXINE SODIUM 75 UG/1
75 TABLET ORAL
Qty: 90 TABLET | Refills: 3 | Status: SHIPPED | OUTPATIENT
Start: 2024-09-09

## 2024-09-12 DIAGNOSIS — E87.6 HYPOKALEMIA: ICD-10-CM

## 2024-09-12 RX ORDER — POTASSIUM CHLORIDE 750 MG/1
10 TABLET, EXTENDED RELEASE ORAL 2 TIMES DAILY
Qty: 180 TABLET | Refills: 3 | Status: SHIPPED | OUTPATIENT
Start: 2024-09-12

## 2024-09-13 ENCOUNTER — ANESTHESIA (OUTPATIENT)
Dept: PAIN MEDICINE | Facility: HOSPITAL | Age: 74
End: 2024-09-13
Payer: MEDICARE

## 2024-09-13 ENCOUNTER — HOSPITAL ENCOUNTER (OUTPATIENT)
Facility: HOSPITAL | Age: 74
Discharge: HOME | End: 2024-09-13
Attending: FAMILY MEDICINE | Admitting: FAMILY MEDICINE
Payer: MEDICARE

## 2024-09-13 ENCOUNTER — ANESTHESIA EVENT (OUTPATIENT)
Dept: PAIN MEDICINE | Facility: HOSPITAL | Age: 74
End: 2024-09-13
Payer: MEDICARE

## 2024-09-13 VITALS
SYSTOLIC BLOOD PRESSURE: 145 MMHG | DIASTOLIC BLOOD PRESSURE: 87 MMHG | OXYGEN SATURATION: 99 % | HEART RATE: 68 BPM | RESPIRATION RATE: 16 BRPM | TEMPERATURE: 98 F | HEIGHT: 65 IN | WEIGHT: 251 LBS | BODY MASS INDEX: 41.82 KG/M2

## 2024-09-13 DIAGNOSIS — I87.2 VENOUS INSUFFICIENCY: Primary | ICD-10-CM

## 2024-09-13 PROCEDURE — 37000009 HC ANESTHESIA EA ADD 15 MINS: Performed by: FAMILY MEDICINE

## 2024-09-13 PROCEDURE — 27000284 HC CANNULA NASAL

## 2024-09-13 PROCEDURE — 25000003 PHARM REV CODE 250: Performed by: FAMILY MEDICINE

## 2024-09-13 PROCEDURE — 37765 STAB PHLEB VEINS XTR 10-20: CPT | Performed by: FAMILY MEDICINE

## 2024-09-13 PROCEDURE — 25000003 PHARM REV CODE 250

## 2024-09-13 PROCEDURE — 63600175 PHARM REV CODE 636 W HCPCS

## 2024-09-13 PROCEDURE — 37765 STAB PHLEB VEINS XTR 10-20: CPT | Mod: RT,,, | Performed by: FAMILY MEDICINE

## 2024-09-13 PROCEDURE — 37000008 HC ANESTHESIA 1ST 15 MINUTES: Performed by: FAMILY MEDICINE

## 2024-09-13 RX ORDER — SODIUM CHLORIDE 9 MG/ML
INJECTION, SOLUTION INTRAVENOUS CONTINUOUS
Status: DISCONTINUED | OUTPATIENT
Start: 2024-09-13 | End: 2024-09-13 | Stop reason: HOSPADM

## 2024-09-13 RX ORDER — ONDANSETRON 4 MG/1
4 TABLET, FILM COATED ORAL EVERY 8 HOURS PRN
Qty: 15 TABLET | Refills: 0 | Status: SHIPPED | OUTPATIENT
Start: 2024-09-13

## 2024-09-13 RX ORDER — MUPIROCIN 20 MG/G
OINTMENT TOPICAL CODE/TRAUMA/SEDATION MEDICATION
Status: DISCONTINUED | OUTPATIENT
Start: 2024-09-13 | End: 2024-09-13 | Stop reason: HOSPADM

## 2024-09-13 RX ORDER — FENTANYL CITRATE 50 UG/ML
INJECTION, SOLUTION INTRAMUSCULAR; INTRAVENOUS
Status: DISCONTINUED | OUTPATIENT
Start: 2024-09-13 | End: 2024-09-13

## 2024-09-13 RX ORDER — PROPOFOL 10 MG/ML
VIAL (ML) INTRAVENOUS
Status: DISCONTINUED | OUTPATIENT
Start: 2024-09-13 | End: 2024-09-13

## 2024-09-13 RX ORDER — LIDOCAINE HYDROCHLORIDE 20 MG/ML
INJECTION, SOLUTION EPIDURAL; INFILTRATION; INTRACAUDAL; PERINEURAL
Status: DISCONTINUED | OUTPATIENT
Start: 2024-09-13 | End: 2024-09-13

## 2024-09-13 RX ORDER — HYDROCODONE BITARTRATE AND ACETAMINOPHEN 7.5; 325 MG/1; MG/1
1 TABLET ORAL EVERY 6 HOURS PRN
Qty: 15 TABLET | Refills: 0 | Status: SHIPPED | OUTPATIENT
Start: 2024-09-13

## 2024-09-13 RX ORDER — PHENYLEPHRINE HYDROCHLORIDE 10 MG/ML
INJECTION INTRAVENOUS
Status: DISCONTINUED | OUTPATIENT
Start: 2024-09-13 | End: 2024-09-13

## 2024-09-13 RX ADMIN — PROPOFOL 20 MG: 10 INJECTION, EMULSION INTRAVENOUS at 07:09

## 2024-09-13 RX ADMIN — PROPOFOL 30 MG: 10 INJECTION, EMULSION INTRAVENOUS at 07:09

## 2024-09-13 RX ADMIN — SODIUM CHLORIDE: 9 INJECTION, SOLUTION INTRAVENOUS at 07:09

## 2024-09-13 RX ADMIN — PROPOFOL 40 MG: 10 INJECTION, EMULSION INTRAVENOUS at 07:09

## 2024-09-13 RX ADMIN — LIDOCAINE HYDROCHLORIDE 100 MG: 20 INJECTION, SOLUTION EPIDURAL; INFILTRATION; INTRACAUDAL; PERINEURAL at 07:09

## 2024-09-13 RX ADMIN — FENTANYL CITRATE 100 MCG: 50 INJECTION, SOLUTION INTRAMUSCULAR; INTRAVENOUS at 07:09

## 2024-09-13 RX ADMIN — PHENYLEPHRINE HYDROCHLORIDE 100 MCG: 10 INJECTION INTRAVENOUS at 07:09

## 2024-09-13 NOTE — ANESTHESIA PREPROCEDURE EVALUATION
09/13/2024  Janay Reveles is a 73 y.o., female.  Current Facility-Administered Medications on File Prior to Encounter   Medication Dose Route Frequency Provider Last Rate Last Admin    LIDOcaine HCL 10 mg/ml (1%) injection 1 mL  1 mL Other Once Darron Alaniz, DO         Current Outpatient Medications on File Prior to Encounter   Medication Sig Dispense Refill    albuterol (PROVENTIL/VENTOLIN HFA) 90 mcg/actuation inhaler Inhale 2 puffs into the lungs every 4 (four) hours as needed for Wheezing. Rescue 25.5 g 3    allopurinoL (ZYLOPRIM) 100 MG tablet TAKE 2 TABLETS BY MOUTH ONCE DAILY 180 tablet 3    amLODIPine (NORVASC) 5 MG tablet Take 1 tablet by mouth once daily 90 tablet 3    ibuprofen (ADVIL,MOTRIN) 200 MG tablet Take 200 mg by mouth every 6 (six) hours as needed for Pain.      montelukast (SINGULAIR) 10 mg tablet TAKE 1 TABLET BY MOUTH EVERY DAY 90 tablet 3    naproxen (NAPROSYN) 500 MG tablet Take 1 tablet (500 mg total) by mouth 2 (two) times daily as needed (pain). 180 tablet 3    senna-docusate 8.6-50 mg (PERICOLACE) 8.6-50 mg per tablet Take 1 tablet by mouth 2 (two) times daily. 60 tablet 2    spironolactone (ALDACTONE) 25 MG tablet Take 1 tablet (25 mg total) by mouth once daily. 90 tablet 3    zolpidem (AMBIEN) 10 mg Tab Take 1 tablet (10 mg total) by mouth nightly as needed (insomnia). 90 tablet 1    aspirin 325 MG tablet Take 1 tablet (325 mg total) by mouth once daily. 30 tablet 0    fluconazole (DIFLUCAN) 200 MG Tab Take 1 tablet every 3 days as needed for yeast infection symptoms. 10 tablet 5     Active Ambulatory Problems     Diagnosis Date Noted    Hypothyroidism     Vitamin D deficiency 10/14/2021    Essential hypertension 10/14/2021    Anemia 10/14/2021    Osteoarthritis of left knee 04/19/2022    Gout 07/18/2022    Morbid obesity 07/18/2022    Bilateral lower extremity edema  07/18/2022    Hyperglycemia 07/19/2022    Pre-diabetes 10/08/2023    Chronic kidney disease, stage 3a 11/25/2023    Other insomnia 11/25/2023    Thrombocytosis 11/25/2023    Varicose veins of bilateral lower extremities with pain 02/26/2024    Asymptomatic ruptured varicose vein of right lower extremity 02/26/2024    Venous insufficiency 03/05/2024    Pain in both lower extremities 03/05/2024    Hyperpigmentation of skin 03/05/2024     Resolved Ambulatory Problems     Diagnosis Date Noted    No Resolved Ambulatory Problems     Past Medical History:   Diagnosis Date    Arthritis     Asthma     Breast cancer screening by mammogram 09/07/2023    Encounter for long-term (current) use of other medications     Encounter for screening colonoscopy 05/13/2015    Goiter     H/O bone density study 02/27/2020    History of esophagogastroduodenoscopy (EGD) 01/2020    Hypokalemia     Insomnia     Screening for malignant neoplasm of cervix 01/20/2020    Screening mammogram, encounter for 07/16/2021    Screening mammogram, encounter for 06/11/2020         Pre-op Assessment    I have reviewed the Patient Summary Reports.     I have reviewed the Nursing Notes. I have reviewed the NPO Status.   I have reviewed the Medications.     Review of Systems  Anesthesia Hx:  No problems with previous Anesthesia             Denies Family Hx of Anesthesia complications.    Denies Personal Hx of Anesthesia complications.                    Social:  No Alcohol Use, Non-Smoker       Hematology/Oncology:  Hematology Normal   Oncology Normal                                   EENT/Dental:  EENT/Dental Normal           Cardiovascular:     Hypertension              ECG has been reviewed. Venous insufficiency                          Pulmonary:    Asthma                    Renal/:  Chronic Renal Disease                Hepatic/GI:  Hepatic/GI Normal                 Musculoskeletal:  Arthritis               Neurological:  Neurology Normal                                       Endocrine:   Hypothyroidism        Morbid Obesity / BMI > 40  Dermatological:  Skin Normal    Psych:  Psychiatric Normal                    Chemistry        Component Value Date/Time     04/05/2024 0859    K 3.7 04/05/2024 0859     (H) 04/05/2024 0859    CO2 25 04/05/2024 0859    BUN 17 04/05/2024 0859    CREATININE 0.91 04/05/2024 0859     (H) 04/05/2024 0859        Component Value Date/Time    CALCIUM 9.5 04/05/2024 0859    ALKPHOS 91 04/05/2024 0859    AST 6 (L) 04/05/2024 0859    ALT 13 04/05/2024 0859    BILITOT 0.3 04/05/2024 0859    ESTGFRAFRICA 67 10/14/2021 0946    EGFRNONAA 39 (L) 07/19/2022 0420        Lab Results   Component Value Date    WBC 8.55 04/05/2024    HGB 10.8 (L) 04/05/2024    HCT 34.8 (L) 04/05/2024    MCV 89.7 04/05/2024     (H) 04/05/2024         Physical Exam  General: Well nourished, Cooperative, Alert and Oriented    Airway:  Mallampati: III   Mouth Opening: Small, but > 3cm  TM Distance: Normal  Tongue: Normal  Neck ROM: Normal ROM    Dental:  Intact    Chest/Lungs:  Normal Respiratory Rate        Anesthesia Plan  Type of Anesthesia, risks & benefits discussed:    Anesthesia Type: MAC  Intra-op Monitoring Plan: Standard ASA Monitors  Post Op Pain Control Plan: multimodal analgesia  Induction:  IV  Informed Consent: Informed consent signed with the Patient and all parties understand the risks and agree with anesthesia plan.  All questions answered. Patient consented to blood products? Yes  ASA Score: 3  Day of Surgery Review of History & Physical: H&P Update referred to the surgeon/provider.I have interviewed and examined the patient. I have reviewed the patient's H&P dated: There are no significant changes.     Ready For Surgery From Anesthesia Perspective.     .

## 2024-09-13 NOTE — PLAN OF CARE
No driving, operating machinery, making legal decisions, or signing legal documents until tomorrow.   Continue diet as tolerated. Drink plenty of fluids and rest.   If unable to void in 8 hours proceed to nearest ER.   Notify MD of redness or drainage from incision site as well as any fever over the next 3-4 days.  No lifting over 5 lbs for the next 24 hrs.   Continue medications as prescribed. May take pain medication as prescribed.   May shower tomorrow. No tub baths for 48 hours following procedure.   May remove bandaids tomorrow, if they fall off before tomorrow they do not have to be replaced.    If you experience any uncontrolled pain, nausea or vomiting after your procedure, do not hesitate to call the clinic.  Pt reports no pain.  DC'ED via WC at 910.    845 ambulating in halls.  Tolerated well.

## 2024-09-13 NOTE — DISCHARGE INSTRUCTIONS
Vein Procedure Discharge Instructions    Today:  Someone must stay with you tonight.  No alcohol for at least 8 hours after your procedure.  No driving for 24 hours after your procedure if you receive sedation/anesthesia.    For the first 48 hours (2 days) following your procedure:  Walk 15 minutes on every hour (while awake).  Keep leg propped up (recliner, couch, etc.) while sitting.  Take pain medication, if prescribed.  Take Ibuprofen, or Acetaminophen, for the next 2-3 days with an over-the-counter anti-acid (Prilosec, Protonix, Nexium, etc.)    After your 48 hours (2 days) Post Op period complete:  Remove dressings, throw away all except the ACE wrap, keep the ACE wrap.  Shower using warm soapy water.  Use separate wash cloth on the treated leg.  No baths for 2 weeks.  Compression up to groin must be worn daily for 2 weeks.  You may use the ACE wrap for entire leg, or compression hose to knees and ACE wrap to groin.  (If you had your small saphenous vein ablated, then compression only to knee)  If your vein was ablated with Varithena (foam), compression must be worn day and night.  Walk for at least 10 minutes daily for the next month.    Activity:  If you have a normal ultrasound one week after your ablation:  You may resume walking activities immediately.  You may resume jogging 2 weeks after your procedure.  You may resume swimming 2 weeks after your procedure.  If you had microphlebectomies, you must make sure all areas are completely healed before swimming.  You may resume lower body weight lifting 2 weeks after your procedure.  You may resume upper body weight lifting while sitting down 2 days after your procedure.  You may resume sexual activities 2 weeks after your procedure.  You may fly commercially 2 weeks after your procedure.  You may scuba dive 1 month after your procedure.    Pilots - You may not fly for 1 month after your procedure.  You must have a normal 1 month post op ultrasound before  returning to flight status.    Your follow-up appointment is: _________________________________________________________    Please call our office at [phone] with any questions or concerns.  You may call the following number for after hour and weekend concerns: [phone] (Dr. Alaniz).    Patient Signature ______________________________________Date/time_______________________

## 2024-09-13 NOTE — DISCHARGE SUMMARY
[facility] - Pain Management  Discharge Note  Short Stay    Procedure(s) (LRB):  Right Leg Microphlebectomies 10-20 (Right)      OUTCOME: Patient tolerated treatment/procedure well without complication and is now ready for discharge.    DISPOSITION: Home or Self Care    FINAL DIAGNOSIS:  Venous insufficiency    FOLLOWUP: In clinic    DISCHARGE INSTRUCTIONS:  No discharge procedures on file.     TIME SPENT ON DISCHARGE: 5 minutes

## 2024-09-13 NOTE — ANESTHESIA POSTPROCEDURE EVALUATION
Anesthesia Post Evaluation    Patient: Janay Reveles    Procedure(s) Performed: Procedure(s) (LRB):  Right Leg Microphlebectomies 10-20 (Right)    Final Anesthesia Type: MAC      Patient location: [facility].  Patient participation: Yes- Able to Participate  Level of consciousness: awake and alert  Post-procedure vital signs: reviewed and stable  Pain management: adequate  Airway patency: patent    PONV status at discharge: No PONV  Anesthetic complications: no      Cardiovascular status: blood pressure returned to baseline, hemodynamically stable and stable  Respiratory status: unassisted  Hydration status: euvolemic  Follow-up not needed.  Comments: Pt voices appreciation for care              Vitals Value Taken Time   /90 09/13/24 0844   Temp 97. 6 F 09/13/24 1015   Pulse 68 09/13/24 0845   Resp 22 09/13/24 0845   SpO2 100 % 09/13/24 0845   Vitals shown include unfiled device data.      Event Time   Out of Recovery 09:00:00         Pain/Briana Score: Briana Score: 10 (9/13/2024  8:40 AM)

## 2024-09-13 NOTE — OP NOTE
Procedure:  Ambulatory phlebectomy of the right lower extremity.    Preprocedure and postprocedure diagnosis: Symptomatic varicose veins and venous insufficiency of the right  leg of the complications to include leg pain, leg edema and chronic skin changes.  The patient has previously failed conservative therapy consider a C4 chronic venous disease with venous hypertension.    Anesthesia: Local infiltration with monitored anesthesia care.  Estimated blood loss 15 cc.  Specimens removed- veins. Disposed of.   Complications none.  Implants or grafts: none.    Findings:  A total of 10 micro phlebectomy stab avulsions of symptomatic varicose veins of the right  leg were performed.    Procedure detail:  After patient identification, surgical site verification, and marked symptomatic Left leg, the patient was taken to the procedure room and placed in the supine position.  Monitored anesthesia care was induced.  The patient was prepped and draped in the normal sterile fashion leaving the right  leg exposed.  A time-out performed identifying the patient and the correct surgical site.  Tumescence anesthesia was infiltrated at all sites expected micro phlebectomies.  A small stab incision was made at each of the 10 sites with an 11 blade.  Sterile eber hooks and hemostats were used to perform stab avulsions of the symptomatic varicose veins.  Specimens that were removed were disposed of.  Sutures, Steri-Strips and Dermabond were used as appropriate for hemostasis.  The patient's leg was then cleaned and dried.  Sterile dressings and wraps were applied.  The patient was transported to the recovery room in stable condition.

## 2024-09-13 NOTE — TRANSFER OF CARE
"Anesthesia Transfer of Care Note    Patient: Janay Reveles    Procedure(s) Performed: Procedure(s) (LRB):  Right Leg Microphlebectomies 10-20 (Right)    Patient location: Other: [facility]    Anesthesia Type: MAC    Transport from OR: Transported from OR on room air with adequate spontaneous ventilation. Continuous ECG monitoring in transport. Continuous SpO2 monitoring in transport    Post pain: adequate analgesia    Post assessment: no apparent anesthetic complications    Post vital signs: stable    Level of consciousness: responds to stimulation    Nausea/Vomiting: no nausea/vomiting    Complications: none    Transfer of care protocol was followedComments: Good SV continue, NAD, VSS, RTRN      Last vitals: Visit Vitals  BP (!) 166/79   Pulse 87   Temp 36.7 °C (98.1 °F) (Oral)   Resp 18   Ht 5' 5" (1.651 m)   Wt 113.9 kg (251 lb)   LMP  (LMP Unknown)   SpO2 99%   BMI 41.77 kg/m²     "

## 2024-09-13 NOTE — H&P
Patient ID: Janay Reveles is a 73 y.o. female.     I. HISTORY      Chief Complaint:        Chief Complaint   Patient presents with    Follow-up       3M DARIO DOMINGUEZ         HPI: Janay Reveles is a 73 y.o. female who presents today for a 3 month follow-up after undergoing a right great saphenous vein non thermal adhesive ablation.  She states that she is done well since the procedure and has not had any further bleed from her previously ruptured varicose vein.  She also states the leg feels lighter and less tight.  She does admit that she has some symptomatic itching/burning varicosities of her right lower extremity.  She feels as though these symptoms have become life altering would like to have the symptomatic varicose veins removed if possible.     Left lower extremity continues to be edematous and uncomfortable.  She also has known chronic venous insufficiency of the left lower extremity which she is currently undergoing conservative therapy which was started on 03/05/2024.     Clinical summary  Patient initially seen on 03/05/2024 by Milady GOMEZ for evaluation of bilateral lower extremity swelling, hyperpigmentation and pain.  Symptoms are progressive/worsening and began about 3-4 years ago.  Location is bilateral lower extremities below the knees. Symptoms are worse at the end of the day.  History of venous interventions includes none.  Positive, Grandmother family history of venous disease.  Patient states that she had a rupture of a vein in her left medial ankle one-week ago.  States she held pressure until the bleeding stopped.  No further episodes.  Denies history of CHF, cellulitis or DVT.     Bilateral complete venous reflux study, 03/05/2024, shows no evidence of DVT bilaterally.  Deep venous reflux noted on the left.  The study also shows dilation and axial reflux of the bilateral great saphenous veins.  Area of pre rupture noted in the right medial Gator region/ankle.  Pathological  calf  also noted on the left.     Venous Disease Medical Necessity Documentation Initial Visit Date:3/5/24 Return Check Date:    Have you ever had a rupture or bleed from a varicose vein in your leg(s)?              [] Yes  [x] No   [] Yes   [] No   Have you ever been diagnosed with phlebitis, cellulitis, or inflammation in the area of the varicose veins of  your leg(s)?  [] Yes  [x] No    [] Yes   [] No   Do you have darkened or inflamed skin on your legs?   [x] Yes   [] No   [] Yes   [] No   Do you have leg swelling?      [x] Yes   [] No   [] Yes   [] No   Do you have leg pain?   [x] Yes   [] No   [] Yes   [] No   If yes, describe the type of pain?    [x]   Stabbing [x]  Radiating [x]  Aching   [x]  Tightness []  Throbbing               [x]  Burning [x]  Cramping               Do you have leg discomfort?   [x] Yes   [] No   [] Yes   [] No   If yes, describe the type of discomfort?    [x]  Heaviness [x]  Fullness   [x]  Restlessness [x] Tired/Fatigued [x] Itching               Have you ever worn compression hose?   [] Yes   [x] No   [] Yes   [] No   If yes, how long?             Do you elevate your legs while sitting?   [x] Yes   [] No   [] Yes   [] No   Does venous disease (varicose veins, ulcers, skin changes, leg pain/swelling) interfere with your daily life?  If yes, check activities you are limited or unable to do.     []  Shower  [x]   Walk  []  Exercise  [] Play with children/grandchildren  []  Shop [] Work [x] Stand for any period of time [x] Sleep                               [x] Sitting for an extended period of time.              [x] Yes   [] No   [] Yes   [] No   Do you exercise/have you tried to exercise (i.e.  Walk our participate in a regular exercise routine)?  [] Yes  [x] No   [] Yes   [] No   BMI 40.5                     Past Medical History:   Diagnosis Date    Anemia      Arthritis      Asthma      Breast cancer screening by mammogram 09/07/2023     [facility]    Encounter for long-term  (current) use of other medications      Encounter for screening colonoscopy 05/13/2015    Essential hypertension      Goiter      Gout      H/O bone density study 02/27/2020    History of esophagogastroduodenoscopy (EGD) 01/2020    Hypokalemia      Hypothyroidism      Insomnia      Pre-diabetes      Screening for malignant neoplasm of cervix 01/20/2020    Screening mammogram, encounter for 07/16/2021     [facility]/negative exam/scattered fibroglandular densities/recommended anual screening    Screening mammogram, encounter for 06/11/2020               Past Surgical History:   Procedure Laterality Date    ABLATION, CHEMICAL SEALANT, VARICOSE VEIN Right 4/12/2024     Procedure: ABLATION,CHEMICAL SEALANT,VARICOSE VEI;  Surgeon: Darron Alaniz DO;  Location: [facility];  Service: Peripheral Vascular;  Laterality: Right;    HYSTERECTOMY        TUBAL LIGATION             Tobacco Use History   Social History           Tobacco Use   Smoking Status Never    Passive exposure: Never   Smokeless Tobacco Never              Current Medications      Current Outpatient Medications:     albuterol (PROVENTIL/VENTOLIN HFA) 90 mcg/actuation inhaler, Inhale 2 puffs into the lungs every 4 (four) hours as needed for Wheezing. Rescue, Disp: 25.5 g, Rfl: 3    allopurinoL (ZYLOPRIM) 100 MG tablet, TAKE 2 TABLETS BY MOUTH ONCE DAILY, Disp: 180 tablet, Rfl: 3    amLODIPine (NORVASC) 5 MG tablet, Take 1 tablet by mouth once daily, Disp: 90 tablet, Rfl: 3    fluconazole (DIFLUCAN) 200 MG Tab, Take 1 tablet every 3 days as needed for yeast infection symptoms., Disp: 10 tablet, Rfl: 5    ibuprofen (ADVIL,MOTRIN) 200 MG tablet, Take 200 mg by mouth every 6 (six) hours as needed for Pain., Disp: , Rfl:     levothyroxine (SYNTHROID) 75 MCG tablet, TAKE 1 TABLET BY MOUTH EVERY DAY, Disp: 90 tablet, Rfl: 3    losartan (COZAAR) 100 MG tablet, Take 1 tablet by mouth once daily, Disp: 90 tablet, Rfl: 3    montelukast (SINGULAIR) 10 mg tablet, TAKE 1  TABLET BY MOUTH EVERY DAY, Disp: 90 tablet, Rfl: 3    naproxen (NAPROSYN) 500 MG tablet, Take 1 tablet (500 mg total) by mouth 2 (two) times daily as needed (pain)., Disp: 180 tablet, Rfl: 3    potassium chloride (KLOR-CON) 10 MEQ TbSR, Take 1 tablet (10 mEq total) by mouth 2 (two) times daily., Disp: 180 tablet, Rfl: 3    senna-docusate 8.6-50 mg (PERICOLACE) 8.6-50 mg per tablet, Take 1 tablet by mouth 2 (two) times daily., Disp: 60 tablet, Rfl: 2    spironolactone (ALDACTONE) 25 MG tablet, Take 1 tablet (25 mg total) by mouth once daily., Disp: 90 tablet, Rfl: 3    zolpidem (AMBIEN) 10 mg Tab, Take 1 tablet (10 mg total) by mouth nightly as needed (insomnia)., Disp: 90 tablet, Rfl: 1    aspirin 325 MG tablet, Take 1 tablet (325 mg total) by mouth once daily., Disp: 30 tablet, Rfl: 0  No current facility-administered medications for this visit.     Facility-Administered Medications Ordered in Other Visits:     LIDOcaine HCL 10 mg/ml (1%) injection 1 mL, 1 mL, Other, Once, Darron Alaniz, DO        Review of Systems   Constitutional:  Negative for activity change, chills, diaphoresis, fatigue and fever.   Respiratory:  Negative for cough and shortness of breath.    Cardiovascular:  Positive for leg swelling. Negative for chest pain and claudication.        Hyperpigmentation LE   Gastrointestinal:  Negative for nausea and vomiting.   Musculoskeletal:  Positive for leg pain. Negative for joint swelling.   Integumentary:  Negative for rash and wound.   Neurological:  Negative for weakness and numbness.            II. PHYSICAL EXAM      Physical Exam  Constitutional:       General: She is awake. She is not in acute distress.     Appearance: Normal appearance. She is obese. She is not ill-appearing or toxic-appearing.   HENT:      Head: Normocephalic and atraumatic.   Eyes:      Extraocular Movements: Extraocular movements intact.      Conjunctiva/sclera: Conjunctivae normal.      Pupils: Pupils are equal, round, and  reactive to light.   Neck:      Vascular: No carotid bruit or JVD.   Cardiovascular:      Rate and Rhythm: Normal rate and regular rhythm.      Pulses:           Dorsalis pedis pulses are detected w/ Doppler on the right side and detected w/ Doppler on the left side.        Posterior tibial pulses are detected w/ Doppler on the right side and detected w/ Doppler on the left side.      Heart sounds: No murmur heard.  Pulmonary:      Effort: Pulmonary effort is normal. No respiratory distress.      Breath sounds: No stridor. No wheezing, rhonchi or rales.   Musculoskeletal:         General: No swelling, tenderness or deformity.      Right lower le+ Edema present.      Left lower le+ Edema present.      Comments: Edema with hyperpigmentation of the bilateral lower extremities without evidence of acute cellulitis or open ulceration.   Feet:      Comments: Triphasic hand-held dopplerable pulses of the bilateral dorsalis pedis and posterior tibial arteries.  Skin:     General: Skin is warm.      Capillary Refill: Capillary refill takes less than 2 seconds.      Coloration: Skin is not ashen.      Findings: No bruising, erythema, lesion, rash or wound.   Neurological:      Mental Status: She is alert and oriented to person, place, and time.      Motor: No weakness.   Psychiatric:         Speech: Speech normal.         Behavior: Behavior normal. Behavior is cooperative.            Reticular/Spider veins noted:  RLE: medial calf, ankle, and foot  LLE: medial calf, ankle, and foot     Varicose veins noted:  RLE: medial calf and ankle  LLE:  medial calf and ankle     CEAP Classification  Clinical Signs: Class 4 - Skin changes ascribed to venous disease  Etiologic Classification: Primary  Anatomic distribution: Superficial  Pathophysiologic dysfunction: Reflux                            Venous Clinical Severity Score  Pain:2=Daily, moderate activity limitation, occasional analgesics  Varicose Veins: 1=Few, scattered.  Branch varicose veins  Venous Edema: 2=Afternoon edema, above ankle  Pigmentation: 2=Diffuse over most of gaiter distribution (lower 1/3) or recent pigmentation (purple)  Inflammation: 0=None  Induration: 0=None  Number of Active Ulcers: 0=0  Active Ulceration, Duration: 0=None  Active Ulcer Size: 0=None  Compressive Therapy: 3=Full compliance, stockings + elevation  Total Score: 10        III. ASSESSMENT & PLAN (MEDICAL DECISION MAKING)      1. Venous insufficiency    2. Varicose veins of bilateral lower extremities with pain    3. Pain in both lower extremities             Assessment/Diagnosis and Plan:  The patient is continue with knee-high 20-30 millimeter of mercury compression stockings for daily wear.     The patient continues to have sequela of chronic venous insufficiency despite over 3 months of conservative therapy. The patient continues to have life altering symptoms as well as a positive reflux study as noted in the history of present illness above. At this time I believe it would be reasonable to proceed with right 10-20 micro phlebectomies for symptomatic venous insufficiency.  Untreated venous disease increases the patient's risk for cellulitis, deep vein thrombosis, chronic skin changes and venous ulceration.  Risk and benefits of the procedure were explained to the patient and the patient wishes to proceed. The patient does not have overly distended veins and denies history of DVT/PE and will not require prophylactic anticoagulation.        Darron Alaniz,

## 2024-09-19 ENCOUNTER — OFFICE VISIT (OUTPATIENT)
Dept: VASCULAR SURGERY | Facility: CLINIC | Age: 74
End: 2024-09-19
Payer: MEDICARE

## 2024-09-19 VITALS
BODY MASS INDEX: 41.18 KG/M2 | HEIGHT: 65 IN | HEART RATE: 84 BPM | RESPIRATION RATE: 18 BRPM | DIASTOLIC BLOOD PRESSURE: 93 MMHG | SYSTOLIC BLOOD PRESSURE: 165 MMHG | WEIGHT: 247.19 LBS

## 2024-09-19 DIAGNOSIS — M79.605 PAIN IN BOTH LOWER EXTREMITIES: ICD-10-CM

## 2024-09-19 DIAGNOSIS — I87.2 VENOUS INSUFFICIENCY: Primary | ICD-10-CM

## 2024-09-19 DIAGNOSIS — M79.604 PAIN IN BOTH LOWER EXTREMITIES: ICD-10-CM

## 2024-09-19 DIAGNOSIS — I83.813 VARICOSE VEINS OF BILATERAL LOWER EXTREMITIES WITH PAIN: ICD-10-CM

## 2024-09-19 PROCEDURE — 99999 PR PBB SHADOW E&M-EST. PATIENT-LVL V: CPT | Mod: PBBFAC,,, | Performed by: FAMILY MEDICINE

## 2024-09-19 PROCEDURE — 99215 OFFICE O/P EST HI 40 MIN: CPT | Mod: PBBFAC | Performed by: FAMILY MEDICINE

## 2024-09-19 NOTE — PROGRESS NOTES
VEIN CENTER CLINIC NOTE    Patient ID: Janay Reveles is a 73 y.o. female.    I. HISTORY     Chief Complaint:   Chief Complaint   Patient presents with    Follow-up     Procedure room 2.  3D s/p RT Leg Micros        HPI: Janay Reveles is a 73 y.o. female who presents today for a 6 day follow-up after undergoing micro phlebectomies of the right leg.  The patient states she did well since her procedure and denies any complications.  She admits to a little bit of soreness and bruising over the surgical site, but otherwise she feels pretty good.  She states less throbbing and pain from this area she continues with postoperative compression.  Overall, she feels she is doing well.    Clinical summary  Patient initially seen on 03/05/2024 by Milady GOMEZ for evaluation of bilateral lower extremity swelling, hyperpigmentation and pain.  Symptoms are progressive/worsening and began about 3-4 years ago.  Location is bilateral lower extremities below the knees. Symptoms are worse at the end of the day.  History of venous interventions includes none.  Positive, Grandmother family history of venous disease.  Patient states that she had a rupture of a vein in her left medial ankle one-week ago.  States she held pressure until the bleeding stopped.  No further episodes.  Denies history of CHF, cellulitis or DVT.    Bilateral complete venous reflux study, 03/05/2024, shows no evidence of DVT bilaterally.  Deep venous reflux noted on the left.  The study also shows dilation and axial reflux of the bilateral great saphenous veins.  Area of pre rupture noted in the right medial Gator region/ankle.  Pathological calf  also noted on the left.    Venous Disease Medical Necessity Documentation Initial Visit Date:3/5/24 Return Check Date:    Have you ever had a rupture or bleed from a varicose vein in your leg(s)?              [] Yes  [x] No   [] Yes   [] No   Have you ever been diagnosed with phlebitis, cellulitis, or  inflammation in the area of the varicose veins of  your leg(s)?  [] Yes  [x] No    [] Yes   [] No   Do you have darkened or inflamed skin on your legs?   [x] Yes   [] No   [] Yes   [] No   Do you have leg swelling?     [x] Yes   [] No   [] Yes   [] No   Do you have leg pain?   [x] Yes   [] No   [] Yes   [] No   If yes, describe the type of pain?    [x]   Stabbing [x]  Radiating [x]  Aching   [x]  Tightness []  Throbbing               [x]  Burning [x]  Cramping              Do you have leg discomfort?   [x] Yes   [] No   [] Yes   [] No   If yes, describe the type of discomfort?    [x]  Heaviness [x]  Fullness   [x]  Restlessness [x] Tired/Fatigued [x] Itching              Have you ever worn compression hose?   [] Yes   [x] No   [] Yes   [] No   If yes, how long?           Do you elevate your legs while sitting?   [x] Yes   [] No   [] Yes   [] No   Does venous disease (varicose veins, ulcers, skin changes, leg pain/swelling) interfere with your daily life?  If yes, check activities you are limited or unable to do.    []  Shower  [x]   Walk  []  Exercise  [] Play with children/grandchildren  []  Shop [] Work [x] Stand for any period of time [x] Sleep                               [x] Sitting for an extended period of time.           [x] Yes   [] No   [] Yes   [] No   Do you exercise/have you tried to exercise (i.e.  Walk our participate in a regular exercise routine)?  [] Yes  [x] No   [] Yes   [] No   BMI 40.5             Past Medical History:   Diagnosis Date    Anemia     Arthritis     Asthma     Breast cancer screening by mammogram 09/07/2023    [facility]    Encounter for long-term (current) use of other medications     Encounter for screening colonoscopy 05/13/2015    Essential hypertension     Goiter     Gout     H/O bone density study 02/27/2020    History of esophagogastroduodenoscopy (EGD) 01/2020    Hypokalemia     Hypothyroidism     Insomnia     Pre-diabetes     Screening for malignant neoplasm of cervix  01/20/2020    Screening mammogram, encounter for 07/16/2021    [facility]/negative exam/scattered fibroglandular densities/recommended anual screening    Screening mammogram, encounter for 06/11/2020        Past Surgical History:   Procedure Laterality Date    ABLATION, CHEMICAL SEALANT, VARICOSE VEIN Right 4/12/2024    Procedure: ABLATION,CHEMICAL SEALANT,VARICOSE VEI;  Surgeon: Darron Alaniz DO;  Location: [facility] OR;  Service: Peripheral Vascular;  Laterality: Right;    HYSTERECTOMY      STAB PHLEBECTOMY OF VARICOSE VEINS Right 9/13/2024    Procedure: Right Leg Microphlebectomies 10-20;  Surgeon: Darron Alaniz DO;  Location: [facility];  Service: Peripheral Vascular;  Laterality: Right;    TUBAL LIGATION         Social History     Tobacco Use   Smoking Status Never    Passive exposure: Never   Smokeless Tobacco Never         Current Outpatient Medications:     albuterol (PROVENTIL/VENTOLIN HFA) 90 mcg/actuation inhaler, Inhale 2 puffs into the lungs every 4 (four) hours as needed for Wheezing. Rescue, Disp: 25.5 g, Rfl: 3    allopurinoL (ZYLOPRIM) 100 MG tablet, TAKE 2 TABLETS BY MOUTH ONCE DAILY, Disp: 180 tablet, Rfl: 3    amLODIPine (NORVASC) 5 MG tablet, Take 1 tablet by mouth once daily, Disp: 90 tablet, Rfl: 3    HYDROcodone-acetaminophen (NORCO) 7.5-325 mg per tablet, Take 1 tablet by mouth every 6 (six) hours as needed for Pain., Disp: 15 tablet, Rfl: 0    ibuprofen (ADVIL,MOTRIN) 200 MG tablet, Take 200 mg by mouth every 6 (six) hours as needed for Pain., Disp: , Rfl:     levothyroxine (SYNTHROID) 75 MCG tablet, TAKE 1 TABLET BY MOUTH EVERY DAY, Disp: 90 tablet, Rfl: 3    losartan (COZAAR) 100 MG tablet, Take 1 tablet by mouth once daily, Disp: 90 tablet, Rfl: 0    montelukast (SINGULAIR) 10 mg tablet, TAKE 1 TABLET BY MOUTH EVERY DAY, Disp: 90 tablet, Rfl: 3    naproxen (NAPROSYN) 500 MG tablet, Take 1 tablet (500 mg total) by mouth 2 (two) times daily as needed (pain)., Disp: 180  tablet, Rfl: 3    ondansetron (ZOFRAN) 4 MG tablet, Take 1 tablet (4 mg total) by mouth every 8 (eight) hours as needed for Nausea., Disp: 15 tablet, Rfl: 0    potassium chloride (KLOR-CON) 10 MEQ TbSR, Take 1 tablet (10 mEq total) by mouth 2 (two) times daily., Disp: 180 tablet, Rfl: 3    senna-docusate 8.6-50 mg (PERICOLACE) 8.6-50 mg per tablet, Take 1 tablet by mouth 2 (two) times daily., Disp: 60 tablet, Rfl: 2    spironolactone (ALDACTONE) 25 MG tablet, Take 1 tablet (25 mg total) by mouth once daily., Disp: 90 tablet, Rfl: 3    zolpidem (AMBIEN) 10 mg Tab, Take 1 tablet (10 mg total) by mouth nightly as needed (insomnia)., Disp: 90 tablet, Rfl: 1    aspirin 325 MG tablet, Take 1 tablet (325 mg total) by mouth once daily., Disp: 30 tablet, Rfl: 0    fluconazole (DIFLUCAN) 200 MG Tab, Take 1 tablet every 3 days as needed for yeast infection symptoms. (Patient not taking: Reported on 9/19/2024), Disp: 10 tablet, Rfl: 5  No current facility-administered medications for this visit.    Facility-Administered Medications Ordered in Other Visits:     LIDOcaine HCL 10 mg/ml (1%) injection 1 mL, 1 mL, Other, Once, Darron Alaniz, DO    Review of Systems   Constitutional:  Negative for activity change, chills, diaphoresis, fatigue and fever.   Respiratory:  Negative for cough and shortness of breath.    Cardiovascular:  Positive for leg swelling. Negative for chest pain and claudication.        Hyperpigmentation LE   Gastrointestinal:  Negative for nausea and vomiting.   Musculoskeletal:  Positive for leg pain. Negative for joint swelling.   Integumentary:  Negative for rash and wound.   Neurological:  Negative for weakness and numbness.          II. PHYSICAL EXAM     Physical Exam  Constitutional:       General: She is awake. She is not in acute distress.     Appearance: Normal appearance. She is obese. She is not ill-appearing or toxic-appearing.   HENT:      Head: Normocephalic and atraumatic.   Eyes:       Extraocular Movements: Extraocular movements intact.      Conjunctiva/sclera: Conjunctivae normal.      Pupils: Pupils are equal, round, and reactive to light.   Neck:      Vascular: No carotid bruit or JVD.   Cardiovascular:      Rate and Rhythm: Normal rate and regular rhythm.      Pulses:           Dorsalis pedis pulses are detected w/ Doppler on the right side and detected w/ Doppler on the left side.        Posterior tibial pulses are detected w/ Doppler on the right side and detected w/ Doppler on the left side.      Heart sounds: No murmur heard.  Pulmonary:      Effort: Pulmonary effort is normal. No respiratory distress.      Breath sounds: No stridor. No wheezing, rhonchi or rales.   Musculoskeletal:         General: No swelling, tenderness or deformity.      Right lower le+ Edema present.      Left lower le+ Edema present.      Comments: Edema with hyperpigmentation of the bilateral lower extremities without evidence of acute cellulitis or open ulceration.   Feet:      Comments: Triphasic hand-held dopplerable pulses of the bilateral dorsalis pedis and posterior tibial arteries.  Skin:     General: Skin is warm.      Capillary Refill: Capillary refill takes less than 2 seconds.      Coloration: Skin is not ashen.      Findings: No bruising, erythema, lesion, rash or wound.   Neurological:      Mental Status: She is alert and oriented to person, place, and time.      Motor: No weakness.   Psychiatric:         Speech: Speech normal.         Behavior: Behavior normal. Behavior is cooperative.         Reticular/Spider veins noted:  RLE: medial calf, ankle, and foot  LLE: medial calf, ankle, and foot    Varicose veins noted:  RLE: medial calf and ankle  LLE:  medial calf and ankle    CEAP Classification  Clinical Signs: Class 4 - Skin changes ascribed to venous disease  Etiologic Classification: Primary  Anatomic distribution: Superficial  Pathophysiologic dysfunction: Reflux       Venous Clinical  Severity Score  Pain:2=Daily, moderate activity limitation, occasional analgesics  Varicose Veins: 1=Few, scattered. Branch varicose veins  Venous Edema: 2=Afternoon edema, above ankle  Pigmentation: 2=Diffuse over most of gaiter distribution (lower 1/3) or recent pigmentation (purple)  Inflammation: 0=None  Induration: 0=None  Number of Active Ulcers: 0=0  Active Ulceration, Duration: 0=None  Active Ulcer Size: 0=None  Compressive Therapy: 3=Full compliance, stockings + elevation  Total Score: 10       III. ASSESSMENT & PLAN (MEDICAL DECISION MAKING)     1. Venous insufficiency    2. Varicose veins of bilateral lower extremities with pain    3. Pain in both lower extremities          Assessment/Diagnosis and Plan:  The patient is to continue wearing thigh-high postop compression on the right for the next 2 weeks.  Longer if necessary.  Return to clinic with any signs of phlebitis or other difficulties.  The patient is continue with knee-high 20-30 millimeter of mercury compression stockings for daily wear.  Follow-up one-month.  No ultrasound unless she is having trouble.         Darron Alaniz, DO

## 2024-10-04 ENCOUNTER — OFFICE VISIT (OUTPATIENT)
Dept: FAMILY MEDICINE | Facility: CLINIC | Age: 74
End: 2024-10-04
Payer: MEDICARE

## 2024-10-04 VITALS
TEMPERATURE: 97 F | SYSTOLIC BLOOD PRESSURE: 132 MMHG | BODY MASS INDEX: 41.48 KG/M2 | WEIGHT: 249 LBS | HEIGHT: 65 IN | OXYGEN SATURATION: 99 % | HEART RATE: 70 BPM | DIASTOLIC BLOOD PRESSURE: 84 MMHG

## 2024-10-04 DIAGNOSIS — J30.9 ALLERGIC RHINITIS, UNSPECIFIED SEASONALITY, UNSPECIFIED TRIGGER: ICD-10-CM

## 2024-10-04 DIAGNOSIS — I10 ESSENTIAL HYPERTENSION: Primary | ICD-10-CM

## 2024-10-04 DIAGNOSIS — Z78.0 POST-MENOPAUSAL: ICD-10-CM

## 2024-10-04 DIAGNOSIS — R73.03 PRE-DIABETES: ICD-10-CM

## 2024-10-04 DIAGNOSIS — Z12.11 SCREENING FOR MALIGNANT NEOPLASM OF COLON: ICD-10-CM

## 2024-10-04 DIAGNOSIS — N18.31 CHRONIC KIDNEY DISEASE, STAGE 3A: ICD-10-CM

## 2024-10-04 DIAGNOSIS — E03.9 HYPOTHYROIDISM, UNSPECIFIED TYPE: ICD-10-CM

## 2024-10-04 DIAGNOSIS — G47.09 OTHER INSOMNIA: Chronic | ICD-10-CM

## 2024-10-04 LAB
ALBUMIN SERPL BCP-MCNC: 3.5 G/DL (ref 3.5–5)
ALBUMIN/GLOB SERPL: 0.8 {RATIO}
ALP SERPL-CCNC: 100 U/L (ref 55–142)
ALT SERPL W P-5'-P-CCNC: 11 U/L (ref 13–56)
ANION GAP SERPL CALCULATED.3IONS-SCNC: 12 MMOL/L (ref 7–16)
AST SERPL W P-5'-P-CCNC: 10 U/L (ref 15–37)
BASOPHILS # BLD AUTO: 0.05 K/UL (ref 0–0.2)
BASOPHILS NFR BLD AUTO: 0.5 % (ref 0–1)
BILIRUB SERPL-MCNC: 0.2 MG/DL (ref ?–1.2)
BUN SERPL-MCNC: 21 MG/DL (ref 7–18)
BUN/CREAT SERPL: 17 (ref 6–20)
CALCIUM SERPL-MCNC: 9.9 MG/DL (ref 8.5–10.1)
CHLORIDE SERPL-SCNC: 108 MMOL/L (ref 98–107)
CHOLEST SERPL-MCNC: 140 MG/DL (ref 0–200)
CHOLEST/HDLC SERPL: 2.6 {RATIO}
CO2 SERPL-SCNC: 24 MMOL/L (ref 21–32)
CREAT SERPL-MCNC: 1.22 MG/DL (ref 0.55–1.02)
CREAT UR-MCNC: 72 MG/DL (ref 28–219)
DIFFERENTIAL METHOD BLD: ABNORMAL
EGFR (NO RACE VARIABLE) (RUSH/TITUS): 47 ML/MIN/1.73M2
EOSINOPHIL # BLD AUTO: 0.19 K/UL (ref 0–0.5)
EOSINOPHIL NFR BLD AUTO: 2.1 % (ref 1–4)
ERYTHROCYTE [DISTWIDTH] IN BLOOD BY AUTOMATED COUNT: 16.8 % (ref 11.5–14.5)
EST. AVERAGE GLUCOSE BLD GHB EST-MCNC: 131 MG/DL
GLOBULIN SER-MCNC: 4.4 G/DL (ref 2–4)
GLUCOSE SERPL-MCNC: 107 MG/DL (ref 74–106)
HBA1C MFR BLD HPLC: 6.2 % (ref 4.5–6.6)
HCT VFR BLD AUTO: 37.4 % (ref 38–47)
HDLC SERPL-MCNC: 54 MG/DL (ref 40–60)
HGB BLD-MCNC: 11.4 G/DL (ref 12–16)
IMM GRANULOCYTES # BLD AUTO: 0.05 K/UL (ref 0–0.04)
IMM GRANULOCYTES NFR BLD: 0.5 % (ref 0–0.4)
LDLC SERPL CALC-MCNC: 74 MG/DL
LDLC/HDLC SERPL: 1.4 {RATIO}
LYMPHOCYTES # BLD AUTO: 1.91 K/UL (ref 1–4.8)
LYMPHOCYTES NFR BLD AUTO: 20.8 % (ref 27–41)
MCH RBC QN AUTO: 26.8 PG (ref 27–31)
MCHC RBC AUTO-ENTMCNC: 30.5 G/DL (ref 32–36)
MCV RBC AUTO: 88 FL (ref 80–96)
MICROALBUMIN UR-MCNC: 0.9 MG/DL (ref 0–2.8)
MICROALBUMIN/CREAT RATIO PNL UR: 12.5 MG/G (ref 0–30)
MONOCYTES # BLD AUTO: 0.56 K/UL (ref 0–0.8)
MONOCYTES NFR BLD AUTO: 6.1 % (ref 2–6)
MPC BLD CALC-MCNC: 9.1 FL (ref 9.4–12.4)
NEUTROPHILS # BLD AUTO: 6.41 K/UL (ref 1.8–7.7)
NEUTROPHILS NFR BLD AUTO: 70 % (ref 53–65)
NONHDLC SERPL-MCNC: 86 MG/DL
NRBC # BLD AUTO: 0 X10E3/UL
NRBC, AUTO (.00): 0 %
PLATELET # BLD AUTO: 697 K/UL (ref 150–400)
POTASSIUM SERPL-SCNC: 5.5 MMOL/L (ref 3.5–5.1)
PROT SERPL-MCNC: 7.9 G/DL (ref 6.4–8.2)
RBC # BLD AUTO: 4.25 M/UL (ref 4.2–5.4)
SODIUM SERPL-SCNC: 138 MMOL/L (ref 136–145)
TRIGL SERPL-MCNC: 58 MG/DL (ref 35–150)
TSH SERPL DL<=0.005 MIU/L-ACNC: 1.62 UIU/ML (ref 0.36–3.74)
VLDLC SERPL-MCNC: 12 MG/DL
WBC # BLD AUTO: 9.17 K/UL (ref 4.5–11)

## 2024-10-04 PROCEDURE — 80061 LIPID PANEL: CPT | Mod: ,,, | Performed by: CLINICAL MEDICAL LABORATORY

## 2024-10-04 PROCEDURE — 82043 UR ALBUMIN QUANTITATIVE: CPT | Mod: ,,, | Performed by: CLINICAL MEDICAL LABORATORY

## 2024-10-04 PROCEDURE — 80053 COMPREHEN METABOLIC PANEL: CPT | Mod: ,,, | Performed by: CLINICAL MEDICAL LABORATORY

## 2024-10-04 PROCEDURE — 84443 ASSAY THYROID STIM HORMONE: CPT | Mod: ,,, | Performed by: CLINICAL MEDICAL LABORATORY

## 2024-10-04 PROCEDURE — 85025 COMPLETE CBC W/AUTO DIFF WBC: CPT | Mod: ,,, | Performed by: CLINICAL MEDICAL LABORATORY

## 2024-10-04 PROCEDURE — 82570 ASSAY OF URINE CREATININE: CPT | Mod: ,,, | Performed by: CLINICAL MEDICAL LABORATORY

## 2024-10-04 PROCEDURE — 83036 HEMOGLOBIN GLYCOSYLATED A1C: CPT | Mod: ,,, | Performed by: CLINICAL MEDICAL LABORATORY

## 2024-10-04 RX ORDER — MONTELUKAST SODIUM 10 MG/1
10 TABLET ORAL NIGHTLY
Qty: 90 TABLET | Refills: 3 | Status: SHIPPED | OUTPATIENT
Start: 2024-10-04

## 2024-10-04 RX ORDER — SPIRONOLACTONE 25 MG/1
25 TABLET ORAL DAILY
Qty: 90 TABLET | Refills: 3 | Status: SHIPPED | OUTPATIENT
Start: 2024-10-04 | End: 2025-10-04

## 2024-10-04 NOTE — PROGRESS NOTES
Subjective     Patient ID: Janay Reveles is a 73 y.o. female.    Chief Complaint: Establish Care (6th month fu)    Pt presents to establish care.      Review of Systems   Constitutional:  Negative for activity change, appetite change, chills, fatigue and fever.   HENT:  Negative for nasal congestion, ear discharge, nosebleeds, postnasal drip, rhinorrhea, sinus pressure/congestion, sneezing, sore throat and tinnitus.    Eyes:  Negative for pain, discharge, redness and itching.   Respiratory:  Negative for cough, choking, chest tightness, shortness of breath and wheezing.    Cardiovascular:  Negative for chest pain.   Gastrointestinal:  Negative for abdominal distention, abdominal pain, blood in stool, change in bowel habit, constipation, diarrhea, nausea and vomiting.   Genitourinary:  Negative for decreased urine volume, dysuria, flank pain and frequency.   Musculoskeletal:  Negative for back pain and gait problem.   Integumentary:  Negative for wound, breast mass and breast discharge.   Allergic/Immunologic: Negative for immunocompromised state.   Neurological:  Negative for dizziness, light-headedness and headaches.   Psychiatric/Behavioral:  Negative for agitation, behavioral problems and hallucinations.    Breast: Negative for mass         Objective     Physical Exam  Vitals and nursing note reviewed.   Constitutional:       Appearance: Normal appearance.   Cardiovascular:      Rate and Rhythm: Normal rate and regular rhythm.      Heart sounds: Normal heart sounds.   Pulmonary:      Effort: Pulmonary effort is normal.      Breath sounds: Normal breath sounds.   Musculoskeletal:         General: Normal range of motion.   Neurological:      Mental Status: She is alert and oriented to person, place, and time.   Psychiatric:         Mood and Affect: Mood normal.         Behavior: Behavior normal.            Assessment and Plan     1. Essential hypertension  -     spironolactone (ALDACTONE) 25 MG tablet; Take 1  tablet (25 mg total) by mouth once daily.  Dispense: 90 tablet; Refill: 3  -     CBC Auto Differential; Future; Expected date: 10/04/2024  -     Comprehensive Metabolic Panel; Future; Expected date: 10/04/2024  -     Lipid Panel; Future; Expected date: 10/04/2024  -     TSH; Future; Expected date: 10/04/2024    2. Allergic rhinitis, unspecified seasonality, unspecified trigger  -     montelukast (SINGULAIR) 10 mg tablet; Take 1 tablet (10 mg total) by mouth every evening.  Dispense: 90 tablet; Refill: 3    3. Screening for malignant neoplasm of colon  -     Colonoscopy; Future; Expected date: 10/04/2024    4. Post-menopausal  -     DXA Bone Density Axial Skeleton 1 or more sites; Future; Expected date: 10/04/2024    5. Chronic kidney disease, stage 3a  -     Microalbumin/Creatinine Ratio, Urine; Future; Expected date: 10/04/2024    6. Pre-diabetes  -     Hemoglobin A1C; Future; Expected date: 10/04/2024    7. Hypothyroidism, unspecified type    8. Other insomnia        Will call pt with lab results and appts for mammogram and dxa. Entered a referral for a colonoscopy.          Follow up in about 6 months (around 4/4/2025).

## 2024-10-21 ENCOUNTER — HOSPITAL ENCOUNTER (OUTPATIENT)
Dept: RADIOLOGY | Facility: HOSPITAL | Age: 74
Discharge: HOME | End: 2024-10-21
Attending: NURSE PRACTITIONER
Payer: MEDICARE

## 2024-10-21 DIAGNOSIS — Z78.0 POST-MENOPAUSAL: ICD-10-CM

## 2024-10-21 PROCEDURE — 77080 DXA BONE DENSITY AXIAL: CPT | Mod: TC

## 2024-10-21 PROCEDURE — 77080 DXA BONE DENSITY AXIAL: CPT | Mod: 26,,, | Performed by: RADIOLOGY

## 2024-10-22 ENCOUNTER — PATIENT MESSAGE (OUTPATIENT)
Dept: FAMILY MEDICINE | Facility: CLINIC | Age: 74
End: 2024-10-22
Payer: MEDICARE

## 2024-10-29 ENCOUNTER — OFFICE VISIT (OUTPATIENT)
Dept: VASCULAR SURGERY | Facility: CLINIC | Age: 74
End: 2024-10-29
Payer: MEDICARE

## 2024-10-29 VITALS
HEART RATE: 85 BPM | HEIGHT: 65 IN | BODY MASS INDEX: 41.55 KG/M2 | DIASTOLIC BLOOD PRESSURE: 91 MMHG | SYSTOLIC BLOOD PRESSURE: 163 MMHG | WEIGHT: 249.38 LBS | RESPIRATION RATE: 18 BRPM

## 2024-10-29 DIAGNOSIS — I87.2 VENOUS INSUFFICIENCY: Primary | ICD-10-CM

## 2024-10-29 DIAGNOSIS — R60.0 EDEMA, LOWER EXTREMITY: ICD-10-CM

## 2024-10-29 PROCEDURE — 99999 PR PBB SHADOW E&M-EST. PATIENT-LVL IV: CPT | Mod: PBBFAC,,, | Performed by: FAMILY MEDICINE

## 2024-10-29 PROCEDURE — 99214 OFFICE O/P EST MOD 30 MIN: CPT | Mod: PBBFAC | Performed by: FAMILY MEDICINE

## 2024-10-29 PROCEDURE — 99213 OFFICE O/P EST LOW 20 MIN: CPT | Mod: S$PBB,,, | Performed by: FAMILY MEDICINE

## 2024-11-26 DIAGNOSIS — I10 ESSENTIAL HYPERTENSION: Chronic | ICD-10-CM

## 2024-11-26 RX ORDER — AMLODIPINE BESYLATE 5 MG/1
5 TABLET ORAL DAILY
Qty: 90 TABLET | Refills: 3 | Status: SHIPPED | OUTPATIENT
Start: 2024-11-26

## 2024-11-30 ENCOUNTER — EXTERNAL CHRONIC CARE MANAGEMENT (OUTPATIENT)
Dept: FAMILY MEDICINE | Facility: CLINIC | Age: 74
End: 2024-11-30
Payer: MEDICARE

## 2024-11-30 PROCEDURE — G0511 CCM/BHI BY RHC/FQHC 20MIN MO: HCPCS | Mod: ,,, | Performed by: FAMILY MEDICINE

## 2025-01-06 ENCOUNTER — OFFICE VISIT (OUTPATIENT)
Dept: VASCULAR SURGERY | Facility: CLINIC | Age: 75
End: 2025-01-06
Payer: MEDICARE

## 2025-01-06 VITALS
DIASTOLIC BLOOD PRESSURE: 86 MMHG | RESPIRATION RATE: 16 BRPM | HEART RATE: 77 BPM | SYSTOLIC BLOOD PRESSURE: 158 MMHG | WEIGHT: 250 LBS | HEIGHT: 65 IN | BODY MASS INDEX: 41.65 KG/M2

## 2025-01-06 DIAGNOSIS — M79.604 PAIN IN BOTH LOWER EXTREMITIES: ICD-10-CM

## 2025-01-06 DIAGNOSIS — R60.0 EDEMA, LOWER EXTREMITY: ICD-10-CM

## 2025-01-06 DIAGNOSIS — M79.605 PAIN IN BOTH LOWER EXTREMITIES: ICD-10-CM

## 2025-01-06 DIAGNOSIS — I87.2 VENOUS INSUFFICIENCY: Primary | ICD-10-CM

## 2025-01-06 PROCEDURE — 99214 OFFICE O/P EST MOD 30 MIN: CPT | Mod: PBBFAC | Performed by: FAMILY MEDICINE

## 2025-01-06 PROCEDURE — 99214 OFFICE O/P EST MOD 30 MIN: CPT | Mod: S$PBB,,, | Performed by: FAMILY MEDICINE

## 2025-01-06 PROCEDURE — 99999 PR PBB SHADOW E&M-EST. PATIENT-LVL IV: CPT | Mod: PBBFAC,,, | Performed by: FAMILY MEDICINE

## 2025-01-06 NOTE — PROGRESS NOTES
VE[facility] CLINIC NOTE    Patient ID: Janay Reveles is a 74 y.o. female.    I. HISTORY     Chief Complaint:   Chief Complaint   Patient presents with    Follow-up     Exam room 3.  9 month s/p Right GSV VenaSeal Ablation and Right leg micros.        HPI: Janay Reveles is a 74 y.o. female who presents today for a 9-month follow-up after undergoing micro phlebectomies of the right leg.  The patient states she did well since her procedure and denies any complications.  She also has history of a right great saphenous vein radiofrequency ablation back in April of 2024.  States right leg feels good overall with less swelling, pain and heaviness.  Overall she feels she is doing well.  Has history of reflux in the left as well, but states her left leg is not bothering her at this time.    Clinical summary  Patient initially seen on 03/05/2024 by Milady GOMEZ for evaluation of bilateral lower extremity swelling, hyperpigmentation and pain.  Symptoms are progressive/worsening and began about 3-4 years ago.  Location is bilateral lower extremities below the knees. Symptoms are worse at the end of the day.  History of venous interventions includes none.  Positive, Grandmother family history of venous disease.  Patient states that she had a rupture of a vein in her left medial ankle one-week ago.  States she held pressure until the bleeding stopped.  No further episodes.  Denies history of CHF, cellulitis or DVT.    Bilateral complete venous reflux study, 03/05/2024, shows no evidence of DVT bilaterally.  Deep venous reflux noted on the left.  The study also shows dilation and axial reflux of the bilateral great saphenous veins.  Area of pre rupture noted in the right medial Gator region/ankle.  Pathological calf  also noted on the left.    Venous Disease Medical Necessity Documentation Initial Visit Date:3/5/24 Return Check Date:    Have you ever had a rupture or bleed from a varicose vein in your leg(s)?               [] Yes  [x] No   [] Yes   [] No   Have you ever been diagnosed with phlebitis, cellulitis, or inflammation in the area of the varicose veins of  your leg(s)?  [] Yes  [x] No    [] Yes   [] No   Do you have darkened or inflamed skin on your legs?   [x] Yes   [] No   [] Yes   [] No   Do you have leg swelling?     [x] Yes   [] No   [] Yes   [] No   Do you have leg pain?   [x] Yes   [] No   [] Yes   [] No   If yes, describe the type of pain?    [x]   Stabbing [x]  Radiating [x]  Aching   [x]  Tightness []  Throbbing               [x]  Burning [x]  Cramping              Do you have leg discomfort?   [x] Yes   [] No   [] Yes   [] No   If yes, describe the type of discomfort?    [x]  Heaviness [x]  Fullness   [x]  Restlessness [x] Tired/Fatigued [x] Itching              Have you ever worn compression hose?   [] Yes   [x] No   [] Yes   [] No   If yes, how long?           Do you elevate your legs while sitting?   [x] Yes   [] No   [] Yes   [] No   Does venous disease (varicose veins, ulcers, skin changes, leg pain/swelling) interfere with your daily life?  If yes, check activities you are limited or unable to do.    []  Shower  [x]   Walk  []  Exercise  [] Play with children/grandchildren  []  Shop [] Work [x] Stand for any period of time [x] Sleep                               [x] Sitting for an extended period of time.           [x] Yes   [] No   [] Yes   [] No   Do you exercise/have you tried to exercise (i.e.  Walk our participate in a regular exercise routine)?  [] Yes  [x] No   [] Yes   [] No   BMI 40.5             Past Medical History:   Diagnosis Date    Anemia     Arthritis     Asthma     Breast cancer screening by mammogram 09/07/2023    [facility]    Encounter for long-term (current) use of other medications     Encounter for screening colonoscopy 05/13/2015    Essential hypertension     Goiter     Gout     H/O bone density study 02/27/2020    History of esophagogastroduodenoscopy (EGD) 01/2020    Hypokalemia      Hypothyroidism     Insomnia     Pre-diabetes     Screening for malignant neoplasm of cervix 01/20/2020    Screening mammogram, encounter for 07/16/2021    [facility]/negative exam/scattered fibroglandular densities/recommended anual screening    Screening mammogram, encounter for 06/11/2020        Past Surgical History:   Procedure Laterality Date    ABLATION, CHEMICAL SEALANT, VARICOSE VEIN Right 4/12/2024    Procedure: ABLATION,CHEMICAL SEALANT,VARICOSE VEI;  Surgeon: Darron Alaniz DO;  Location: [facility] OR;  Service: Peripheral Vascular;  Laterality: Right;    HYSTERECTOMY      STAB PHLEBECTOMY OF VARICOSE VEINS Right 9/13/2024    Procedure: Right Leg Microphlebectomies 10-20;  Surgeon: Darron Alaniz DO;  Location: [facility];  Service: Peripheral Vascular;  Laterality: Right;    TUBAL LIGATION         Social History     Tobacco Use   Smoking Status Never    Passive exposure: Never   Smokeless Tobacco Never         Current Outpatient Medications:     allopurinoL (ZYLOPRIM) 100 MG tablet, TAKE 2 TABLETS BY MOUTH ONCE DAILY, Disp: 180 tablet, Rfl: 3    amLODIPine (NORVASC) 5 MG tablet, Take 1 tablet (5 mg total) by mouth once daily., Disp: 90 tablet, Rfl: 3    levothyroxine (SYNTHROID) 75 MCG tablet, TAKE 1 TABLET BY MOUTH EVERY DAY, Disp: 90 tablet, Rfl: 3    losartan (COZAAR) 100 MG tablet, Take 1 tablet by mouth once daily, Disp: 90 tablet, Rfl: 0    montelukast (SINGULAIR) 10 mg tablet, Take 1 tablet (10 mg total) by mouth every evening., Disp: 90 tablet, Rfl: 3    naproxen (NAPROSYN) 500 MG tablet, Take 1 tablet (500 mg total) by mouth 2 (two) times daily as needed (pain)., Disp: 180 tablet, Rfl: 3    potassium chloride (KLOR-CON) 10 MEQ TbSR, Take 1 tablet (10 mEq total) by mouth 2 (two) times daily., Disp: 180 tablet, Rfl: 3    senna-docusate 8.6-50 mg (PERICOLACE) 8.6-50 mg per tablet, Take 1 tablet by mouth 2 (two) times daily., Disp: 60 tablet, Rfl: 2    spironolactone (ALDACTONE) 25  MG tablet, Take 1 tablet (25 mg total) by mouth once daily., Disp: 90 tablet, Rfl: 3    zolpidem (AMBIEN) 10 mg Tab, Take 1 tablet (10 mg total) by mouth nightly as needed (insomnia)., Disp: 90 tablet, Rfl: 1    Review of Systems   Constitutional:  Negative for activity change, chills, diaphoresis, fatigue and fever.   Respiratory:  Negative for cough and shortness of breath.    Cardiovascular:  Positive for leg swelling. Negative for chest pain and claudication.        Hyperpigmentation LE   Gastrointestinal:  Negative for nausea and vomiting.   Musculoskeletal:  Positive for leg pain. Negative for joint swelling.   Integumentary:  Negative for rash and wound.   Neurological:  Negative for weakness and numbness.          II. PHYSICAL EXAM     Physical Exam  Constitutional:       General: She is awake. She is not in acute distress.     Appearance: Normal appearance. She is obese. She is not ill-appearing or toxic-appearing.   HENT:      Head: Normocephalic and atraumatic.   Eyes:      Extraocular Movements: Extraocular movements intact.      Conjunctiva/sclera: Conjunctivae normal.      Pupils: Pupils are equal, round, and reactive to light.   Neck:      Vascular: No carotid bruit or JVD.   Cardiovascular:      Rate and Rhythm: Normal rate and regular rhythm.      Pulses:           Dorsalis pedis pulses are detected w/ Doppler on the right side and detected w/ Doppler on the left side.        Posterior tibial pulses are detected w/ Doppler on the right side and detected w/ Doppler on the left side.      Heart sounds: No murmur heard.  Pulmonary:      Effort: Pulmonary effort is normal. No respiratory distress.      Breath sounds: No stridor. No wheezing, rhonchi or rales.   Musculoskeletal:         General: No swelling, tenderness or deformity.      Right lower le+ Edema present.      Left lower le+ Edema present.      Comments: Edema with hyperpigmentation of the bilateral lower extremities without evidence  of acute cellulitis or open ulceration.   Feet:      Comments: Triphasic hand-held dopplerable pulses of the bilateral dorsalis pedis and posterior tibial arteries.  Skin:     General: Skin is warm.      Capillary Refill: Capillary refill takes less than 2 seconds.      Coloration: Skin is not ashen.      Findings: No bruising, erythema, lesion, rash or wound.   Neurological:      Mental Status: She is alert and oriented to person, place, and time.      Motor: No weakness.   Psychiatric:         Speech: Speech normal.         Behavior: Behavior normal. Behavior is cooperative.         Reticular/Spider veins noted:  RLE: medial calf, ankle, and foot  LLE: medial calf, ankle, and foot    Varicose veins noted:  RLE: medial calf and ankle  LLE:  medial calf and ankle    CEAP Classification  Clinical Signs: Class 4 - Skin changes ascribed to venous disease  Etiologic Classification: Primary  Anatomic distribution: Superficial  Pathophysiologic dysfunction: Reflux       Venous Clinical Severity Score  Pain:2=Daily, moderate activity limitation, occasional analgesics  Varicose Veins: 1=Few, scattered. Branch varicose veins  Venous Edema: 2=Afternoon edema, above ankle  Pigmentation: 2=Diffuse over most of gaiter distribution (lower 1/3) or recent pigmentation (purple)  Inflammation: 0=None  Induration: 0=None  Number of Active Ulcers: 0=0  Active Ulceration, Duration: 0=None  Active Ulcer Size: 0=None  Compressive Therapy: 3=Full compliance, stockings + elevation  Total Score: 10       III. ASSESSMENT & PLAN (MEDICAL DECISION MAKING)     1. Venous insufficiency    2. Edema, lower extremity    3. Pain in both lower extremities        Assessment/Diagnosis and Plan:  The patient is doing well post ablation.  The patient is to continue with knee-high 20-30 millimeter of mercury compression stockings for daily wear along with leg exercise and leg elevation.  Follow-up in 1 year.    Darron Alaniz DO

## 2025-01-21 ENCOUNTER — OFFICE VISIT (OUTPATIENT)
Dept: FAMILY MEDICINE | Facility: CLINIC | Age: 75
End: 2025-01-21
Payer: MEDICARE

## 2025-01-21 VITALS
WEIGHT: 257.81 LBS | DIASTOLIC BLOOD PRESSURE: 94 MMHG | HEIGHT: 65 IN | BODY MASS INDEX: 42.95 KG/M2 | TEMPERATURE: 99 F | OXYGEN SATURATION: 97 % | SYSTOLIC BLOOD PRESSURE: 158 MMHG | HEART RATE: 81 BPM | RESPIRATION RATE: 20 BRPM

## 2025-01-21 DIAGNOSIS — E03.9 HYPOTHYROIDISM, UNSPECIFIED TYPE: Chronic | ICD-10-CM

## 2025-01-21 DIAGNOSIS — Z00.00 ENCOUNTER FOR SUBSEQUENT ANNUAL WELLNESS VISIT (AWV) IN MEDICARE PATIENT: Primary | ICD-10-CM

## 2025-01-21 DIAGNOSIS — N18.31 CHRONIC KIDNEY DISEASE, STAGE 3A: ICD-10-CM

## 2025-01-21 DIAGNOSIS — Z23 NEED FOR VACCINATION: ICD-10-CM

## 2025-01-21 DIAGNOSIS — E66.01 CLASS 3 SEVERE OBESITY DUE TO EXCESS CALORIES WITH SERIOUS COMORBIDITY AND BODY MASS INDEX (BMI) OF 40.0 TO 44.9 IN ADULT: ICD-10-CM

## 2025-01-21 DIAGNOSIS — I10 ESSENTIAL HYPERTENSION: ICD-10-CM

## 2025-01-21 DIAGNOSIS — E66.813 CLASS 3 SEVERE OBESITY DUE TO EXCESS CALORIES WITH SERIOUS COMORBIDITY AND BODY MASS INDEX (BMI) OF 40.0 TO 44.9 IN ADULT: ICD-10-CM

## 2025-01-21 DIAGNOSIS — R73.03 PRE-DIABETES: Chronic | ICD-10-CM

## 2025-01-21 PROCEDURE — G0439 PPPS, SUBSEQ VISIT: HCPCS | Mod: ,,, | Performed by: NURSE PRACTITIONER

## 2025-01-21 PROCEDURE — G0008 ADMIN INFLUENZA VIRUS VAC: HCPCS | Mod: ,,, | Performed by: NURSE PRACTITIONER

## 2025-01-21 PROCEDURE — 90653 IIV ADJUVANT VACCINE IM: CPT | Mod: ,,, | Performed by: NURSE PRACTITIONER

## 2025-01-21 NOTE — PROGRESS NOTES
"  Janay Reveles presented for a follow-up Medicare AWV today. The following components were reviewed and updated:    Medical history  Family History  Social history  Allergies and Current Medications  Health Risk Assessment  Health Maintenance  Care Team    **See Completed Assessments for Annual Wellness visit with in the encounter summary    The following assessments were completed:  Depression Screening  Cognitive function Screening  Timed Get Up Test  Whisper Test      Opioid documentation:      Patient does not have a current opioid prescription.          Vitals:    01/21/25 1122 01/21/25 1133   BP: (!) 155/92 (!) 158/94   BP Location: Right arm Right arm   Patient Position: Sitting Sitting   Pulse: 81    Resp: 20    Temp: 98.5 °F (36.9 °C)    SpO2: 97%    Weight: 116.9 kg (257 lb 12.8 oz)    Height: 5' 5" (1.651 m)      Body mass index is 42.9 kg/m².       Physical Exam      Diagnoses and health risks identified today and associated recommendations/orders:  1. Encounter for subsequent annual wellness visit (AWV) in Medicare patient  Gave pt next awv appt    2. Essential hypertension  Follow-up with pcp    3. Hypothyroidism, unspecified type  Labs with pcp     4. Pre-diabetes  Low sugar diet     5. Chronic kidney disease, stage 3a  Avoid nsaids     6. Class 3 severe obesity due to excess calories with serious comorbidity and body mass index (BMI) of 40.0 to 44.9 in adult  Low sugar diet    7. Need for vaccination    - influenza (adjuvanted) (Fluad) 45 mcg/0.5 mL IM vaccine (> or = 64 yo) 0.5 mL      Provided Janay with a 5-10 year written screening schedule and personal prevention plan. Recommendations were developed using the USPSTF age appropriate recommendations. Education, counseling, and referrals were provided as needed.  After Visit Summary printed and given to patient which includes a list of additional screenings\tests needed.    Follow up for 1 year for Annual Wellness Visit.      ZEKE SESAY " RN    I offered to discuss advanced care planning, including how to pick a person who would make decisions for you if you were unable to make them for yourself, called a health care power of , and what kind of decisions you might make such as use of life sustaining treatments such as ventilators and tube feeding when faced with a life limiting illness recorded on a living will that they will need to know. (How you want to be cared for as you near the end of your natural life)     X Patient is interested in learning more about how to make advanced directives.  I provided them paperwork and offered to discuss this with them.

## 2025-01-21 NOTE — PATIENT INSTRUCTIONS
Counseling and Referral of Other Preventative  (Italic type indicates deductible and co-insurance are waived)    Patient Name: Janay Reveles  Today's Date: 1/21/2025    Health Maintenance       Date Due Completion Date    Shingles Vaccine (1 of 2) Never done ---    TETANUS VACCINE 08/13/2022 8/13/2012    Colorectal Cancer Screening 07/17/2024 7/17/2019    Override on 7/17/2019: Done ()    Influenza Vaccine (1) 09/01/2024 10/6/2023    COVID-19 Vaccine (4 - 2024-25 season) 09/01/2024 12/9/2021    RSV Vaccine (Age 60+ and Pregnant patients) (1 - 1-dose 75+ series) Never done ---    Hemoglobin A1c (Prediabetes) 10/04/2025 10/4/2024    Annual UACr 10/04/2025 10/4/2024    DEXA Scan 10/21/2027 10/21/2024    Override on 2/27/2020: Done    Lipid Panel 10/04/2029 10/4/2024    Override on 10/22/2019: Done        No orders of the defined types were placed in this encounter.    The following information is provided to all patients.  This information is to help you find resources for any of the problems found today that may be affecting your health:                  Living healthy guide: ms.gov    Understanding Diabetes: www.diabetes.org      Eating healthy: www.cdc.gov/healthyweight      CDC home safety checklist: www.cdc.gov/steadi/patient.html      Agency on Aging: ms.gov    Alcoholics anonymous (AA): www.aa.org      Physical Activity: www.reed.nih.gov/ca4hnvm      Tobacco use: ms.gov

## 2025-01-22 ENCOUNTER — PATIENT OUTREACH (OUTPATIENT)
Facility: HOSPITAL | Age: 75
End: 2025-01-22
Payer: MEDICARE

## 2025-01-22 DIAGNOSIS — Z12.11 ENCOUNTER FOR SCREENING COLONOSCOPY: Primary | ICD-10-CM

## 2025-01-22 RX ORDER — POLYETHYLENE GLYCOL 3350, SODIUM SULFATE ANHYDROUS, SODIUM BICARBONATE, SODIUM CHLORIDE, POTASSIUM CHLORIDE 236; 22.74; 6.74; 5.86; 2.97 G/4L; G/4L; G/4L; G/4L; G/4L
4 POWDER, FOR SOLUTION ORAL ONCE
Qty: 4000 ML | Refills: 0 | Status: SHIPPED | OUTPATIENT
Start: 2025-01-22 | End: 2025-01-22

## 2025-01-22 NOTE — PROGRESS NOTES
Population Health Chart Review & Patient Outreach Details    Updates Requested / Reviewed:  [x]  Care Team Updated    Health Maintenance Topics Addressed and Outreach Outcomes / Actions Taken:  Colon Cancer Screening [x] Scheduled 4/23/25. Patient notified via phone. Voiced understanding. Instruction packet mailed.               Blood Pressure Control [x] Patient checks BP at home. Will check and return call with updated reading.

## 2025-02-03 DIAGNOSIS — I10 ESSENTIAL HYPERTENSION: Chronic | ICD-10-CM

## 2025-02-03 RX ORDER — LOSARTAN POTASSIUM 100 MG/1
100 TABLET ORAL
Qty: 90 TABLET | Refills: 0 | Status: SHIPPED | OUTPATIENT
Start: 2025-02-03

## 2025-02-12 ENCOUNTER — PATIENT OUTREACH (OUTPATIENT)
Facility: HOSPITAL | Age: 75
End: 2025-02-12
Payer: MEDICARE

## 2025-02-12 NOTE — PROGRESS NOTES
Population Health Chart Review & Patient Outreach Details    Health Maintenance Topics Addressed and Outreach Outcomes / Actions Taken:  Blood Pressure Control [x] Telephone Outreach for Remote BP. No answer. Left voicemail.

## 2025-02-16 DIAGNOSIS — M10.9 GOUT, UNSPECIFIED CAUSE, UNSPECIFIED CHRONICITY, UNSPECIFIED SITE: Chronic | ICD-10-CM

## 2025-02-17 RX ORDER — ALLOPURINOL 100 MG/1
200 TABLET ORAL
Qty: 180 TABLET | Refills: 3 | Status: SHIPPED | OUTPATIENT
Start: 2025-02-17

## 2025-02-28 ENCOUNTER — EXTERNAL CHRONIC CARE MANAGEMENT (OUTPATIENT)
Dept: FAMILY MEDICINE | Facility: CLINIC | Age: 75
End: 2025-02-28
Payer: MEDICARE

## 2025-02-28 PROCEDURE — G0511 CCM/BHI BY RHC/FQHC 20MIN MO: HCPCS | Mod: ,,, | Performed by: FAMILY MEDICINE

## 2025-04-04 ENCOUNTER — OFFICE VISIT (OUTPATIENT)
Dept: FAMILY MEDICINE | Facility: CLINIC | Age: 75
End: 2025-04-04
Payer: MEDICARE

## 2025-04-04 VITALS
DIASTOLIC BLOOD PRESSURE: 84 MMHG | SYSTOLIC BLOOD PRESSURE: 142 MMHG | BODY MASS INDEX: 42.92 KG/M2 | WEIGHT: 257.63 LBS | HEIGHT: 65 IN | TEMPERATURE: 99 F | OXYGEN SATURATION: 97 % | RESPIRATION RATE: 18 BRPM | HEART RATE: 100 BPM

## 2025-04-04 DIAGNOSIS — G47.09 OTHER INSOMNIA: ICD-10-CM

## 2025-04-04 DIAGNOSIS — N18.31 CHRONIC KIDNEY DISEASE, STAGE 3A: ICD-10-CM

## 2025-04-04 DIAGNOSIS — R73.03 PRE-DIABETES: ICD-10-CM

## 2025-04-04 DIAGNOSIS — E03.9 HYPOTHYROIDISM, UNSPECIFIED TYPE: ICD-10-CM

## 2025-04-04 DIAGNOSIS — R09.81 SINUS CONGESTION: ICD-10-CM

## 2025-04-04 DIAGNOSIS — I10 ESSENTIAL HYPERTENSION: Primary | ICD-10-CM

## 2025-04-04 LAB
ALBUMIN SERPL BCP-MCNC: 3.6 G/DL (ref 3.4–4.8)
ALBUMIN/GLOB SERPL: 0.6 {RATIO}
ALP SERPL-CCNC: 104 U/L (ref 40–150)
ALT SERPL W P-5'-P-CCNC: 7 U/L
ANION GAP SERPL CALCULATED.3IONS-SCNC: 15 MMOL/L (ref 7–16)
AST SERPL W P-5'-P-CCNC: 26 U/L (ref 11–45)
BASOPHILS # BLD AUTO: 0.06 K/UL (ref 0–0.2)
BASOPHILS NFR BLD AUTO: 0.5 % (ref 0–1)
BILIRUB SERPL-MCNC: 0.4 MG/DL
BUN SERPL-MCNC: 16 MG/DL (ref 10–20)
BUN/CREAT SERPL: 15 (ref 6–20)
CALCIUM SERPL-MCNC: 9.8 MG/DL (ref 8.4–10.2)
CHLORIDE SERPL-SCNC: 108 MMOL/L (ref 98–107)
CHOLEST SERPL-MCNC: 137 MG/DL
CHOLEST/HDLC SERPL: 2.7 {RATIO}
CO2 SERPL-SCNC: 21 MMOL/L (ref 23–31)
CREAT SERPL-MCNC: 1.07 MG/DL (ref 0.55–1.02)
DIFFERENTIAL METHOD BLD: ABNORMAL
EGFR (NO RACE VARIABLE) (RUSH/TITUS): 54 ML/MIN/1.73M2
EOSINOPHIL # BLD AUTO: 0.22 K/UL (ref 0–0.5)
EOSINOPHIL NFR BLD AUTO: 2 % (ref 1–4)
ERYTHROCYTE [DISTWIDTH] IN BLOOD BY AUTOMATED COUNT: 16.4 % (ref 11.5–14.5)
EST. AVERAGE GLUCOSE BLD GHB EST-MCNC: 128 MG/DL
GLOBULIN SER-MCNC: 5.6 G/DL (ref 2–4)
GLUCOSE SERPL-MCNC: 125 MG/DL (ref 82–115)
HBA1C MFR BLD HPLC: 6.1 %
HCT VFR BLD AUTO: 38.2 % (ref 38–47)
HDLC SERPL-MCNC: 50 MG/DL (ref 35–60)
HGB BLD-MCNC: 11.7 G/DL (ref 12–16)
IMM GRANULOCYTES # BLD AUTO: 0.04 K/UL (ref 0–0.04)
IMM GRANULOCYTES NFR BLD: 0.4 % (ref 0–0.4)
LDLC SERPL CALC-MCNC: 79 MG/DL
LDLC/HDLC SERPL: 1.6 {RATIO}
LYMPHOCYTES # BLD AUTO: 1.97 K/UL (ref 1–4.8)
LYMPHOCYTES NFR BLD AUTO: 18 % (ref 27–41)
MCH RBC QN AUTO: 27.7 PG (ref 27–31)
MCHC RBC AUTO-ENTMCNC: 30.6 G/DL (ref 32–36)
MCV RBC AUTO: 90.3 FL (ref 80–96)
MONOCYTES # BLD AUTO: 0.81 K/UL (ref 0–0.8)
MONOCYTES NFR BLD AUTO: 7.4 % (ref 2–6)
MPC BLD CALC-MCNC: 9 FL (ref 9.4–12.4)
NEUTROPHILS # BLD AUTO: 7.87 K/UL (ref 1.8–7.7)
NEUTROPHILS NFR BLD AUTO: 71.7 % (ref 53–65)
NONHDLC SERPL-MCNC: 87 MG/DL
NRBC # BLD AUTO: 0 X10E3/UL
NRBC, AUTO (.00): 0 %
PLATELET # BLD AUTO: 758 K/UL (ref 150–400)
POTASSIUM SERPL-SCNC: 4.6 MMOL/L (ref 3.5–5.1)
PROT SERPL-MCNC: 9.2 G/DL (ref 5.8–7.6)
RBC # BLD AUTO: 4.23 M/UL (ref 4.2–5.4)
SODIUM SERPL-SCNC: 139 MMOL/L (ref 136–145)
TRIGL SERPL-MCNC: 41 MG/DL (ref 37–140)
TSH SERPL DL<=0.005 MIU/L-ACNC: 2.47 UIU/ML (ref 0.35–4.94)
VLDLC SERPL-MCNC: 8 MG/DL
WBC # BLD AUTO: 10.97 K/UL (ref 4.5–11)

## 2025-04-04 PROCEDURE — 83036 HEMOGLOBIN GLYCOSYLATED A1C: CPT | Mod: ,,, | Performed by: CLINICAL MEDICAL LABORATORY

## 2025-04-04 PROCEDURE — 80061 LIPID PANEL: CPT | Mod: ,,, | Performed by: CLINICAL MEDICAL LABORATORY

## 2025-04-04 PROCEDURE — 80053 COMPREHEN METABOLIC PANEL: CPT | Mod: ,,, | Performed by: CLINICAL MEDICAL LABORATORY

## 2025-04-04 PROCEDURE — 84443 ASSAY THYROID STIM HORMONE: CPT | Mod: ,,, | Performed by: CLINICAL MEDICAL LABORATORY

## 2025-04-04 PROCEDURE — 85025 COMPLETE CBC W/AUTO DIFF WBC: CPT | Mod: ,,, | Performed by: CLINICAL MEDICAL LABORATORY

## 2025-04-04 RX ORDER — ALBUTEROL SULFATE 90 UG/1
2 INHALANT RESPIRATORY (INHALATION) EVERY 6 HOURS PRN
Qty: 18 G | Refills: 0 | Status: SHIPPED | OUTPATIENT
Start: 2025-04-04

## 2025-04-04 RX ORDER — AMLODIPINE BESYLATE 10 MG/1
10 TABLET ORAL DAILY
Qty: 90 TABLET | Refills: 1 | Status: SHIPPED | OUTPATIENT
Start: 2025-04-04

## 2025-04-04 RX ORDER — ZOLPIDEM TARTRATE 5 MG/1
5 TABLET ORAL NIGHTLY PRN
Qty: 30 TABLET | Refills: 0 | Status: SHIPPED | OUTPATIENT
Start: 2025-04-04

## 2025-04-04 NOTE — PROGRESS NOTES
Subjective     Patient ID: Janay Reveles is a 74 y.o. female.    Chief Complaint: Hypertension (6 month f/u.) and Health Maintenance (Shingles Vaccine(1 of 2) scheduled for 2nd dose next month at pharmacy./TETANUS VACCINE unknown/Colorectal Cancer Screening scheduled for April 2025/COVID-19 Vaccine(4 - 2024-25 season) due on 09/01/2024/RSV Vaccine (Age 60+ and Pregnant patients)(1 - 1-dose 75+ series) Never done )    Pt presents for a hypertension follow-up.       Review of Systems   Constitutional:  Negative for activity change, appetite change, chills, fatigue and fever.   HENT:  Positive for sinus pressure/congestion. Negative for nasal congestion, ear discharge, nosebleeds, postnasal drip, rhinorrhea, sneezing, sore throat and tinnitus.    Eyes:  Negative for pain, discharge, redness and itching.   Respiratory:  Negative for cough, choking, chest tightness, shortness of breath and wheezing.    Cardiovascular:  Negative for chest pain.   Gastrointestinal:  Negative for abdominal distention, abdominal pain, blood in stool, change in bowel habit, constipation, diarrhea, nausea and vomiting.   Genitourinary:  Negative for decreased urine volume, dysuria, flank pain and frequency.   Musculoskeletal:  Negative for back pain and gait problem.   Integumentary:  Negative for wound, breast mass and breast discharge.   Allergic/Immunologic: Negative for immunocompromised state.   Neurological:  Negative for dizziness, light-headedness and headaches.   Psychiatric/Behavioral:  Negative for agitation, behavioral problems and hallucinations.    Breast: Negative for mass         Objective     Physical Exam  Vitals and nursing note reviewed.   Constitutional:       Appearance: Normal appearance.   Cardiovascular:      Rate and Rhythm: Normal rate and regular rhythm.      Heart sounds: Normal heart sounds.   Pulmonary:      Effort: Pulmonary effort is normal.      Breath sounds: Normal breath sounds.   Musculoskeletal:          General: Normal range of motion.   Neurological:      Mental Status: She is alert and oriented to person, place, and time.   Psychiatric:         Mood and Affect: Mood normal.         Behavior: Behavior normal.            Assessment and Plan     1. Essential hypertension  -     CBC Auto Differential; Future; Expected date: 04/04/2025  -     Comprehensive Metabolic Panel; Future; Expected date: 04/04/2025  -     TSH; Future; Expected date: 04/04/2025  -     Lipid Panel; Future; Expected date: 04/04/2025  -     amLODIPine (NORVASC) 10 MG tablet; Take 1 tablet (10 mg total) by mouth once daily.  Dispense: 90 tablet; Refill: 1  Uncontrolled at 142/84  Increase amlodipine from 5mg to 10mg daily  Return to the clinic for a hypertension follow-up visit in 3 weeks    2. Pre-diabetes  -     Hemoglobin A1C; Future; Expected date: 04/04/2025    3. Chronic kidney disease, stage 3a  Avoid nsaids    4. Hypothyroidism, unspecified type    5. Other insomnia  -     zolpidem (AMBIEN) 5 MG Tab; Take 1 tablet (5 mg total) by mouth nightly as needed (insomnia).  Dispense: 30 tablet; Refill: 0  Pt understands to only take 5mg of ambien and only as needed to taper off    6. Sinus congestion  -     Ambulatory referral/consult to ENT; Future; Expected date: 04/11/2025  Pt requested a referral to ENT for stuffy left sided sinuses     Other orders  -     albuterol (VENTOLIN HFA) 90 mcg/actuation inhaler; Inhale 2 puffs into the lungs every 6 (six) hours as needed for Wheezing. Rescue  Dispense: 18 g; Refill: 0        Will call pt with lab results.          Follow up in about 6 months (around 10/4/2025).

## 2025-04-06 ENCOUNTER — PATIENT MESSAGE (OUTPATIENT)
Dept: FAMILY MEDICINE | Facility: CLINIC | Age: 75
End: 2025-04-06
Payer: MEDICARE

## 2025-04-09 ENCOUNTER — TELEPHONE (OUTPATIENT)
Dept: GASTROENTEROLOGY | Facility: CLINIC | Age: 75
End: 2025-04-09
Payer: MEDICARE

## 2025-04-09 ENCOUNTER — TELEPHONE (OUTPATIENT)
Dept: GASTROENTEROLOGY | Facility: CLINIC | Age: 75
End: 2025-04-09

## 2025-04-15 ENCOUNTER — OFFICE VISIT (OUTPATIENT)
Dept: OTOLARYNGOLOGY | Facility: CLINIC | Age: 75
End: 2025-04-15
Payer: MEDICARE

## 2025-04-15 VITALS — HEIGHT: 65 IN | BODY MASS INDEX: 42.82 KG/M2 | WEIGHT: 257 LBS

## 2025-04-15 DIAGNOSIS — J32.9 CHRONIC SINUSITIS, UNSPECIFIED LOCATION: Primary | ICD-10-CM

## 2025-04-15 DIAGNOSIS — T48.5X5A RHINITIS MEDICAMENTOSA: ICD-10-CM

## 2025-04-15 DIAGNOSIS — J31.0 RHINITIS MEDICAMENTOSA: ICD-10-CM

## 2025-04-15 DIAGNOSIS — R09.81 SINUS CONGESTION: ICD-10-CM

## 2025-04-15 PROCEDURE — 99214 OFFICE O/P EST MOD 30 MIN: CPT | Mod: PBBFAC | Performed by: OTOLARYNGOLOGY

## 2025-04-15 PROCEDURE — 99204 OFFICE O/P NEW MOD 45 MIN: CPT | Mod: S$PBB,,, | Performed by: OTOLARYNGOLOGY

## 2025-04-15 PROCEDURE — 99999 PR PBB SHADOW E&M-EST. PATIENT-LVL IV: CPT | Mod: PBBFAC,,, | Performed by: OTOLARYNGOLOGY

## 2025-04-15 RX ORDER — AZELASTINE 1 MG/ML
1 SPRAY, METERED NASAL 2 TIMES DAILY
Qty: 30 ML | Refills: 1 | Status: SHIPPED | OUTPATIENT
Start: 2025-04-15 | End: 2025-11-20

## 2025-04-15 RX ORDER — CLINDAMYCIN HYDROCHLORIDE 300 MG/1
300 CAPSULE ORAL 2 TIMES DAILY
Qty: 28 CAPSULE | Refills: 0 | Status: SHIPPED | OUTPATIENT
Start: 2025-04-15

## 2025-04-15 NOTE — PROGRESS NOTES
Subjective:       Patient ID: Janay Reveles is a 74 y.o. female.    Chief Complaint: Sinusitis (Patient complains of having sinus congestion, drainage, pressure and a cough. She states the sinus congestion on the left side of her face makes her face sore. She is using nasal spray with no relief.)    Sinusitis  Associated symptoms include congestion and sinus pressure.     Review of Systems   HENT:  Positive for congestion, sinus pressure and sinus pain.    All other systems reviewed and are negative.      Objective:      Physical Exam  General: NAD  Head: Normocephalic, atraumatic, no facial asymmetry/normal strength,  Ears: Both auricules normal in appearance, w/o deformities tympanic membranes normal external auditory canals normal  Nose: External nose w/o deformities congested  turbinates no drainage or inflammation red left side from afrin   Oral Cavity: Lips, gums, floor of mouth, tongue hard palate, and buccal mucosa without mass/lesion  Oropharynx: Mucosa pink and moist, soft palate, posterior pharynx and oropharyngeal wall without mass/lesion  Neck: Supple, symmetric, trachea midline, no palpable mass/lesion, no palpable cervical lymphadenopathy  Skin: Warm and dry, no concerning lesions  Respiratory: Respirations even, unlabored    Assessment:       1. Chronic sinusitis, unspecified location    2. Sinus congestion    3. Rhinitis medicamentosa        Plan:       Stop afrin   Astelin augmentin CT sinus   F/u 2 weeks

## 2025-04-21 ENCOUNTER — TELEPHONE (OUTPATIENT)
Dept: GASTROENTEROLOGY | Facility: CLINIC | Age: 75
End: 2025-04-21
Payer: MEDICARE

## 2025-04-21 NOTE — TELEPHONE ENCOUNTER
----- Message from Carline sent at 4/21/2025 10:12 AM CDT -----  Regarding: Calling Back  Who Called: Janay RevelesPatient is returning phone callWho Left Message for Patient:IDALMIS Does the patient know what this is regarding?:info on colonoscopyPatient's Preferred Phone Number on File: [phone]

## 2025-04-23 ENCOUNTER — HOSPITAL ENCOUNTER (OUTPATIENT)
Dept: GASTROENTEROLOGY | Facility: HOSPITAL | Age: 75
Discharge: HOME | End: 2025-04-23
Attending: NURSE PRACTITIONER | Admitting: INTERNAL MEDICINE
Payer: MEDICARE

## 2025-04-23 ENCOUNTER — ANESTHESIA (OUTPATIENT)
Dept: GASTROENTEROLOGY | Facility: HOSPITAL | Age: 75
End: 2025-04-23
Payer: MEDICARE

## 2025-04-23 ENCOUNTER — ANESTHESIA EVENT (OUTPATIENT)
Dept: GASTROENTEROLOGY | Facility: HOSPITAL | Age: 75
End: 2025-04-23
Payer: MEDICARE

## 2025-04-23 VITALS
TEMPERATURE: 97 F | OXYGEN SATURATION: 97 % | HEART RATE: 82 BPM | HEIGHT: 65 IN | DIASTOLIC BLOOD PRESSURE: 86 MMHG | SYSTOLIC BLOOD PRESSURE: 137 MMHG | RESPIRATION RATE: 19 BRPM | WEIGHT: 247 LBS | BODY MASS INDEX: 41.15 KG/M2

## 2025-04-23 DIAGNOSIS — Z12.11 SCREENING FOR MALIGNANT NEOPLASM OF COLON: ICD-10-CM

## 2025-04-23 PROCEDURE — 25000003 PHARM REV CODE 250: Performed by: NURSE ANESTHETIST, CERTIFIED REGISTERED

## 2025-04-23 PROCEDURE — 88305 TISSUE EXAM BY PATHOLOGIST: CPT | Mod: 26,,, | Performed by: PATHOLOGY

## 2025-04-23 PROCEDURE — 27201423 OPTIME MED/SURG SUP & DEVICES STERILE SUPPLY

## 2025-04-23 PROCEDURE — D9220A PRA ANESTHESIA: Mod: PT,,, | Performed by: NURSE ANESTHETIST, CERTIFIED REGISTERED

## 2025-04-23 PROCEDURE — 37000009 HC ANESTHESIA EA ADD 15 MINS

## 2025-04-23 PROCEDURE — 37000008 HC ANESTHESIA 1ST 15 MINUTES

## 2025-04-23 PROCEDURE — 63600175 PHARM REV CODE 636 W HCPCS: Performed by: NURSE ANESTHETIST, CERTIFIED REGISTERED

## 2025-04-23 PROCEDURE — 27000284 HC CANNULA NASAL: Performed by: NURSE ANESTHETIST, CERTIFIED REGISTERED

## 2025-04-23 PROCEDURE — 88305 TISSUE EXAM BY PATHOLOGIST: CPT | Mod: TC,91,SUR | Performed by: INTERNAL MEDICINE

## 2025-04-23 RX ORDER — SODIUM CHLORIDE 9 MG/ML
INJECTION, SOLUTION INTRAVENOUS CONTINUOUS PRN
Status: DISCONTINUED | OUTPATIENT
Start: 2025-04-23 | End: 2025-04-23

## 2025-04-23 RX ORDER — LIDOCAINE HYDROCHLORIDE 20 MG/ML
INJECTION, SOLUTION EPIDURAL; INFILTRATION; INTRACAUDAL; PERINEURAL
Status: DISCONTINUED | OUTPATIENT
Start: 2025-04-23 | End: 2025-04-23

## 2025-04-23 RX ORDER — SODIUM CHLORIDE 0.9 % (FLUSH) 0.9 %
10 SYRINGE (ML) INJECTION EVERY 6 HOURS PRN
Status: DISCONTINUED | OUTPATIENT
Start: 2025-04-23 | End: 2025-04-24 | Stop reason: HOSPADM

## 2025-04-23 RX ORDER — SODIUM CHLORIDE, SODIUM LACTATE, POTASSIUM CHLORIDE, CALCIUM CHLORIDE 600; 310; 30; 20 MG/100ML; MG/100ML; MG/100ML; MG/100ML
INJECTION, SOLUTION INTRAVENOUS CONTINUOUS
Status: DISCONTINUED | OUTPATIENT
Start: 2025-04-23 | End: 2025-04-24 | Stop reason: HOSPADM

## 2025-04-23 RX ORDER — PROPOFOL 10 MG/ML
VIAL (ML) INTRAVENOUS
Status: DISCONTINUED | OUTPATIENT
Start: 2025-04-23 | End: 2025-04-23

## 2025-04-23 RX ADMIN — LIDOCAINE HYDROCHLORIDE 100 MG: 20 INJECTION, SOLUTION EPIDURAL; INFILTRATION; INTRACAUDAL; PERINEURAL at 10:04

## 2025-04-23 RX ADMIN — SODIUM CHLORIDE: 9 INJECTION, SOLUTION INTRAVENOUS at 10:04

## 2025-04-23 RX ADMIN — PROPOFOL 50 MG: 10 INJECTION, EMULSION INTRAVENOUS at 10:04

## 2025-04-23 NOTE — TRANSFER OF CARE
"Anesthesia Transfer of Care Note    Patient: Janay Reveles    Procedure(s) Performed: * No procedures listed *    Patient location: GI    Anesthesia Type: general    Transport from OR: Transported from OR on room air with adequate spontaneous ventilation    Post pain: adequate analgesia    Post assessment: no apparent anesthetic complications    Post vital signs: stable    Level of consciousness: responds to stimulation    Nausea/Vomiting: no nausea/vomiting    Complications: none    Transfer of care protocol was followed      Last vitals: Visit Vitals  BP (!) 93/52 (BP Location: Left arm, Patient Position: Lying)   Pulse 75   Temp 36.1 °C (97 °F) (Oral)   Resp 17   Ht 5' 5" (1.651 m)   Wt 112 kg (247 lb)   LMP  (LMP Unknown)   SpO2 98%   Breastfeeding No   BMI 41.10 kg/m²     "

## 2025-04-23 NOTE — H&P
History & Physical - Short Stay  Gastroenterology      SUBJECTIVE:     Procedure: Colonoscopy    Chief Complaint/Indication for Procedure: Screening    (Not in a hospital admission)      Review of patient's allergies indicates:  No Known Allergies     Past Medical History:   Diagnosis Date    Anemia     Arthritis     Asthma     Breast cancer screening by mammogram 09/07/2023    [facility]    Encounter for long-term (current) use of other medications     Encounter for screening colonoscopy 05/13/2015    Essential hypertension     Goiter     Gout     H/O bone density study 02/27/2020    History of esophagogastroduodenoscopy (EGD) 01/2020    Hypokalemia     Hypothyroidism     Insomnia     Pre-diabetes     Screening for malignant neoplasm of cervix 01/20/2020    Screening mammogram, encounter for 07/16/2021    Andersons/negative exam/scattered fibroglandular densities/recommended anual screening    Screening mammogram, encounter for 06/11/2020     Past Surgical History:   Procedure Laterality Date    ABLATION, CHEMICAL SEALANT, VARICOSE VEIN Right 4/12/2024    Procedure: ABLATION,CHEMICAL SEALANT,VARICOSE VEI;  Surgeon: Darron Alaniz DO;  Location: [facility];  Service: Peripheral Vascular;  Laterality: Right;    HYSTERECTOMY      STAB PHLEBECTOMY OF VARICOSE VEINS Right 9/13/2024    Procedure: Right Leg Microphlebectomies 10-20;  Surgeon: Darron Alaniz DO;  Location: [facility];  Service: Peripheral Vascular;  Laterality: Right;    TUBAL LIGATION       Family History   Problem Relation Name Age of Onset    No Known Problems Mother      No Known Problems Father       Social History[1]      OBJECTIVE:     Physical Exam:                                                       General appearance: alert, cooperative, no distress, comfortable appearing  HENT: Normocephalic, atraumatic, neck symmetrical  Eyes: conjunctivae clear  Lungs: No labored breathing  Abd: Soft, non-tender    ASSESSMENT/PLAN:     Assessment:  Screening    Plan: Colonoscopy         [1]   Social History  Tobacco Use    Smoking status: Never     Passive exposure: Never    Smokeless tobacco: Never   Substance Use Topics    Alcohol use: Never    Drug use: Never

## 2025-04-23 NOTE — DISCHARGE INSTRUCTIONS
Procedure Date  4/23/25     Impression  Overall Impression:   Tortuous colon requiring change in position and abdominal pressure to advance to cecum  2 subcentimeter polyps in the ascending colon     Recommendation  - Repeat colonoscopy not necessary due to age  - Discharge patient to home  - Advance diet as tolerated  - Continue present medications  - Await pathology results  - Patient has a contact number available for emergencies. The signs and symptoms of potential delayed complications were discussed with the patient. Return to normal activities tomorrow. Written discharge instructions were provided to the patient.    No driving today, no operating heavy machinery, no signing any legal documents until tomorrow.    Drink lots of fluids, resume regular diet.  Take your normal medications.     Please call our office for any nausea, vomiting or abdominal pain.

## 2025-04-23 NOTE — ANESTHESIA PREPROCEDURE EVALUATION
04/23/2025  Janay Reveles is a 74 y.o., female.    Social History     Socioeconomic History    Marital status: Single   Occupational History    Occupation: retired   Tobacco Use    Smoking status: Never     Passive exposure: Never    Smokeless tobacco: Never   Substance and Sexual Activity    Alcohol use: Never    Drug use: Never    Sexual activity: Not Currently     Social Drivers of Health     Financial Resource Strain: Low Risk  (1/21/2025)    Overall Financial Resource Strain (CARDIA)     Difficulty of Paying Living Expenses: Not hard at all   Food Insecurity: No Food Insecurity (1/21/2025)    Hunger Vital Sign     Worried About Running Out of Food in the Last Year: Never true     Ran Out of Food in the Last Year: Never true   Transportation Needs: No Transportation Needs (1/21/2025)    PRAPARE - Transportation     Lack of Transportation (Medical): No     Lack of Transportation (Non-Medical): No   Physical Activity: Insufficiently Active (1/21/2025)    Exercise Vital Sign     Days of Exercise per Week: 1 day     Minutes of Exercise per Session: 60 min   Stress: No Stress Concern Present (1/21/2025)    [place] [place] [employer] - Occupational Stress Questionnaire     Feeling of Stress : Only a little   Housing Stability: High Risk (1/21/2025)    Housing Stability Vital Sign     Unable to Pay for Housing in the Last Year: Yes     Homeless in the Last Year: No      Pre-op Assessment    I have reviewed the Patient Summary Reports.     I have reviewed the Nursing Notes. I have reviewed the NPO Status.   I have reviewed the Medications.     Review of Systems  Anesthesia Hx:  No problems with previous Anesthesia                Cardiovascular:     Hypertension                                          Pulmonary:    Asthma                    Renal/:  Chronic Renal Disease                 Musculoskeletal:  Arthritis               Endocrine:   Hypothyroidism            Past Medical History:   Diagnosis Date    Anemia     Arthritis     Asthma     Breast cancer screening by mammogram 09/07/2023    [facility]    Encounter for long-term (current) use of other medications     Encounter for screening colonoscopy 05/13/2015    Essential hypertension     Goiter     Gout     H/O bone density study 02/27/2020    History of esophagogastroduodenoscopy (EGD) 01/2020    Hypokalemia     Hypothyroidism     Insomnia     Pre-diabetes     Screening for malignant neoplasm of cervix 01/20/2020    Screening mammogram, encounter for 07/16/2021    Andersons/negative exam/scattered fibroglandular densities/recommended anual screening    Screening mammogram, encounter for 06/11/2020      Past Surgical History:   Procedure Laterality Date    ABLATION, CHEMICAL SEALANT, VARICOSE VEIN Right 4/12/2024    Procedure: ABLATION,CHEMICAL SEALANT,VARICOSE VEI;  Surgeon: Darron Alaniz DO;  Location: [facility] OR;  Service: Peripheral Vascular;  Laterality: Right;    HYSTERECTOMY      STAB PHLEBECTOMY OF VARICOSE VEINS Right 9/13/2024    Procedure: Right Leg Microphlebectomies 10-20;  Surgeon: Darron Alaniz DO;  Location: [facility];  Service: Peripheral Vascular;  Laterality: Right;    TUBAL LIGATION          Physical Exam  General: Well nourished, Cooperative, Alert and Oriented    Airway:  Mallampati: II     Chest/Lungs:  Clear to auscultation    Heart:  Rate: Normal        Anesthesia Plan  Type of Anesthesia, risks & benefits discussed:    Anesthesia Type: Gen Natural Airway, MAC  Intra-op Monitoring Plan: Standard ASA Monitors  Post Op Pain Control Plan: multimodal analgesia and IV/PO Opioids PRN  Induction:  IV  Informed Consent: Informed consent signed with the Patient and all parties understand the risks and agree with anesthesia plan.  All questions answered. Patient consented to blood products? Yes  ASA Score:  3  Day of Surgery Review of History & Physical: I have interviewed and examined the patient. I have reviewed the patient's H&P dated: There are no significant changes.     Ready For Surgery From Anesthesia Perspective.     .

## 2025-04-24 ENCOUNTER — APPOINTMENT (OUTPATIENT)
Dept: RADIOLOGY | Facility: CLINIC | Age: 75
End: 2025-04-24
Attending: NURSE PRACTITIONER
Payer: MEDICARE

## 2025-04-24 ENCOUNTER — OFFICE VISIT (OUTPATIENT)
Dept: FAMILY MEDICINE | Facility: CLINIC | Age: 75
End: 2025-04-24
Payer: MEDICARE

## 2025-04-24 VITALS
TEMPERATURE: 98 F | HEIGHT: 65 IN | OXYGEN SATURATION: 98 % | SYSTOLIC BLOOD PRESSURE: 134 MMHG | BODY MASS INDEX: 42.82 KG/M2 | DIASTOLIC BLOOD PRESSURE: 74 MMHG | WEIGHT: 257 LBS | HEART RATE: 73 BPM

## 2025-04-24 DIAGNOSIS — Z02.1 PRE-EMPLOYMENT EXAMINATION: ICD-10-CM

## 2025-04-24 DIAGNOSIS — I10 ESSENTIAL HYPERTENSION: Primary | ICD-10-CM

## 2025-04-24 LAB
ESTROGEN SERPL-MCNC: NORMAL PG/ML
INSULIN SERPL-ACNC: NORMAL U[IU]/ML
LAB AP GROSS DESCRIPTION: NORMAL
LAB AP LABORATORY NOTES: NORMAL
T3RU NFR SERPL: NORMAL %

## 2025-04-24 PROCEDURE — 71046 X-RAY EXAM CHEST 2 VIEWS: CPT | Mod: TC,RHCUB | Performed by: NURSE PRACTITIONER

## 2025-04-24 PROCEDURE — 71046 X-RAY EXAM CHEST 2 VIEWS: CPT | Mod: 26,,, | Performed by: RADIOLOGY

## 2025-04-24 NOTE — PROGRESS NOTES
Subjective     Patient ID: Janay Reveles is a 74 y.o. female.    Chief Complaint: 3 Week Hypertension fu and Health Maintenance (Shingles Vaccine(1 of 2) Never done- [place] 19/TETANUS VACCINE due on 08/13/2022/COVID-19 Vaccine(4 - 2024-25 season) due on 09/01/2024- pt declined/RSV Vaccine (Age 60+ and Pregnant patients)(1 - 1-dose 75+ series) Never done- pt declined)    Pt presents for a 3 week hypertension follow-up and chest xray for work.      Review of Systems   Constitutional:  Negative for activity change, appetite change, chills, fatigue and fever.   HENT:  Negative for nasal congestion, ear discharge, nosebleeds, postnasal drip, rhinorrhea, sinus pressure/congestion, sneezing, sore throat and tinnitus.    Eyes:  Negative for pain, discharge, redness and itching.   Respiratory:  Negative for cough, choking, chest tightness, shortness of breath and wheezing.    Cardiovascular:  Negative for chest pain.   Gastrointestinal:  Negative for abdominal distention, abdominal pain, blood in stool, change in bowel habit, constipation, diarrhea, nausea and vomiting.   Genitourinary:  Negative for decreased urine volume, dysuria, flank pain and frequency.   Musculoskeletal:  Negative for back pain and gait problem.   Integumentary:  Negative for wound, breast mass and breast discharge.   Allergic/Immunologic: Negative for immunocompromised state.   Neurological:  Negative for dizziness, light-headedness and headaches.   Psychiatric/Behavioral:  Negative for agitation, behavioral problems and hallucinations.    Breast: Negative for mass         Objective     Physical Exam  Vitals and nursing note reviewed.   Constitutional:       Appearance: Normal appearance.   Cardiovascular:      Rate and Rhythm: Normal rate and regular rhythm.      Heart sounds: Normal heart sounds.   Pulmonary:      Effort: Pulmonary effort is normal.      Breath sounds: Normal breath sounds.   Musculoskeletal:         General: Normal range of  motion.   Neurological:      Mental Status: She is alert and oriented to person, place, and time.   Psychiatric:         Mood and Affect: Mood normal.         Behavior: Behavior normal.            Assessment and Plan     1. Essential hypertension  Controlled at 134/74  Low sodium diet    2. Pre-employment examination  -     X-Ray Chest PA And Lateral; Future; Expected date: 04/24/2025        Will call pt with xray results.          No follow-ups on file.

## 2025-04-25 ENCOUNTER — PATIENT MESSAGE (OUTPATIENT)
Dept: FAMILY MEDICINE | Facility: CLINIC | Age: 75
End: 2025-04-25
Payer: MEDICARE

## 2025-04-28 NOTE — ANESTHESIA POSTPROCEDURE EVALUATION
Anesthesia Post Evaluation    Patient: Janay Reveles    Procedure(s) Performed: * No procedures listed *    Final Anesthesia Type: general      Patient location during evaluation: GI PACU  Patient participation: Yes- Able to Participate  Level of consciousness: responds to stimulation  Post-procedure vital signs: reviewed and stable  Pain management: adequate  Airway patency: patent  ELMA mitigation strategies: Multimodal analgesia  PONV status at discharge: No PONV  Anesthetic complications: no      Cardiovascular status: hemodynamically stable  Respiratory status: unassisted, spontaneous ventilation and room air  Hydration status: euvolemic  Follow-up not needed.  Comments: The pt was not arousable even with painful stimulation during the procedure.   Refer to nursing note for pain/briana score upon discharge from recovery.               Vitals Value Taken Time   /74 04/24/25 14:54   Temp 36.8 °C (98.3 °F) 04/24/25 14:54   Pulse 73 04/24/25 14:54   Resp 20 04/23/25 11:42   SpO2 98 % 04/24/25 14:54   Vitals shown include unfiled device data.      Event Time   Out of Recovery 11:47:00         Pain/Briana Score: No data recorded

## 2025-04-30 ENCOUNTER — RESULTS FOLLOW-UP (OUTPATIENT)
Dept: GASTROENTEROLOGY | Facility: CLINIC | Age: 75
End: 2025-04-30

## 2025-04-30 ENCOUNTER — OFFICE VISIT (OUTPATIENT)
Dept: OTOLARYNGOLOGY | Facility: CLINIC | Age: 75
End: 2025-04-30
Payer: MEDICARE

## 2025-04-30 ENCOUNTER — HOSPITAL ENCOUNTER (OUTPATIENT)
Dept: RADIOLOGY | Facility: HOSPITAL | Age: 75
Discharge: HOME | End: 2025-04-30
Attending: OTOLARYNGOLOGY
Payer: MEDICARE

## 2025-04-30 VITALS — BODY MASS INDEX: 42.82 KG/M2 | HEIGHT: 65 IN | WEIGHT: 257 LBS

## 2025-04-30 DIAGNOSIS — J32.9 CHRONIC SINUSITIS, UNSPECIFIED LOCATION: ICD-10-CM

## 2025-04-30 DIAGNOSIS — R09.81 SINUS CONGESTION: ICD-10-CM

## 2025-04-30 DIAGNOSIS — I10 ESSENTIAL HYPERTENSION: Chronic | ICD-10-CM

## 2025-04-30 DIAGNOSIS — J32.9 CHRONIC SINUSITIS, UNSPECIFIED LOCATION: Primary | ICD-10-CM

## 2025-04-30 PROCEDURE — 99999 PR PBB SHADOW E&M-EST. PATIENT-LVL V: CPT | Mod: PBBFAC,,, | Performed by: OTOLARYNGOLOGY

## 2025-04-30 PROCEDURE — 99215 OFFICE O/P EST HI 40 MIN: CPT | Mod: PBBFAC,25 | Performed by: OTOLARYNGOLOGY

## 2025-04-30 PROCEDURE — 70486 CT MAXILLOFACIAL W/O DYE: CPT | Mod: TC

## 2025-04-30 PROCEDURE — 99214 OFFICE O/P EST MOD 30 MIN: CPT | Mod: S$PBB,,, | Performed by: OTOLARYNGOLOGY

## 2025-04-30 RX ORDER — LOSARTAN POTASSIUM 100 MG/1
100 TABLET ORAL
Qty: 90 TABLET | Refills: 0 | Status: SHIPPED | OUTPATIENT
Start: 2025-04-30

## 2025-04-30 NOTE — PROGRESS NOTES
Subjective:       Patient ID: Janay Reveles is a 74 y.o. female.    Chief Complaint: Sinusitis (CT Sinus results)    Sinusitis  Associated symptoms include congestion and sinus pressure.     Review of Systems   HENT:  Positive for congestion, sinus pressure and sinus pain.    All other systems reviewed and are negative.      Objective:      Physical Exam  General: NAD  Head: Normocephalic, atraumatic, no facial asymmetry/normal strength,  Ears: Both auricules normal in appearance, w/o deformities tympanic membranes normal external auditory canals normal  Nose: External nose w/o deformities congested turbinates no drainage or inflammation  Oral Cavity: Lips, gums, floor of mouth, tongue hard palate, and buccal mucosa without mass/lesion  Oropharynx: Mucosa pink and moist, soft palate, posterior pharynx and oropharyngeal wall without mass/lesion  Neck: Supple, symmetric, trachea midline, no palpable mass/lesion, no palpable cervical lymphadenopathy  Skin: Warm and dry, no concerning lesions  Respiratory: Respirations even, unlabored    Assessment:       1. Chronic sinusitis, unspecified location    2. Sinus congestion        Plan:       CT reviewed and shown to pt   Sinus disease worse on left   Plan FESS

## 2025-04-30 NOTE — PATIENT INSTRUCTIONS
Ear, Nose & Throat--Dr. Noel Handy  [address]--5th floor  [place] [address]  [phone]             SINUS SURGERY (FESS) INSTRUCTIONS    Do not eat or drink anything after midnight except those medications you were instructed to take (example: heart or blood pressure medicine, do not take insulin or medication for blood sugar).    Bring all of your medications with you in the original bottles. (We need those for correct identification and dosage.)    Can not take blood thinners (Coumadin, Plavix, Eliquis, Xarelto, Pradaxa or Aspirin) nor GLP-1 agonists (Ozempic, Trulicity, Victoza or Byetta) within 7 days of procedure.    If possible, leave all valuables at home. All jewelry will need to be removed before surgery. Secure your purse/billfold before checking in, if no one is going to be with you during your stay.    You must make arrangements for someone to drive you home after your surgery. We recommend you bring someone to stay with you the day of surgery.     Thank you for choosing [facility] for your surgery. We are committed to keeping you informed during your stay. Please feel free to ask questions at any point.    POST-OP: Saline nasal spray for after surgery. You will not be able to blow your nose for 10 days after surgery. Can use saline nasal spray as many times a day as needed for dryness.    POST-OP appointment with Dr. Handy: May 22nd @ 10:00am    On surgery day, arrive at the Ambulatory Surgery building on ground floor.     Bring the following with you:   Photo ID   Insurance card  All current medications in the original bottles.     Your scheduled surgery date is: May 13, 2025.    You need to arrive at: 6:00 am (time might change).    It is important that you be on time, if not surgeries get delayed.

## 2025-05-02 ENCOUNTER — TELEPHONE (OUTPATIENT)
Dept: GASTROENTEROLOGY | Facility: CLINIC | Age: 75
End: 2025-05-02
Payer: MEDICARE

## 2025-05-02 NOTE — TELEPHONE ENCOUNTER
----- Message from Carline sent at 5/2/2025 10:16 AM CDT -----  Regarding: Calling Back for Guille  Who Called: Janay RevelesPatient is returning phone callWho Left Message for Patient:Lydia the patient know what this is regarding?:resultsPatient's Preferred Phone Number on File: [phone]

## 2025-05-02 NOTE — TELEPHONE ENCOUNTER
----- Message from Theo Mcnulty MD sent at 4/30/2025  4:55 PM CDT -----  Please advise patient that polyp pathology was reviewed and is benign and is the adenomatous type which is a precancerous/risk factor for cancer. Repeat colonoscopy not recommended due to age but can   be discussed in 5 years.    ----- Message -----  From: Lab, Background User  Sent: 4/24/2025   9:53 AM CDT  To: Theo Mcnulty MD

## 2025-05-06 ENCOUNTER — TELEPHONE (OUTPATIENT)
Dept: OTOLARYNGOLOGY | Facility: CLINIC | Age: 75
End: 2025-05-06
Payer: MEDICARE

## 2025-05-13 ENCOUNTER — ANESTHESIA (OUTPATIENT)
Dept: SURGERY | Facility: HOSPITAL | Age: 75
End: 2025-05-13
Payer: MEDICARE

## 2025-05-13 ENCOUNTER — HOSPITAL ENCOUNTER (OUTPATIENT)
Facility: HOSPITAL | Age: 75
Discharge: HOME | End: 2025-05-13
Attending: OTOLARYNGOLOGY | Admitting: OTOLARYNGOLOGY
Payer: MEDICARE

## 2025-05-13 ENCOUNTER — ANESTHESIA EVENT (OUTPATIENT)
Dept: SURGERY | Facility: HOSPITAL | Age: 75
End: 2025-05-13
Payer: MEDICARE

## 2025-05-13 VITALS
RESPIRATION RATE: 16 BRPM | BODY MASS INDEX: 42.82 KG/M2 | SYSTOLIC BLOOD PRESSURE: 151 MMHG | DIASTOLIC BLOOD PRESSURE: 79 MMHG | HEIGHT: 65 IN | TEMPERATURE: 98 F | HEART RATE: 83 BPM | WEIGHT: 257 LBS | OXYGEN SATURATION: 95 %

## 2025-05-13 DIAGNOSIS — J32.4 CHRONIC PANSINUSITIS: Primary | ICD-10-CM

## 2025-05-13 DIAGNOSIS — J32.9 CHRONIC SINUSITIS: ICD-10-CM

## 2025-05-13 PROCEDURE — 37000008 HC ANESTHESIA 1ST 15 MINUTES: Performed by: OTOLARYNGOLOGY

## 2025-05-13 PROCEDURE — 27000510 HC BLANKET BAIR HUGGER ANY SIZE: Performed by: ANESTHESIOLOGY

## 2025-05-13 PROCEDURE — 71000033 HC RECOVERY, INTIAL HOUR: Performed by: OTOLARYNGOLOGY

## 2025-05-13 PROCEDURE — 36000708 HC OR TIME LEV III 1ST 15 MIN: Performed by: OTOLARYNGOLOGY

## 2025-05-13 PROCEDURE — 87070 CULTURE OTHR SPECIMN AEROBIC: CPT | Performed by: OTOLARYNGOLOGY

## 2025-05-13 PROCEDURE — 37000009 HC ANESTHESIA EA ADD 15 MINS: Performed by: OTOLARYNGOLOGY

## 2025-05-13 PROCEDURE — 27000689 HC BLADE LARYNGOSCOPE ANY SIZE: Performed by: ANESTHESIOLOGY

## 2025-05-13 PROCEDURE — 27000716 HC OXISENSOR PROBE, ANY SIZE: Performed by: ANESTHESIOLOGY

## 2025-05-13 PROCEDURE — 31267 ENDOSCOPY MAXILLARY SINUS: CPT | Mod: 50,51,, | Performed by: OTOLARYNGOLOGY

## 2025-05-13 PROCEDURE — 87075 CULTR BACTERIA EXCEPT BLOOD: CPT | Performed by: OTOLARYNGOLOGY

## 2025-05-13 PROCEDURE — 31257 NSL/SINS NDSC TOT W/SPHENDT: CPT | Mod: 50,,, | Performed by: OTOLARYNGOLOGY

## 2025-05-13 PROCEDURE — 36000709 HC OR TIME LEV III EA ADD 15 MIN: Performed by: OTOLARYNGOLOGY

## 2025-05-13 PROCEDURE — 27000165 HC TUBE, ETT CUFFED: Performed by: ANESTHESIOLOGY

## 2025-05-13 PROCEDURE — 71000015 HC POSTOP RECOV 1ST HR: Performed by: OTOLARYNGOLOGY

## 2025-05-13 PROCEDURE — 27000655: Performed by: ANESTHESIOLOGY

## 2025-05-13 PROCEDURE — 25000003 PHARM REV CODE 250: Performed by: NURSE ANESTHETIST, CERTIFIED REGISTERED

## 2025-05-13 PROCEDURE — 25000003 PHARM REV CODE 250: Performed by: OTOLARYNGOLOGY

## 2025-05-13 PROCEDURE — 71000016 HC POSTOP RECOV ADDL HR: Performed by: OTOLARYNGOLOGY

## 2025-05-13 PROCEDURE — 63600175 PHARM REV CODE 636 W HCPCS: Performed by: NURSE ANESTHETIST, CERTIFIED REGISTERED

## 2025-05-13 RX ORDER — HYDROMORPHONE HYDROCHLORIDE 2 MG/ML
0.5 INJECTION, SOLUTION INTRAMUSCULAR; INTRAVENOUS; SUBCUTANEOUS EVERY 5 MIN PRN
Status: DISCONTINUED | OUTPATIENT
Start: 2025-05-13 | End: 2025-05-13 | Stop reason: HOSPADM

## 2025-05-13 RX ORDER — ROCURONIUM BROMIDE 10 MG/ML
INJECTION, SOLUTION INTRAVENOUS
Status: DISCONTINUED | OUTPATIENT
Start: 2025-05-13 | End: 2025-05-13

## 2025-05-13 RX ORDER — SODIUM CHLORIDE 9 MG/ML
INJECTION, SOLUTION INTRAVENOUS CONTINUOUS
Status: DISCONTINUED | OUTPATIENT
Start: 2025-05-13 | End: 2025-05-13 | Stop reason: HOSPADM

## 2025-05-13 RX ORDER — ONDANSETRON HYDROCHLORIDE 2 MG/ML
4 INJECTION, SOLUTION INTRAVENOUS DAILY PRN
Status: DISCONTINUED | OUTPATIENT
Start: 2025-05-13 | End: 2025-05-13 | Stop reason: HOSPADM

## 2025-05-13 RX ORDER — SODIUM CHLORIDE, SODIUM LACTATE, POTASSIUM CHLORIDE, CALCIUM CHLORIDE 600; 310; 30; 20 MG/100ML; MG/100ML; MG/100ML; MG/100ML
125 INJECTION, SOLUTION INTRAVENOUS CONTINUOUS
Status: DISCONTINUED | OUTPATIENT
Start: 2025-05-13 | End: 2025-05-13 | Stop reason: HOSPADM

## 2025-05-13 RX ORDER — HYDROCODONE BITARTRATE AND ACETAMINOPHEN 7.5; 325 MG/1; MG/1
1 TABLET ORAL EVERY 6 HOURS PRN
Refills: 0 | Status: DISCONTINUED | OUTPATIENT
Start: 2025-05-13 | End: 2025-05-13 | Stop reason: HOSPADM

## 2025-05-13 RX ORDER — OXYCODONE HYDROCHLORIDE 5 MG/1
5 TABLET ORAL
Status: DISCONTINUED | OUTPATIENT
Start: 2025-05-13 | End: 2025-05-13 | Stop reason: HOSPADM

## 2025-05-13 RX ORDER — PROPOFOL 10 MG/ML
VIAL (ML) INTRAVENOUS
Status: DISCONTINUED | OUTPATIENT
Start: 2025-05-13 | End: 2025-05-13

## 2025-05-13 RX ORDER — PHENYLEPHRINE HYDROCHLORIDE 10 MG/ML
INJECTION INTRAVENOUS
Status: DISCONTINUED | OUTPATIENT
Start: 2025-05-13 | End: 2025-05-13

## 2025-05-13 RX ORDER — FENTANYL CITRATE 50 UG/ML
INJECTION, SOLUTION INTRAMUSCULAR; INTRAVENOUS
Status: DISCONTINUED | OUTPATIENT
Start: 2025-05-13 | End: 2025-05-13

## 2025-05-13 RX ORDER — IPRATROPIUM BROMIDE AND ALBUTEROL SULFATE 2.5; .5 MG/3ML; MG/3ML
3 SOLUTION RESPIRATORY (INHALATION)
Status: DISCONTINUED | OUTPATIENT
Start: 2025-05-13 | End: 2025-05-13 | Stop reason: HOSPADM

## 2025-05-13 RX ORDER — MEPERIDINE HYDROCHLORIDE 25 MG/ML
25 INJECTION INTRAMUSCULAR; INTRAVENOUS; SUBCUTANEOUS EVERY 10 MIN PRN
Status: DISCONTINUED | OUTPATIENT
Start: 2025-05-13 | End: 2025-05-13 | Stop reason: HOSPADM

## 2025-05-13 RX ORDER — DEXAMETHASONE SODIUM PHOSPHATE 4 MG/ML
INJECTION, SOLUTION INTRA-ARTICULAR; INTRALESIONAL; INTRAMUSCULAR; INTRAVENOUS; SOFT TISSUE
Status: DISCONTINUED | OUTPATIENT
Start: 2025-05-13 | End: 2025-05-13

## 2025-05-13 RX ORDER — LIDOCAINE HYDROCHLORIDE 20 MG/ML
INJECTION, SOLUTION EPIDURAL; INFILTRATION; INTRACAUDAL; PERINEURAL
Status: DISCONTINUED | OUTPATIENT
Start: 2025-05-13 | End: 2025-05-13

## 2025-05-13 RX ORDER — DIPHENHYDRAMINE HYDROCHLORIDE 50 MG/ML
25 INJECTION, SOLUTION INTRAMUSCULAR; INTRAVENOUS EVERY 6 HOURS PRN
Status: DISCONTINUED | OUTPATIENT
Start: 2025-05-13 | End: 2025-05-13 | Stop reason: HOSPADM

## 2025-05-13 RX ORDER — ONDANSETRON HYDROCHLORIDE 2 MG/ML
INJECTION, SOLUTION INTRAVENOUS
Status: DISCONTINUED | OUTPATIENT
Start: 2025-05-13 | End: 2025-05-13

## 2025-05-13 RX ORDER — CLINDAMYCIN PHOSPHATE 900 MG/50ML
INJECTION, SOLUTION INTRAVENOUS
Status: DISCONTINUED | OUTPATIENT
Start: 2025-05-13 | End: 2025-05-13

## 2025-05-13 RX ORDER — MORPHINE SULFATE 10 MG/ML
4 INJECTION INTRAMUSCULAR; INTRAVENOUS; SUBCUTANEOUS EVERY 5 MIN PRN
Status: DISCONTINUED | OUTPATIENT
Start: 2025-05-13 | End: 2025-05-13 | Stop reason: HOSPADM

## 2025-05-13 RX ADMIN — DEXAMETHASONE SODIUM PHOSPHATE 8 MG: 4 INJECTION, SOLUTION INTRA-ARTICULAR; INTRALESIONAL; INTRAMUSCULAR; INTRAVENOUS; SOFT TISSUE at 08:05

## 2025-05-13 RX ADMIN — PHENYLEPHRINE HYDROCHLORIDE 100 MCG: 10 INJECTION INTRAVENOUS at 08:05

## 2025-05-13 RX ADMIN — ROCURONIUM BROMIDE 30 MG: 10 INJECTION, SOLUTION INTRAVENOUS at 08:05

## 2025-05-13 RX ADMIN — SODIUM CHLORIDE: 9 INJECTION, SOLUTION INTRAVENOUS at 08:05

## 2025-05-13 RX ADMIN — ONDANSETRON 4 MG: 2 INJECTION INTRAMUSCULAR; INTRAVENOUS at 08:05

## 2025-05-13 RX ADMIN — FENTANYL CITRATE 100 MCG: 50 INJECTION, SOLUTION INTRAMUSCULAR; INTRAVENOUS at 08:05

## 2025-05-13 RX ADMIN — PROPOFOL 150 MG: 10 INJECTION, EMULSION INTRAVENOUS at 08:05

## 2025-05-13 RX ADMIN — LIDOCAINE HYDROCHLORIDE 80 MG: 20 INJECTION, SOLUTION INTRAVENOUS at 08:05

## 2025-05-13 RX ADMIN — CLINDAMYCIN IN 5 PERCENT DEXTROSE 900 MG: 18 INJECTION, SOLUTION INTRAVENOUS at 08:05

## 2025-05-13 RX ADMIN — SUGAMMADEX 200 MG: 100 INJECTION, SOLUTION INTRAVENOUS at 08:05

## 2025-05-13 NOTE — TRANSFER OF CARE
"Anesthesia Transfer of Care Note    Patient: Janay Reveles    Procedure(s) Performed: Procedure(s) (LRB):  FESS (FUNCTIONAL ENDOSCOPIC SINUS SURGERY) (Bilateral)    Patient location: PACU    Anesthesia Type: general    Transport from OR: Transported from OR on 6-10 L/min O2 by face mask with adequate spontaneous ventilation    Post pain: adequate analgesia    Post assessment: no apparent anesthetic complications    Post vital signs: stable    Level of consciousness: sedated    Nausea/Vomiting: no nausea/vomiting    Complications: none    Transfer of care protocol was followed    Last vitals: Visit Vitals  BP (!) 148/90   Pulse 87   Temp 36.4 °C (97.5 °F)   Resp 15   Ht 5' 5" (1.651 m)   Wt 116.6 kg (257 lb)   LMP  (LMP Unknown)   SpO2 96%   Breastfeeding No   BMI 42.77 kg/m²     "

## 2025-05-13 NOTE — DISCHARGE INSTRUCTIONS
Dr Handy's post op instruction sheet  Norco 7.5 mg - take one tablet every 4 hours as needed for pain  Call for any problems

## 2025-05-13 NOTE — BRIEF OP NOTE
[facility] - Orthopedic Periop Services  Brief Operative Note    Surgery Date: 5/13/2025     Surgeons and Role:     * Chema Handy MD - Primary    Assisting Surgeon: None    Pre-op Diagnosis:  Chronic sinusitis, unspecified location [J32.9]    Post-op Diagnosis:  Post-Op Diagnosis Codes:     * Chronic sinusitis, unspecified location [J32.9]    Procedure(s) (LRB):  FESS (FUNCTIONAL ENDOSCOPIC SINUS SURGERY) (Bilateral)    Anesthesia: General    Operative Findings: Sinus disease    Estimated Blood Loss:25 ml         Specimens:   Specimen (24h ago, onward)      None            * No specimens in log *        Discharge Note    OUTCOME: Patient tolerated treatment/procedure well without complication and is now ready for discharge.    DISPOSITION: Home or Self Care    FINAL DIAGNOSIS: Chronic sinusitis  FOLLOWUP: In clinic      DISCHARGE INSTRUCTIONS:  No discharge procedures on file.

## 2025-05-13 NOTE — OR NURSING
0907 Admit to pacu. Lines and monitors connected. Mustache dressing in place. Vss afebril No s/s of pain,sob,nausea at this time. Report from ELADIO Saleh crna.     0915 Family notified via text    6796 Upon transfer to [facility] 17 pt is awake and alert. Mustache dressing is c,d,I. Pt denies pain,sob,nausea at this time. Report to Angelina medina. No family at bedside  155/92  98%  14  79

## 2025-05-13 NOTE — OP NOTE
Surgery Date: 5/13/2025     Surgeons and Role:     * Chema Handy MD - Primary    Assisting Surgeon: None    Pre-op Diagnosis:  Chronic sinusitis, unspecified location [J32.9]    Post-op Diagnosis:  Post-Op Diagnosis Codes:     * Chronic sinusitis, unspecified location [J32.9]    Procedure(s) (LRB):  FESS (FUNCTIONAL ENDOSCOPIC SINUS SURGERY) (Bilateral)    After general ET anesthesia the patient was decongested with neosynephrine soaked patties the  scopes were used to view the sinuses the left side was performed first using the scope for visualization the ethmoid bulla was opened thickened mucosal disease was removed from the posterior cells coming forward to clear anterior cells as needed with care to stay medial to lamina papyracea cultures were taken of purulent drainage next staying medial to middle turbinate the sphenoid sinus natural opening was enlarged Next the maxillary sinus was entered through it's natural ostium which was enlarged and thickened mucosal disease was  removed from opening and inside the sinus it was thoroughly irrigated with saline. The right side was done in a similar manner. There was no further bleeding the patient was reversed taken to RR in stable condition.    Anesthesia: General    Operative Findings: Sinus disease    Estimated Blood Loss:25 ml

## 2025-05-13 NOTE — ANESTHESIA PROCEDURE NOTES
Intubation    Date/Time: 5/13/2025 8:42 AM    Performed by: Sangeetha Saleh CRNA  Authorized by: Geronimo An MD    Intubation:     Induction:  Intravenous    Intubated:  Postinduction    Mask Ventilation:  Easy mask    Attempts:  2    Attempted By:  CRNA    Method of Intubation:  Direct    Blade:  Caitlin 4    Laryngeal View Grade: Grade III - only epiglottis visible      Attempted By (2nd Attempt):  CRNA    Method of Intubation (2nd Attempt):  Video laryngoscopy    Blade (2nd Attempt):  Glidescope 3    Laryngeal View Grade (2nd Attempt): Grade I - full view of cords      Difficult Airway Encountered?: No      Complications:  None    Airway Device:  Oral endotracheal tube    Airway Device Size:  7.0    Style/Cuff Inflation:  Cuffed    Inflation Amount (mL):  7    Tube secured:  21    Placement Verified By:  Capnometry    Complicating Factors:  None    Findings Post-Intubation:  BS equal bilateral and atraumatic/condition of teeth unchanged

## 2025-05-13 NOTE — ANESTHESIA POSTPROCEDURE EVALUATION
Anesthesia Post Evaluation    Patient: Janay Reveles    Procedure(s) Performed: Procedure(s) (LRB):  FESS (FUNCTIONAL ENDOSCOPIC SINUS SURGERY) (Bilateral)    Final Anesthesia Type: general      Patient location during evaluation: PACU  Patient participation: Yes- Able to Participate  Level of consciousness: awake and sedated  Post-procedure vital signs: reviewed and stable  Pain management: adequate  Airway patency: patent    PONV status at discharge: No PONV  Anesthetic complications: no      Cardiovascular status: blood pressure returned to baseline  Respiratory status: unassisted  Hydration status: euvolemic  Follow-up not needed.              Vitals Value Taken Time   /87 05/13/25 09:14   Temp 36.4 °C (97.5 °F) 05/13/25 09:11   Pulse 82 05/13/25 09:17   Resp 11 05/13/25 09:17   SpO2 98 % 05/13/25 09:17   Vitals shown include unfiled device data.      No case tracking events are documented in the log.      Pain/Briana Score: Briana Score: 8 (5/13/2025  9:07 AM)

## 2025-05-13 NOTE — ANESTHESIA PREPROCEDURE EVALUATION
05/13/2025  Janay Reveles is a 74 y.o., female.      Pre-op Assessment    I have reviewed the Patient Summary Reports.     I have reviewed the Nursing Notes. I have reviewed the NPO Status.   I have reviewed the Medications.     Review of Systems  Anesthesia Hx:  No problems with previous Anesthesia                Social:  Non-Smoker, No Alcohol Use       Hematology/Oncology:  Hematology Normal   Oncology Normal                                   EENT/Dental:  EENT/Dental Normal           Cardiovascular:     Hypertension                                          Pulmonary:    Asthma                    Renal/:  Chronic Renal Disease, CKD                Hepatic/GI:  Hepatic/GI Normal                    Musculoskeletal:  Arthritis               Neurological:  Neurology Normal                                      Endocrine:   Hypothyroidism        Morbid Obesity / BMI > 40  Dermatological:  Skin Normal    Psych:  Psychiatric Normal                    Physical Exam  General: Well nourished    Airway:  Mallampati: III / III  Mouth Opening: Normal  TM Distance: > 6 cm  Tongue: Normal  Neck ROM: Normal ROM    Chest/Lungs:  Clear to auscultation, Normal Respiratory Rate    Heart:  Rate: Normal  Rhythm: Regular Rhythm        Anesthesia Plan  Type of Anesthesia, risks & benefits discussed:    Anesthesia Type: Gen ETT  Intra-op Monitoring Plan: Standard ASA Monitors  Post Op Pain Control Plan: multimodal analgesia  Induction:  IV  Informed Consent: Informed consent signed with the Patient and all parties understand the risks and agree with anesthesia plan.  All questions answered.   ASA Score: 2  Day of Surgery Review of History & Physical: H&P Update referred to the surgeon/provider.I have interviewed and examined the patient. I have reviewed the patient's H&P dated:     Ready For Surgery From Anesthesia Perspective.      .

## 2025-05-15 LAB — BACTERIA SPEC RESP CULT: NORMAL

## 2025-05-22 ENCOUNTER — OFFICE VISIT (OUTPATIENT)
Dept: OTOLARYNGOLOGY | Facility: CLINIC | Age: 75
End: 2025-05-22
Payer: MEDICARE

## 2025-05-22 VITALS — BODY MASS INDEX: 42.82 KG/M2 | WEIGHT: 257 LBS | HEIGHT: 65 IN

## 2025-05-22 DIAGNOSIS — J32.9 CHRONIC SINUSITIS, UNSPECIFIED LOCATION: Primary | ICD-10-CM

## 2025-05-22 DIAGNOSIS — R09.81 SINUS CONGESTION: ICD-10-CM

## 2025-05-22 PROCEDURE — 99213 OFFICE O/P EST LOW 20 MIN: CPT | Mod: PBBFAC | Performed by: OTOLARYNGOLOGY

## 2025-05-22 PROCEDURE — 99214 OFFICE O/P EST MOD 30 MIN: CPT | Mod: S$PBB,,, | Performed by: OTOLARYNGOLOGY

## 2025-05-22 PROCEDURE — 99999 PR PBB SHADOW E&M-EST. PATIENT-LVL III: CPT | Mod: PBBFAC,,, | Performed by: OTOLARYNGOLOGY

## 2025-05-22 NOTE — PROGRESS NOTES
Subjective:       Patient ID: Janay Reveles is a 74 y.o. female.    Chief Complaint: Post-op Evaluation (Post-op FESS. Patient voices no complaints.)    HPI  Review of Systems   HENT:  Positive for congestion.    All other systems reviewed and are negative.      Objective:      Physical Exam  General: NAD  Head: Normocephalic, atraumatic, no facial asymmetry/normal strength,  Ears: Both auricules normal in appearance, w/o deformities tympanic membranes normal external auditory canals normal  Nose: External nose w/o deformities normal turbinates no drainage or inflammation  Oral Cavity: Lips, gums, floor of mouth, tongue hard palate, and buccal mucosa without mass/lesion  Oropharynx: Mucosa pink and moist, soft palate, posterior pharynx and oropharyngeal wall without mass/lesion  Neck: Supple, symmetric, trachea midline, no palpable mass/lesion, no palpable cervical lymphadenopathy  Skin: Warm and dry, no concerning lesions  Respiratory: Respirations even, unlabored    Assessment:       1. Chronic sinusitis, unspecified location    2. Sinus congestion        Plan:       Stop afrin   Start flonase   F/u 1 month

## (undated) DEVICE — MARKER REFLECTIVE SPHERES DISP

## (undated) DEVICE — APPLICATOR CHLORAPREP HI-LITE TINTED ORANGE 26ML

## (undated) DEVICE — BANDAGE MATRIX HK LOOP 6IN 5YD

## (undated) DEVICE — GLOVE SENSICARE PI SURG 7.5

## (undated) DEVICE — BNDG COFLEX FOAM LF2 ST 4X5YD

## (undated) DEVICE — GLOVE SENSICARE PI SURG 6.5

## (undated) DEVICE — Device

## (undated) DEVICE — IMMOBILIZER KNEE CUTAWAY 20IN

## (undated) DEVICE — SUTURE VICRYL 2-0 CT-1 27"

## (undated) DEVICE — IMP PIN SIGMA THRD S
Type: IMPLANTABLE DEVICE | Site: KNEE | Status: NON-FUNCTIONAL
Removed: 2022-07-18

## (undated) DEVICE — BNDG COFLEX FOAM LF2 ST 6X5YD

## (undated) DEVICE — SET MICRO INTRO SHEATH 7F 7CM

## (undated) DEVICE — MIXER SMARTMIX TOWER

## (undated) DEVICE — GOWN POLY REINF BRTH SLV XL

## (undated) DEVICE — FILM IOBAN ANTIMICROBL 60X60CM

## (undated) DEVICE — SUTURE VICRYL 1 CP UD 27IN

## (undated) DEVICE — APPLICATOR CHLORAPREP ORN 26ML

## (undated) DEVICE — KIT VENASEAL CLOSURE SYSTEM

## (undated) DEVICE — BANDAGE COMPRESSION 3IN 5YD

## (undated) DEVICE — BANDAGE MATRIX HK LOOP 4IN 5YD

## (undated) DEVICE — KIT IV START

## (undated) DEVICE — GLOVE SENSICARE PI SURG 7

## (undated) DEVICE — WRAP COBAN NL STRL 4INX5YD

## (undated) DEVICE — GLOVE SURGICAL PROTEXIS PI CLASSIC SIZE 7.5

## (undated) DEVICE — BANDAGE ELAS SOFTWRAP ST 6X5YD

## (undated) DEVICE — BLADE SAGITTAL 21MM EDGE 90X1.27MM

## (undated) DEVICE — GOWN TOGA SZ LG (DR. P)

## (undated) DEVICE — DRESSING ABD SURGIPAD 8X7.5IN

## (undated) DEVICE — SYRINGE 50CC LL STR 309653

## (undated) DEVICE — PADDING CAST SPECIALIST SYNTHETIC 4IN X 4YD STERILE

## (undated) DEVICE — GLOVE SURGICAL PROTEXIS PI CLASSIC SIZE 7.0

## (undated) DEVICE — GLOVE SURGICAL PROTEXIS PI BLUE SIZE 7.0

## (undated) DEVICE — TOURNIQUET CUFF DISP QC 34 INCH

## (undated) DEVICE — SET IRRIG CYSTO/BLADDER 78IN

## (undated) DEVICE — GLOVE SENSICARE PI GRN 7

## (undated) DEVICE — SUT ETHILON 3-0 PS2 18 BLK

## (undated) DEVICE — SOL NACL IRR 1000ML BTL

## (undated) DEVICE — BLADE SAW RECIPROCATE DOUBLE S

## (undated) DEVICE — NEEDLE SPINAL 18G X 3 1/2IN

## (undated) DEVICE — CATH IV INTROCAN 22G X 1

## (undated) DEVICE — BANDAGE GAUZE COT STRL 4.5X4.1

## (undated) DEVICE — IRRIGATOR INTERPULSE HANDPIECE SET

## (undated) DEVICE — SYR 10CC LUER LOCK

## (undated) DEVICE — SET EXT STD BORE CATH 7.6IN

## (undated) DEVICE — DRESSING XEROFORM 5X9

## (undated) DEVICE — STAPLER SKIN PROXIMATE PLUS MD 35 REG DISP

## (undated) DEVICE — SOL IRRIGATION SALINE 0.9% 1000ML BOTTLE

## (undated) DEVICE — SPONGE GAUZE 4X4 12 PLY STL AMD 10/TRAY

## (undated) DEVICE — SOL CONTINU-FLO SET 2 LAV

## (undated) DEVICE — SET EXTENSION CLEARLINK 2INJ

## (undated) DEVICE — GLOVE SURGICAL PROTEXIS PI BLUE SIZE 7.5

## (undated) DEVICE — CDS KNEE TOTAL